# Patient Record
Sex: FEMALE | Race: WHITE | NOT HISPANIC OR LATINO | Employment: OTHER | URBAN - METROPOLITAN AREA
[De-identification: names, ages, dates, MRNs, and addresses within clinical notes are randomized per-mention and may not be internally consistent; named-entity substitution may affect disease eponyms.]

---

## 2018-04-02 ENCOUNTER — OFFICE VISIT CONVERTED (OUTPATIENT)
Dept: FAMILY MEDICINE CLINIC | Facility: CLINIC | Age: 78
End: 2018-04-02
Attending: NURSE PRACTITIONER

## 2018-10-10 ENCOUNTER — OFFICE VISIT CONVERTED (OUTPATIENT)
Dept: FAMILY MEDICINE CLINIC | Facility: CLINIC | Age: 78
End: 2018-10-10
Attending: NURSE PRACTITIONER

## 2018-10-10 ENCOUNTER — CONVERSION ENCOUNTER (OUTPATIENT)
Dept: FAMILY MEDICINE CLINIC | Facility: CLINIC | Age: 78
End: 2018-10-10

## 2019-03-14 ENCOUNTER — OFFICE VISIT CONVERTED (OUTPATIENT)
Dept: FAMILY MEDICINE CLINIC | Facility: CLINIC | Age: 79
End: 2019-03-14
Attending: NURSE PRACTITIONER

## 2019-03-14 ENCOUNTER — HOSPITAL ENCOUNTER (OUTPATIENT)
Dept: FAMILY MEDICINE CLINIC | Facility: CLINIC | Age: 79
Discharge: HOME | End: 2019-03-14
Attending: NURSE PRACTITIONER

## 2019-03-14 LAB
ALBUMIN SERPL-MCNC: 4.1 G/DL (ref 3.5–5)
ALBUMIN/GLOB SERPL: 1 {RATIO} (ref 1.4–2.6)
ALP SERPL-CCNC: 129 U/L (ref 43–160)
ALT SERPL-CCNC: 11 U/L (ref 10–40)
AMPHETAMINES UR QL SCN: NEGATIVE
ANION GAP SERPL CALC-SCNC: 19 MMOL/L (ref 8–19)
APPEARANCE UR: ABNORMAL
AST SERPL-CCNC: 21 U/L (ref 15–50)
BARBITURATES UR QL SCN: NEGATIVE
BASOPHILS # BLD AUTO: 0.06 10*3/UL (ref 0–0.2)
BASOPHILS NFR BLD AUTO: 0.6 % (ref 0–3)
BENZODIAZ UR QL SCN: NEGATIVE
BILIRUB SERPL-MCNC: 0.53 MG/DL (ref 0.2–1.3)
BILIRUB UR QL: NEGATIVE
BUN SERPL-MCNC: 29 MG/DL (ref 5–25)
BUN/CREAT SERPL: 20 {RATIO} (ref 6–20)
CALCIUM SERPL-MCNC: 9.9 MG/DL (ref 8.7–10.4)
CHLORIDE SERPL-SCNC: 103 MMOL/L (ref 99–111)
CHOLEST SERPL-MCNC: 129 MG/DL (ref 107–200)
CHOLEST/HDLC SERPL: 3.7 {RATIO} (ref 3–6)
COLOR UR: ABNORMAL
CONV ABS IMM GRAN: 0.04 10*3/UL (ref 0–0.2)
CONV BACTERIA: NEGATIVE
CONV CO2: 25 MMOL/L (ref 22–32)
CONV COCAINE, UR: NEGATIVE
CONV COLLECTION SOURCE (UA): ABNORMAL
CONV CRYSTALS: ABNORMAL /[HPF]
CONV HYALINE CASTS IN URINE MICRO: ABNORMAL /[LPF]
CONV IMMATURE GRAN: 0.4 % (ref 0–1.8)
CONV TOTAL PROTEIN: 8.4 G/DL (ref 6.3–8.2)
CONV UROBILINOGEN IN URINE BY AUTOMATED TEST STRIP: 1 {EHRLICHU}/DL (ref 0.1–1)
CREAT UR-MCNC: 1.45 MG/DL (ref 0.5–0.9)
DEPRECATED RDW RBC AUTO: 46.2 FL (ref 36.4–46.3)
EOSINOPHIL # BLD AUTO: 0.27 10*3/UL (ref 0–0.7)
EOSINOPHIL # BLD AUTO: 2.6 % (ref 0–7)
ERYTHROCYTE [DISTWIDTH] IN BLOOD BY AUTOMATED COUNT: 13.1 % (ref 11.7–14.4)
EST. AVERAGE GLUCOSE BLD GHB EST-MCNC: 114 MG/DL
FOLATE SERPL-MCNC: 17.8 NG/ML (ref 4.8–20)
GFR SERPLBLD BASED ON 1.73 SQ M-ARVRAT: 35 ML/MIN/{1.73_M2}
GLOBULIN UR ELPH-MCNC: 4.3 G/DL (ref 2–3.5)
GLUCOSE SERPL-MCNC: 114 MG/DL (ref 65–99)
GLUCOSE UR QL: NEGATIVE MG/DL
HBA1C MFR BLD: 13.9 G/DL (ref 12–16)
HBA1C MFR BLD: 5.6 % (ref 3.5–5.7)
HCT VFR BLD AUTO: 43.3 % (ref 37–47)
HDLC SERPL-MCNC: 35 MG/DL (ref 40–60)
HGB UR QL STRIP: NEGATIVE
KETONES UR QL STRIP: NEGATIVE MG/DL
LDLC SERPL CALC-MCNC: 67 MG/DL (ref 70–100)
LEUKOCYTE ESTERASE UR QL STRIP: ABNORMAL
LYMPHOCYTES # BLD AUTO: 2.09 10*3/UL (ref 1–5)
MCH RBC QN AUTO: 30.9 PG (ref 27–31)
MCHC RBC AUTO-ENTMCNC: 32.1 G/DL (ref 33–37)
MCV RBC AUTO: 96.2 FL (ref 81–99)
METHADONE UR QL SCN: NEGATIVE
MONOCYTES # BLD AUTO: 0.82 10*3/UL (ref 0.2–1.2)
MONOCYTES NFR BLD AUTO: 7.9 % (ref 3–10)
NEUTROPHILS # BLD AUTO: 7.11 10*3/UL (ref 2–8)
NEUTROPHILS NFR BLD AUTO: 68.4 % (ref 30–85)
NITRITE UR QL STRIP: NEGATIVE
NRBC CBCN: 0 % (ref 0–0.7)
OPIATES TESTED UR SCN: NEGATIVE
OSMOLALITY SERPL CALC.SUM OF ELEC: 305 MOSM/KG (ref 273–304)
OXYCODONE UR QL SCN: NEGATIVE
PCP UR QL: NEGATIVE
PH UR STRIP.AUTO: 5 [PH] (ref 5–8)
PLATELET # BLD AUTO: 272 10*3/UL (ref 130–400)
PMV BLD AUTO: 13 FL (ref 9.4–12.3)
POTASSIUM SERPL-SCNC: 3.2 MMOL/L (ref 3.5–5.3)
PROT UR QL: NEGATIVE MG/DL
RBC # BLD AUTO: 4.5 10*6/UL (ref 4.2–5.4)
RBC #/AREA URNS HPF: ABNORMAL /[HPF]
SODIUM SERPL-SCNC: 144 MMOL/L (ref 135–147)
SP GR UR: 1.02 (ref 1–1.03)
SQUAMOUS SPT QL MICRO: ABNORMAL /[HPF]
T4 FREE SERPL-MCNC: 1.3 NG/DL (ref 0.9–1.8)
THC SERPLBLD CFM-MCNC: NEGATIVE NG/ML
TRIGL SERPL-MCNC: 135 MG/DL (ref 40–150)
TSH SERPL-ACNC: 3.5 M[IU]/L (ref 0.27–4.2)
VARIANT LYMPHS NFR BLD MANUAL: 20.1 % (ref 20–45)
VIT B12 SERPL-MCNC: 172 PG/ML (ref 211–911)
VLDLC SERPL-MCNC: 27 MG/DL (ref 5–37)
WBC # BLD AUTO: 10.39 10*3/UL (ref 4.8–10.8)
WBC #/AREA URNS HPF: ABNORMAL /[HPF]

## 2019-03-27 ENCOUNTER — HOSPITAL ENCOUNTER (OUTPATIENT)
Dept: FAMILY MEDICINE CLINIC | Facility: CLINIC | Age: 79
Discharge: HOME | End: 2019-03-27
Attending: NURSE PRACTITIONER

## 2019-03-27 ENCOUNTER — OFFICE VISIT CONVERTED (OUTPATIENT)
Dept: FAMILY MEDICINE CLINIC | Facility: CLINIC | Age: 79
End: 2019-03-27
Attending: NURSE PRACTITIONER

## 2019-03-27 ENCOUNTER — CONVERSION ENCOUNTER (OUTPATIENT)
Dept: FAMILY MEDICINE CLINIC | Facility: CLINIC | Age: 79
End: 2019-03-27

## 2019-03-27 LAB
25(OH)D3 SERPL-MCNC: 11.2 NG/ML (ref 30–100)
ALBUMIN SERPL-MCNC: 4 G/DL (ref 3.5–5)
ALBUMIN/GLOB SERPL: 1.2 {RATIO} (ref 1.4–2.6)
ALP SERPL-CCNC: 134 U/L (ref 43–160)
ALT SERPL-CCNC: 13 U/L (ref 10–40)
ANION GAP SERPL CALC-SCNC: 17 MMOL/L (ref 8–19)
AST SERPL-CCNC: 22 U/L (ref 15–50)
BILIRUB SERPL-MCNC: 0.28 MG/DL (ref 0.2–1.3)
BUN SERPL-MCNC: 22 MG/DL (ref 5–25)
BUN/CREAT SERPL: 15 {RATIO} (ref 6–20)
CALCIUM SERPL-MCNC: 9.6 MG/DL (ref 8.7–10.4)
CHLORIDE SERPL-SCNC: 107 MMOL/L (ref 99–111)
CONV CO2: 22 MMOL/L (ref 22–32)
CONV TOTAL PROTEIN: 7.3 G/DL (ref 6.3–8.2)
CREAT UR-MCNC: 1.44 MG/DL (ref 0.5–0.9)
GFR SERPLBLD BASED ON 1.73 SQ M-ARVRAT: 35 ML/MIN/{1.73_M2}
GLOBULIN UR ELPH-MCNC: 3.3 G/DL (ref 2–3.5)
GLUCOSE SERPL-MCNC: 85 MG/DL (ref 65–99)
OSMOLALITY SERPL CALC.SUM OF ELEC: 297 MOSM/KG (ref 273–304)
POTASSIUM SERPL-SCNC: 4.3 MMOL/L (ref 3.5–5.3)
SODIUM SERPL-SCNC: 142 MMOL/L (ref 135–147)

## 2019-03-28 LAB
ALBUMIN SERPL-MCNC: 3.1 G/DL (ref 2.9–4.4)
ALBUMIN/GLOB SERPL: 0.8 {RATIO} (ref 0.7–1.7)
ALPHA2 GLOB SERPL ELPH-MCNC: 0.9 G/DL (ref 0.4–1)
BETA GLOBULIN: 1.2 G/DL (ref 0.7–1.3)
CONV ALPHA-1-GLOBULIN: 0.3 G/DL (ref 0–0.4)
CONV PE INTERPRETATION: NORMAL
CONV PE NOTE: NORMAL
CONV TOTAL PROTEIN: 6.9 G/DL (ref 6–8.5)
GAMMA GLOB SERPL ELPH-MCNC: 1.4 G/DL (ref 0.4–1.8)
GLOBULIN UR ELPH-MCNC: 3.8 G/DL (ref 2.2–3.9)
M-SPIKE: NORMAL G/DL

## 2019-03-29 LAB — BACTERIA UR CULT: NORMAL

## 2019-04-11 ENCOUNTER — HOSPITAL ENCOUNTER (OUTPATIENT)
Dept: OTHER | Facility: HOSPITAL | Age: 79
Discharge: HOME | End: 2019-04-11
Attending: NURSE PRACTITIONER

## 2019-05-21 ENCOUNTER — HOSPITAL ENCOUNTER (OUTPATIENT)
Dept: SURGERY | Facility: HOSPITAL | Age: 79
Setting detail: HOSPITAL OUTPATIENT SURGERY
Discharge: HOME | End: 2019-05-21
Attending: OPHTHALMOLOGY

## 2019-05-21 LAB — GLUCOSE BLD-MCNC: 104 MG/DL (ref 65–99)

## 2019-06-25 ENCOUNTER — HOSPITAL ENCOUNTER (OUTPATIENT)
Dept: SURGERY | Facility: HOSPITAL | Age: 79
Setting detail: HOSPITAL OUTPATIENT SURGERY
Discharge: HOME | End: 2019-06-25
Attending: OPHTHALMOLOGY

## 2019-06-25 LAB — GLUCOSE BLD-MCNC: 99 MG/DL (ref 65–99)

## 2019-08-29 ENCOUNTER — HOSPITAL ENCOUNTER (OUTPATIENT)
Dept: FAMILY MEDICINE CLINIC | Facility: CLINIC | Age: 79
Discharge: HOME | End: 2019-08-29
Attending: NURSE PRACTITIONER

## 2019-08-29 ENCOUNTER — OFFICE VISIT CONVERTED (OUTPATIENT)
Dept: FAMILY MEDICINE CLINIC | Facility: CLINIC | Age: 79
End: 2019-08-29
Attending: NURSE PRACTITIONER

## 2019-08-29 LAB
25(OH)D3 SERPL-MCNC: 35.7 NG/ML (ref 30–100)
ALBUMIN SERPL-MCNC: 4.3 G/DL (ref 3.5–5)
ALBUMIN/GLOB SERPL: 1.1 {RATIO} (ref 1.4–2.6)
ALP SERPL-CCNC: 81 U/L (ref 43–160)
ALT SERPL-CCNC: 20 U/L (ref 10–40)
ANION GAP SERPL CALC-SCNC: 21 MMOL/L (ref 8–19)
APPEARANCE UR: CLEAR
AST SERPL-CCNC: 26 U/L (ref 15–50)
BASOPHILS # BLD AUTO: 0.03 10*3/UL (ref 0–0.2)
BASOPHILS NFR BLD AUTO: 0.3 % (ref 0–3)
BILIRUB SERPL-MCNC: 0.59 MG/DL (ref 0.2–1.3)
BILIRUB UR QL: NEGATIVE
BUN SERPL-MCNC: 26 MG/DL (ref 5–25)
BUN/CREAT SERPL: 21 {RATIO} (ref 6–20)
CALCIUM SERPL-MCNC: 10.2 MG/DL (ref 8.7–10.4)
CHLORIDE SERPL-SCNC: 101 MMOL/L (ref 99–111)
CHOLEST SERPL-MCNC: 119 MG/DL (ref 107–200)
CHOLEST/HDLC SERPL: 3.4 {RATIO} (ref 3–6)
COLOR UR: ABNORMAL
CONV ABS IMM GRAN: 0.04 10*3/UL (ref 0–0.2)
CONV BACTERIA: NEGATIVE
CONV CO2: 24 MMOL/L (ref 22–32)
CONV COLLECTION SOURCE (UA): ABNORMAL
CONV HYALINE CASTS IN URINE MICRO: ABNORMAL /[LPF]
CONV IMMATURE GRAN: 0.4 % (ref 0–1.8)
CONV TOTAL PROTEIN: 8.3 G/DL (ref 6.3–8.2)
CONV UROBILINOGEN IN URINE BY AUTOMATED TEST STRIP: 1 {EHRLICHU}/DL (ref 0.1–1)
CREAT UR-MCNC: 1.23 MG/DL (ref 0.5–0.9)
DEPRECATED RDW RBC AUTO: 44.9 FL (ref 36.4–46.3)
EOSINOPHIL # BLD AUTO: 0.33 10*3/UL (ref 0–0.7)
EOSINOPHIL # BLD AUTO: 3.3 % (ref 0–7)
ERYTHROCYTE [DISTWIDTH] IN BLOOD BY AUTOMATED COUNT: 13 % (ref 11.7–14.4)
EST. AVERAGE GLUCOSE BLD GHB EST-MCNC: 111 MG/DL
FOLATE SERPL-MCNC: >20 NG/ML (ref 4.8–20)
GFR SERPLBLD BASED ON 1.73 SQ M-ARVRAT: 42 ML/MIN/{1.73_M2}
GLOBULIN UR ELPH-MCNC: 4 G/DL (ref 2–3.5)
GLUCOSE SERPL-MCNC: 110 MG/DL (ref 65–99)
GLUCOSE UR QL: NEGATIVE MG/DL
HBA1C MFR BLD: 5.5 % (ref 3.5–5.7)
HCT VFR BLD AUTO: 42.1 % (ref 37–47)
HDLC SERPL-MCNC: 35 MG/DL (ref 40–60)
HGB BLD-MCNC: 13.7 G/DL (ref 12–16)
HGB UR QL STRIP: NEGATIVE
KETONES UR QL STRIP: NEGATIVE MG/DL
LDLC SERPL CALC-MCNC: 58 MG/DL (ref 70–100)
LEUKOCYTE ESTERASE UR QL STRIP: ABNORMAL
LYMPHOCYTES # BLD AUTO: 1.99 10*3/UL (ref 1–5)
LYMPHOCYTES NFR BLD AUTO: 20.1 % (ref 20–45)
MCH RBC QN AUTO: 30.5 PG (ref 27–31)
MCHC RBC AUTO-ENTMCNC: 32.5 G/DL (ref 33–37)
MCV RBC AUTO: 93.8 FL (ref 81–99)
MONOCYTES # BLD AUTO: 0.88 10*3/UL (ref 0.2–1.2)
MONOCYTES NFR BLD AUTO: 8.9 % (ref 3–10)
NEUTROPHILS # BLD AUTO: 6.61 10*3/UL (ref 2–8)
NEUTROPHILS NFR BLD AUTO: 67 % (ref 30–85)
NITRITE UR QL STRIP: NEGATIVE
NRBC CBCN: 0 % (ref 0–0.7)
OSMOLALITY SERPL CALC.SUM OF ELEC: 299 MOSM/KG (ref 273–304)
PH UR STRIP.AUTO: 5.5 [PH] (ref 5–8)
PLATELET # BLD AUTO: 256 10*3/UL (ref 130–400)
PMV BLD AUTO: 13.3 FL (ref 9.4–12.3)
POTASSIUM SERPL-SCNC: 3.6 MMOL/L (ref 3.5–5.3)
PROT UR QL: NEGATIVE MG/DL
RBC # BLD AUTO: 4.49 10*6/UL (ref 4.2–5.4)
RBC #/AREA URNS HPF: ABNORMAL /[HPF]
SODIUM SERPL-SCNC: 142 MMOL/L (ref 135–147)
SP GR UR: 1.02 (ref 1–1.03)
SQUAMOUS SPT QL MICRO: ABNORMAL /[HPF]
T4 FREE SERPL-MCNC: 1.2 NG/DL (ref 0.9–1.8)
TRIGL SERPL-MCNC: 131 MG/DL (ref 40–150)
TSH SERPL-ACNC: 4.11 M[IU]/L (ref 0.27–4.2)
VIT B12 SERPL-MCNC: >2000 PG/ML (ref 211–911)
VLDLC SERPL-MCNC: 26 MG/DL (ref 5–37)
WBC # BLD AUTO: 9.88 10*3/UL (ref 4.8–10.8)
WBC #/AREA URNS HPF: ABNORMAL /[HPF]

## 2020-01-14 ENCOUNTER — HOSPITAL ENCOUNTER (OUTPATIENT)
Dept: FAMILY MEDICINE CLINIC | Facility: CLINIC | Age: 80
Discharge: HOME | End: 2020-01-14
Attending: NURSE PRACTITIONER

## 2020-01-14 ENCOUNTER — OFFICE VISIT CONVERTED (OUTPATIENT)
Dept: FAMILY MEDICINE CLINIC | Facility: CLINIC | Age: 80
End: 2020-01-14
Attending: NURSE PRACTITIONER

## 2020-01-14 LAB
ALBUMIN SERPL-MCNC: 4.4 G/DL (ref 3.5–5)
ALBUMIN/GLOB SERPL: 1.2 {RATIO} (ref 1.4–2.6)
ALP SERPL-CCNC: 66 U/L (ref 43–160)
ALT SERPL-CCNC: 23 U/L (ref 10–40)
AMPHETAMINES UR QL SCN: NEGATIVE
ANION GAP SERPL CALC-SCNC: 22 MMOL/L (ref 8–19)
AST SERPL-CCNC: 30 U/L (ref 15–50)
BARBITURATES UR QL SCN: NEGATIVE
BENZODIAZ UR QL SCN: NEGATIVE
BILIRUB SERPL-MCNC: 0.53 MG/DL (ref 0.2–1.3)
BUN SERPL-MCNC: 23 MG/DL (ref 5–25)
BUN/CREAT SERPL: 19 {RATIO} (ref 6–20)
CALCIUM SERPL-MCNC: 10.3 MG/DL (ref 8.7–10.4)
CHLORIDE SERPL-SCNC: 100 MMOL/L (ref 99–111)
CHOLEST SERPL-MCNC: 155 MG/DL (ref 107–200)
CHOLEST/HDLC SERPL: 4 {RATIO} (ref 3–6)
CONV CO2: 24 MMOL/L (ref 22–32)
CONV COCAINE, UR: NEGATIVE
CONV CREATININE URINE, RANDOM: 98.8 MG/DL (ref 10–300)
CONV MICROALBUM.,U,RANDOM: <12 MG/L (ref 0–20)
CONV TOTAL PROTEIN: 8.2 G/DL (ref 6.3–8.2)
CREAT UR-MCNC: 1.18 MG/DL (ref 0.5–0.9)
EST. AVERAGE GLUCOSE BLD GHB EST-MCNC: 114 MG/DL
GFR SERPLBLD BASED ON 1.73 SQ M-ARVRAT: 44 ML/MIN/{1.73_M2}
GLOBULIN UR ELPH-MCNC: 3.8 G/DL (ref 2–3.5)
GLUCOSE SERPL-MCNC: 103 MG/DL (ref 65–99)
HBA1C MFR BLD: 5.6 % (ref 3.5–5.7)
HDLC SERPL-MCNC: 39 MG/DL (ref 40–60)
LDLC SERPL CALC-MCNC: 84 MG/DL (ref 70–100)
METHADONE UR QL SCN: NEGATIVE
MICROALBUMIN/CREAT UR: 12.1 MG/G{CRE} (ref 0–35)
OPIATES TESTED UR SCN: NEGATIVE
OSMOLALITY SERPL CALC.SUM OF ELEC: 298 MOSM/KG (ref 273–304)
OXYCODONE UR QL SCN: NEGATIVE
PCP UR QL: NEGATIVE
POTASSIUM SERPL-SCNC: 3.8 MMOL/L (ref 3.5–5.3)
SODIUM SERPL-SCNC: 142 MMOL/L (ref 135–147)
T4 FREE SERPL-MCNC: 1 NG/DL (ref 0.9–1.8)
THC SERPLBLD CFM-MCNC: NEGATIVE NG/ML
TRIGL SERPL-MCNC: 161 MG/DL (ref 40–150)
TSH SERPL-ACNC: 5.12 M[IU]/L (ref 0.27–4.2)
VLDLC SERPL-MCNC: 32 MG/DL (ref 5–37)

## 2020-06-18 ENCOUNTER — OFFICE VISIT CONVERTED (OUTPATIENT)
Dept: FAMILY MEDICINE CLINIC | Facility: CLINIC | Age: 80
End: 2020-06-18
Attending: NURSE PRACTITIONER

## 2020-06-18 ENCOUNTER — CONVERSION ENCOUNTER (OUTPATIENT)
Dept: FAMILY MEDICINE CLINIC | Facility: CLINIC | Age: 80
End: 2020-06-18

## 2020-06-18 ENCOUNTER — HOSPITAL ENCOUNTER (OUTPATIENT)
Dept: FAMILY MEDICINE CLINIC | Facility: CLINIC | Age: 80
Discharge: HOME | End: 2020-06-18
Attending: NURSE PRACTITIONER

## 2020-06-18 LAB
ALBUMIN SERPL-MCNC: 4 G/DL (ref 3.5–5)
ALBUMIN/GLOB SERPL: 1.1 {RATIO} (ref 1.4–2.6)
ALP SERPL-CCNC: 68 U/L (ref 43–160)
ALT SERPL-CCNC: 16 U/L (ref 10–40)
ANION GAP SERPL CALC-SCNC: 17 MMOL/L (ref 8–19)
APPEARANCE UR: CLEAR
AST SERPL-CCNC: 27 U/L (ref 15–50)
BASOPHILS # BLD AUTO: 0.03 10*3/UL (ref 0–0.2)
BASOPHILS NFR BLD AUTO: 0.3 % (ref 0–3)
BILIRUB SERPL-MCNC: 0.53 MG/DL (ref 0.2–1.3)
BILIRUB UR QL: NEGATIVE
BUN SERPL-MCNC: 25 MG/DL (ref 5–25)
BUN/CREAT SERPL: 17 {RATIO} (ref 6–20)
CALCIUM SERPL-MCNC: 10.1 MG/DL (ref 8.7–10.4)
CHLORIDE SERPL-SCNC: 104 MMOL/L (ref 99–111)
CHOLEST SERPL-MCNC: 151 MG/DL (ref 107–200)
CHOLEST/HDLC SERPL: 4.1 {RATIO} (ref 3–6)
COLOR UR: YELLOW
CONV ABS IMM GRAN: 0.02 10*3/UL (ref 0–0.2)
CONV BACTERIA: NEGATIVE
CONV CO2: 26 MMOL/L (ref 22–32)
CONV COLLECTION SOURCE (UA): ABNORMAL
CONV IMMATURE GRAN: 0.2 % (ref 0–1.8)
CONV TOTAL PROTEIN: 7.5 G/DL (ref 6.3–8.2)
CONV UROBILINOGEN IN URINE BY AUTOMATED TEST STRIP: 1 {EHRLICHU}/DL (ref 0.1–1)
CREAT UR-MCNC: 1.47 MG/DL (ref 0.5–0.9)
DEPRECATED RDW RBC AUTO: 46.8 FL (ref 36.4–46.3)
EOSINOPHIL # BLD AUTO: 0.24 10*3/UL (ref 0–0.7)
EOSINOPHIL # BLD AUTO: 2.8 % (ref 0–7)
ERYTHROCYTE [DISTWIDTH] IN BLOOD BY AUTOMATED COUNT: 13.7 % (ref 11.7–14.4)
EST. AVERAGE GLUCOSE BLD GHB EST-MCNC: 131 MG/DL
FOLATE SERPL-MCNC: >20 NG/ML (ref 4.8–20)
GFR SERPLBLD BASED ON 1.73 SQ M-ARVRAT: 34 ML/MIN/{1.73_M2}
GLOBULIN UR ELPH-MCNC: 3.5 G/DL (ref 2–3.5)
GLUCOSE SERPL-MCNC: 105 MG/DL (ref 65–99)
GLUCOSE UR QL: NEGATIVE MG/DL
HBA1C MFR BLD: 6.2 % (ref 3.5–5.7)
HCT VFR BLD AUTO: 43.2 % (ref 37–47)
HDLC SERPL-MCNC: 37 MG/DL (ref 40–60)
HGB BLD-MCNC: 13.8 G/DL (ref 12–16)
HGB UR QL STRIP: NEGATIVE
KETONES UR QL STRIP: NEGATIVE MG/DL
LDLC SERPL CALC-MCNC: 85 MG/DL (ref 70–100)
LEUKOCYTE ESTERASE UR QL STRIP: ABNORMAL
LYMPHOCYTES # BLD AUTO: 2.26 10*3/UL (ref 1–5)
LYMPHOCYTES NFR BLD AUTO: 26.2 % (ref 20–45)
MCH RBC QN AUTO: 29.9 PG (ref 27–31)
MCHC RBC AUTO-ENTMCNC: 31.9 G/DL (ref 33–37)
MCV RBC AUTO: 93.5 FL (ref 81–99)
MONOCYTES # BLD AUTO: 0.77 10*3/UL (ref 0.2–1.2)
MONOCYTES NFR BLD AUTO: 8.9 % (ref 3–10)
NEUTROPHILS # BLD AUTO: 5.32 10*3/UL (ref 2–8)
NEUTROPHILS NFR BLD AUTO: 61.6 % (ref 30–85)
NITRITE UR QL STRIP: NEGATIVE
NRBC CBCN: 0 % (ref 0–0.7)
OSMOLALITY SERPL CALC.SUM OF ELEC: 301 MOSM/KG (ref 273–304)
PH UR STRIP.AUTO: 7 [PH] (ref 5–8)
PLATELET # BLD AUTO: 258 10*3/UL (ref 130–400)
PMV BLD AUTO: 13.2 FL (ref 9.4–12.3)
POTASSIUM SERPL-SCNC: 3.8 MMOL/L (ref 3.5–5.3)
PROT UR QL: NEGATIVE MG/DL
RBC # BLD AUTO: 4.62 10*6/UL (ref 4.2–5.4)
RBC #/AREA URNS HPF: ABNORMAL /[HPF]
SODIUM SERPL-SCNC: 143 MMOL/L (ref 135–147)
SP GR UR: 1.02 (ref 1–1.03)
SQUAMOUS SPT QL MICRO: ABNORMAL /[HPF]
T4 FREE SERPL-MCNC: 1.2 NG/DL (ref 0.9–1.8)
TRIGL SERPL-MCNC: 146 MG/DL (ref 40–150)
TSH SERPL-ACNC: 3.43 M[IU]/L (ref 0.27–4.2)
URATE SERPL-MCNC: 9.3 MG/DL (ref 2.5–7.5)
VIT B12 SERPL-MCNC: 990 PG/ML (ref 211–911)
VLDLC SERPL-MCNC: 29 MG/DL (ref 5–37)
WBC # BLD AUTO: 8.64 10*3/UL (ref 4.8–10.8)
WBC #/AREA URNS HPF: ABNORMAL /[HPF]

## 2020-09-08 ENCOUNTER — CONVERSION ENCOUNTER (OUTPATIENT)
Dept: FAMILY MEDICINE CLINIC | Facility: CLINIC | Age: 80
End: 2020-09-08

## 2020-09-08 ENCOUNTER — OFFICE VISIT CONVERTED (OUTPATIENT)
Dept: FAMILY MEDICINE CLINIC | Facility: CLINIC | Age: 80
End: 2020-09-08
Attending: NURSE PRACTITIONER

## 2020-09-11 ENCOUNTER — HOSPITAL ENCOUNTER (OUTPATIENT)
Dept: OTHER | Facility: HOSPITAL | Age: 80
Discharge: HOME | End: 2020-09-11
Attending: NURSE PRACTITIONER

## 2021-02-15 ENCOUNTER — TELEMEDICINE CONVERTED (OUTPATIENT)
Dept: FAMILY MEDICINE CLINIC | Facility: CLINIC | Age: 81
End: 2021-02-15
Attending: NURSE PRACTITIONER

## 2021-02-22 ENCOUNTER — HOSPITAL ENCOUNTER (OUTPATIENT)
Dept: FAMILY MEDICINE CLINIC | Facility: CLINIC | Age: 81
Discharge: HOME | End: 2021-02-22
Attending: NURSE PRACTITIONER

## 2021-02-22 LAB
25(OH)D3 SERPL-MCNC: 44.9 NG/ML (ref 30–100)
ALBUMIN SERPL-MCNC: 3.9 G/DL (ref 3.5–5)
ALBUMIN/GLOB SERPL: 1.1 {RATIO} (ref 1.4–2.6)
ALP SERPL-CCNC: 93 U/L (ref 43–160)
ALT SERPL-CCNC: 23 U/L (ref 10–40)
ANION GAP SERPL CALC-SCNC: 16 MMOL/L (ref 8–19)
AST SERPL-CCNC: 30 U/L (ref 15–50)
BASOPHILS # BLD AUTO: 0.05 10*3/UL (ref 0–0.2)
BASOPHILS NFR BLD AUTO: 0.6 % (ref 0–3)
BILIRUB SERPL-MCNC: 0.57 MG/DL (ref 0.2–1.3)
BUN SERPL-MCNC: 28 MG/DL (ref 5–25)
BUN/CREAT SERPL: 19 {RATIO} (ref 6–20)
CALCIUM SERPL-MCNC: 9.9 MG/DL (ref 8.7–10.4)
CHLORIDE SERPL-SCNC: 108 MMOL/L (ref 99–111)
CHOLEST SERPL-MCNC: 131 MG/DL (ref 107–200)
CHOLEST/HDLC SERPL: 3.3 {RATIO} (ref 3–6)
CONV ABS IMM GRAN: 0.02 10*3/UL (ref 0–0.2)
CONV CO2: 24 MMOL/L (ref 22–32)
CONV CREATININE URINE, RANDOM: 150.2 MG/DL (ref 10–300)
CONV IMMATURE GRAN: 0.3 % (ref 0–1.8)
CONV MICROALBUM.,U,RANDOM: <12 MG/L (ref 0–20)
CONV TOTAL PROTEIN: 7.3 G/DL (ref 6.3–8.2)
CREAT UR-MCNC: 1.44 MG/DL (ref 0.5–0.9)
DEPRECATED RDW RBC AUTO: 50.2 FL (ref 36.4–46.3)
EOSINOPHIL # BLD AUTO: 0.33 10*3/UL (ref 0–0.7)
EOSINOPHIL # BLD AUTO: 4.2 % (ref 0–7)
ERYTHROCYTE [DISTWIDTH] IN BLOOD BY AUTOMATED COUNT: 14.2 % (ref 11.7–14.4)
EST. AVERAGE GLUCOSE BLD GHB EST-MCNC: 123 MG/DL
GFR SERPLBLD BASED ON 1.73 SQ M-ARVRAT: 35 ML/MIN/{1.73_M2}
GLOBULIN UR ELPH-MCNC: 3.4 G/DL (ref 2–3.5)
GLUCOSE SERPL-MCNC: 118 MG/DL (ref 65–99)
HBA1C MFR BLD: 5.9 % (ref 3.5–5.7)
HCT VFR BLD AUTO: 42.1 % (ref 37–47)
HDLC SERPL-MCNC: 40 MG/DL (ref 40–60)
HGB BLD-MCNC: 13.3 G/DL (ref 12–16)
LDLC SERPL CALC-MCNC: 62 MG/DL (ref 70–100)
LYMPHOCYTES # BLD AUTO: 2.06 10*3/UL (ref 1–5)
LYMPHOCYTES NFR BLD AUTO: 26.1 % (ref 20–45)
MCH RBC QN AUTO: 30.9 PG (ref 27–31)
MCHC RBC AUTO-ENTMCNC: 31.6 G/DL (ref 33–37)
MCV RBC AUTO: 97.9 FL (ref 81–99)
MICROALBUMIN/CREAT UR: 8 MG/G{CRE} (ref 0–35)
MONOCYTES # BLD AUTO: 0.73 10*3/UL (ref 0.2–1.2)
MONOCYTES NFR BLD AUTO: 9.3 % (ref 3–10)
NEUTROPHILS # BLD AUTO: 4.69 10*3/UL (ref 2–8)
NEUTROPHILS NFR BLD AUTO: 59.5 % (ref 30–85)
NRBC CBCN: 0 % (ref 0–0.7)
OSMOLALITY SERPL CALC.SUM OF ELEC: 305 MOSM/KG (ref 273–304)
PLATELET # BLD AUTO: 246 10*3/UL (ref 130–400)
PMV BLD AUTO: 13.1 FL (ref 9.4–12.3)
POTASSIUM SERPL-SCNC: 3.9 MMOL/L (ref 3.5–5.3)
RBC # BLD AUTO: 4.3 10*6/UL (ref 4.2–5.4)
SODIUM SERPL-SCNC: 144 MMOL/L (ref 135–147)
TRIGL SERPL-MCNC: 144 MG/DL (ref 40–150)
TSH SERPL-ACNC: 5.25 M[IU]/L (ref 0.27–4.2)
VLDLC SERPL-MCNC: 29 MG/DL (ref 5–37)
WBC # BLD AUTO: 7.88 10*3/UL (ref 4.8–10.8)

## 2021-05-07 ENCOUNTER — HOSPITAL ENCOUNTER (OUTPATIENT)
Dept: FAMILY MEDICINE CLINIC | Facility: CLINIC | Age: 81
Discharge: HOME | End: 2021-05-07
Attending: NURSE PRACTITIONER

## 2021-05-07 LAB — TSH SERPL-ACNC: 2.52 M[IU]/L (ref 0.27–4.2)

## 2021-05-07 NOTE — PROGRESS NOTES
Progress Note      Patient Name: Nicole Holliday   Patient ID: [id number]   Sex: Female   YOB: 1940        Visit Date: January 14, 2020    Provider: JHON Booth   Location: [facility]   Location Address: [address] [place] [address] [address] [place]  [address]   Location Phone: [phone]          Chief Complaint     PATIENT IS HERE FOR MEDICATION REFILLS.      BONE DENSITY ABOUT A YEAR A GO AT [facility].  EYE EXAM AT [facility] [place] ABOUT 4 MONTHS A GO.  NO FALLS IN THE LAST 6 MONTHS.  SHE HAS HAD THE FLU  SHOT HERE IN OCT.  SHE HAS HAD THE PNEUMONIA SHOT HERE ALSO       History Of Present Illness  Nicole Holliday is a 79 year old /White female who presents for evaluation and treatment of:      pt here for refills and labs--    pt sees DR Luis tomorrow in his [facility] office--FAX LABS IF POSSIBLE     pt reports needs everything to go to the mail order--expect tramadol--at [facility]    running out of the arthritis gel -    DM--stable--diet controlled only-stays 88-92 glucoses   HTN-stable  cholesterol-stable  GERD-stable    neuropathy stable on the lyrica--and pt ran out and daughter is a APRN and sent in a 1 month for her-feet were shooting pains like sharp knife like pains off the lyirca     OA--stable--on the voltaren gel and tramadol--    osteoporosis--stable no falls no fractures --tolerated meds well       Past Medical History  Disease Name Date Onset Notes   GERD (gastroesophageal reflux disease) --  --    Hyperlipidemia --  --    Hypertension, Benign Essential --  --    Hyperthyroidism --  --          Past Surgical History  Procedure Name Date Notes   Cholecstectomy --  --    Hysterectomy (due to cancer) --  --          Medication List  Name Date Started Instructions   Aspir-81 81 mg oral tablet,delayed release (/EC)  take 1 tablet (81 mg) by oral route once daily   Boniva 150 mg oral tablet 04/22/2019 take 1 tablet by PO once a month (same date) with a full  "glass of water and remain in upright position at least 1 hour.   clopidogrel 75 mg oral tablet 08/29/2019 take 1 tablet (75 mg) by oral route once daily for 90 days   cyanocobalamin (vitamin B-12) 1,000 mcg/mL injection solution 08/29/2019 inject 1 milliliter IM once monthly for 90 days   FreeStyle Lite Strips miscellaneous strip 03/14/2019 use as directed for 90 days check the glucose daily   FreeStyle System Kit miscellaneous kit 10/10/2018 check blood glucose once daily   Lancets,Ultra Thin miscellaneous misc 03/14/2019 use as directed for 90 days check glucse daily   levothyroxine 50 mcg oral tablet 08/29/2019 take 1 tablet (50 mcg) by oral route once daily for 90 days   Lyrica 75 mg oral capsule 08/29/2019 take 1 capsule by oral route once a day (at bedtime) for 90 days   metoprolol succinate 50 mg oral tablet extended release 24 hr  take 1 tablet by oral route 2 times a day   nitroglycerin 0.4 mg sublingual tablet, sublingual  place 1 tablet (0.4 mg) by buccal route at the first sign of an attack; no more than 3 tabs are recommended within a 15 minute period.   pantoprazole 40 mg oral tablet,delayed release (DR/EC) 08/29/2019 take 1 tablet (40 mg) by oral route once daily for 90 days   pravastatin 20 mg oral tablet 08/29/2019 take 1 tablet (20 mg) by oral route once daily at bedtime for 90 days   Syringe 3cc/22Gx3/4\" 3 mL 22 gauge x 3/4\" miscellaneous syringe 08/29/2019 use as directed once monthly IM injection for 90 days   tramadol 50 mg oral tablet 08/29/2019 take 1 tablet by oral route 2 times a day for 30 days   triamcinolone acetonide 0.1 % topical cream 08/29/2019 apply to affected area(s) by topical route 3 times a day as needed for 10 days itching   valsartan-hydrochlorothiazide 160-25 mg oral tablet 08/29/2019 take 1 tablet by oral route once daily for 90 days   Vitamin D2 50,000 unit oral capsule 03/28/2019 take 1 capsule (50,000 unit) by oral route once weekly for 90 days   Voltaren 1 % topical gel " 08/29/2019 apply 2 gram to the affected area(s) by topical route 4 times per day for 90 days         Allergy List  Allergen Name Date Reaction Notes   Contrast media allergy --  --  --    PENICILLINS --  --  --          Family Medical History  Disease Name Relative/Age Notes   Family History of Colon Cancer Brother/  Father/   Father; Brother   Arthrtis Father/   Father         Social History  Finding Status Start/Stop Quantity Notes   Alcohol Current - status unknown --/-- --  15 or more drinks per week   Second hand smoke exposure Unknown --/-- --  no   Tobacco Former --/68 0.5 PPD former smoker, smokes less than 1 pack per day, for less than 5 years, never uses other tobacco products   Uses seatbelts --  --/-- --  yes         Immunizations  NameDate Admin Mfg Trade Name Lot Number Route Inj VIS Given VIS Publication   HepA10/10/2018 [employer] HAVRIX-ADULT [id number] IM LD 10/10/2018 07/20/2016   Comments: NDC 23218-729-30   Gsapatyat95/30/2019 [facility] FLUZONE-HIGH DOSE [id number] IM LA 09/30/2019    Comments: NDC 35347-244-67   Cxfaznerf18/10/2018 [facility] FLUZONE-HIGH DOSE [id number] IM LD 10/10/2018 08/07/2015   Comments: NDC 73514-438-14         Review of Systems  · Constitutional  o Denies  o : fatigue, fever  · HENT  o Denies  o : vertigo, recent head injury, nasal congestion, nasal discharge  · Cardiovascular  o Denies  o : chest pain, irregular heart beats  · Respiratory  o Denies  o : shortness of breath, productive cough  · Gastrointestinal  o Admits  o : reflux  o Denies  o : nausea, vomiting, abdominal pain  · Genitourinary  o Denies  o : dysuria  · Integument  o Denies  o : rash, hair growth change, new skin lesions  · Neurologic  o Admits  o : tingling or numbness  o Denies  o : altered mental status, seizures, tremors  · Musculoskeletal  o Admits  o : back pain, shoulder pain, hip pain, knee pain, additional musculoskeletal symptoms except as noted in the HPI  o Denies  o : joint swelling, limitation of  "motion  · Endocrine  o Denies  o : cold intolerance, heat intolerance  · Psychiatric  o Denies  o : suicidal ideation, homicidal ideation  · Heme-Lymph  o Denies  o : petechiae, lymph node enlargement or tenderness  · Allergic-Immunologic  o Denies  o : frequent illnesses      Vitals  Date Time BP Position Site L\R Cuff Size HR RR TEMP (F) WT  HT  BMI kg/m2 BSA m2 O2 Sat        01/14/2020 09:42 /72 Sitting    78 - R  98 169lbs 9oz 5'  1.5\" 31.52 1.83 98 %          Physical Examination  · Constitutional  o Appearance  o : well developed, well-nourished, in no acute distress  · Head and Face  o HEENT  o : Unremarkable  · Eyes  o Conjunctivae  o : conjunctivae normal  · Neck  o Inspection/Palpation  o : supple  o Thyroid  o : no thyromegaly  · Respiratory  o Respiratory Effort  o : breathing unlabored  o Auscultation of Lungs  o : clear to ascultation  · Cardiovascular  o Heart  o :   § Auscultation of Heart  § : regular rate and rhythm  o Peripheral Vascular System  o :   § Extremities  § : no edema  · Gastrointestinal  o Abdominal Examination  o :   § Abdomen  § : soft nontender  · Lymphatic  o Neck  o : no lymphadenopathy present  · Musculoskeletal  o General  o :   § General Musculoskeletal  § : pt moves stiffly and has OA of the hips and knees and shoulders and hands and lower back--moves stiffly and uses the cane to walk  · Skin and Subcutaneous Tissue  o General Inspection  o : skin turgor normal, texture normal  · Neurologic  o Gait and Station  o :   § Gait Screening  § : normal gait--for pt and hobbles somewhat with walking and then the pt uses the cane to walk   · Psychiatric  o Mood and Affect  o : mood normal, affect appropriate  · Left DM Foot Exam  o Sensation  o : abnormal sensory exam perceptible to 10-gram nylon monofilament exam and light touch. to the entire bottom of the foot except to the base of great toe and laterall aspect of bottom of foot   o Visual Inspection  o : visual inspection " is normal with no signs of breakdown, ulcerations or deformities unless otherwise noted. callus ot the base of great toe  o Vascular  o : palpable dorsalis pedis and posterir tibialis pulses present, normal capillary refill  · Right DM Foot Exam  o Sensation  o : abnormal sensory exam perceptible to 10-gram nylon monofilament exam touch. to the whole bottom of the foot-except the base of great toe and the toe itself   o Visual Inspection  o : visual inspection is normal with no signs of breakdown, ulcerations or deformities unless otherwise noted. (+) bunion and callus formation to the base and side of great toe  o Vascular  o : palpable dorsalis pedis and posterir tibialis pulses present, normal capillary refill              Assessment  · Diabetes mellitus, type 2     250.00/E11.9  · Essential hypertension     401.9/I10  · GERD (gastroesophageal reflux disease)     530.81/K21.9  · Hyperlipidemia     272.4/E78.5  · Hypothyroidism     244.9/E03.9  · Osteoarthritis     715.90/M19.90  · Osteoporosis     733.00/M81.0  · Polyarthralgia     719.49/M25.50  · Diet-controlled diabetes mellitus     250.00/E11.9  · Neuropathy     355.9/G62.9  · DJD (degenerative joint disease)     715.90/M19.90  · CKD (chronic kidney disease) stage 3, GFR 30-59 ml/min     585.3/N18.3  · High risk medication use     V58.69/Z79.899      Plan  · Orders  o Diabetes 2 Panel (CMP, Lipid, A1c, Urine Microalbumin) [facility] (76741, 23508, 54376, 46024) - 250.00/E11.9 - 01/14/2020  o Diabetic Foot (Motor and Sensory) Exam Completed [facility] (, , 2028F) - 250.00/E11.9 - 01/14/2020  o Thyroid Profile (THYII) - 244.9/E03.9 - 01/14/2020  o DEXA Bone Density, 1 or more sites, axial skeleton [facility] (40387) - 733.00/M81.0 - 01/14/2020   done last year see imaging  o Urine Drug Screen ([facility]) (59610) - 355.9/G62.9, 715.90/M19.90, 719.49/M25.50, V58.69/Z79.899 - 01/14/2020  o ACO-13: Fall Risk Screening with no falls in past year or only one fall without injury in  the past year (1101F) - - 01/14/2020  o ACO-14: Influenza immunization administered or previously received () - - 01/14/2020  o ACO-15: Pneumococcal Vaccine Administered or Previously Received (4040F) - - 01/14/2020  o ACO-27: Most recent 2019 HgbA1c less than 7 [facility] (3044F) - - 01/14/2020  o ACO-39: Current medications updated and reviewed () - - 01/14/2020  · Medications  o nitroglycerin 0.4 mg sublingual tablet, sublingual   SIG: place 1 tab by buccal route at the first sign of an attack no more than 3 tabs are recommended within a 15 minute period   DISP: (1) Bottle with 1 refills  Prescribed on 01/14/2020     o Boniva 150 mg oral tablet   SIG: take 1 tablet by PO once a month (same date) with a full glass of water and remain in upright position at least 1 hour.   DISP: (3) tablets with 3 refills  Adjusted on 01/14/2020     o clopidogrel 75 mg oral tablet   SIG: take 1 tablet (75 mg) by oral route once daily for 90 days   DISP: (90) tablet with 1 refills  Adjusted on 01/14/2020     o cyanocobalamin (vitamin B-12) 1,000 mcg/mL injection solution   SIG: inject 1 milliliter IM once monthly for 90 days   DISP: (1) 30 ml vial with 0 refills  Adjusted on 01/14/2020     o FreeStyle Lite Strips miscellaneous strip   SIG: use as directed for 90 days check the glucose daily   DISP: (1) 100 ct box with 3 refills  Adjusted on 01/14/2020     o levothyroxine 50 mcg oral tablet   SIG: take 1 tablet (50 mcg) by oral route once daily for 90 days   DISP: (90) tablet with 1 refills  Adjusted on 01/14/2020     o Lyrica 75 mg oral capsule   SIG: take 1 capsule by oral route once a day (at bedtime) for 90 days   DISP: (90) capsule with 0 refills  Adjusted on 01/14/2020     o pantoprazole 40 mg oral tablet,delayed release (DR/EC)   SIG: take 1 tablet (40 mg) by oral route once daily for 90 days   DISP: (90) tablet with 1 refills  Adjusted on 01/14/2020     o pravastatin 20 mg oral tablet   SIG: take 1 tablet (20 mg) by oral  "route once daily at bedtime for 90 days   DISP: (90) tablets with 1 refills  Adjusted on 01/14/2020     o Syringe 3cc/22Gx3/4\" 3 mL 22 gauge x 3/4\" miscellaneous syringe   SIG: use as directed once monthly IM injection for 90 days   DISP: (1) 100 ct box with 0 refills  Adjusted on 01/14/2020     o tramadol 50 mg oral tablet   SIG: take 1 tablet by oral route 2 times a day for 30 days   DISP: (60) tablet with 2 refills  Adjusted on 01/14/2020     o valsartan-hydrochlorothiazide 160-25 mg oral tablet   SIG: take 1 tablet by oral route once daily for 90 days   DISP: (90) tablets with 1 refills  Adjusted on 01/14/2020     o Voltaren 1 % topical gel   SIG: apply to affected area(s) by topical route 4 times a day for 90 days   DISP: (3) 100 gm tube with 1 refills  Adjusted on 01/14/2020     o Medications have been Reconciled  o Transition of Care or Provider Policy  · Instructions  o Continue blood sugar monitoring daily and record. Bring your log to office visits. Call the office for readings below 70 and above 250 or any complications.  o Daily foot care. Avoid walking barefoot. Annual Dilated Eye Exam.  o Discussed with patient blood pressure monitoring, hemoglobin A1C levels need to be below 7.0, and LDL (Lipid) goals below 70.  o Patient advised to monitor blood pressure (B/P) at home and journal readings. Patient informed that a B/P reading at home of more than 135/85 is considered hypertension. For readings greater pnif554/90 or higher patient is advised to follow up in the office with readings for management. Patient advised to limit sodium intake.  o Maintain a healthy weight. Avoid tight fitting clothes. Avoid fried, fatty foods, tomato sauce, chocolate, mint, garlic, onion, alcohol. caffeine. Eat smaller meals, dont lie down after a meal, dont smoke. Elevate the head of your bed 6-9 inches.  o Recommended exercise program to assist with cholesterol, weight loss and overall health improvement.  o Advised that " cheeses and other sources of dairy fats, animal fats, fast food, and the extras (candy, pasteries, pies, doughnuts and cookies) all contain LDL raising nutrients. Advised to increase fruits, vegetables, whole grains, and to monitor portion sizes.   o Tylenol may be used as needed every 4-6 hours for pain.  o Stop taking calcium supplements for at least 48 hours prior to your exam. Failure to stop supplements could alter results, and the radiologists will require you to reschedule your test.  o Obtained a written consent for ANASTASIYA query. Discussed the risk and benefits of the use of controlled substances with the patient, including the risk of tolerance and drug dependence. The patient has been counseled on the need to have an exit strategy, including potentially discontinuing the use of controlled substances. ANASTASIYA has or will be reviewed as soon as it becomes avaliable.  o Take all medications as prescribed/directed.  o Patient was educated/instructed on their diagnosis, treatment and medications prior to discharge from the clinic today.  o Patient instructed to seek medical attention urgently for new or worsening symptoms.  o Call the office with any concerns or questions.  o Patient given paper scripts today.  o Risks, benefits, and alternatives were discussed with the patient. The patient is aware of risks associated with: CS use  o Chronic conditions reviewed and taken into consideration for today's treatment plan.  · Disposition  o Call or Return if symptoms worsen or persist.  o Return Visit Request in/on 3 months +/- 2 days (8264).            Electronically Signed by: JHON Booth -Author on January 14, 2020 10:55:18 AM

## 2021-05-07 NOTE — PROGRESS NOTES
Progress Note      Patient Name: Nicole Holliday   Patient ID: [id number]   Sex: Female   YOB: 1940        Visit Date: April 2, 2018    Provider: JHON Multani   Location: [facility]   Location Address: [address] [place] [address]  [id number]   Location Phone: [phone]          Chief Complaint     refills and labs, patient is fasting       History Of Present Illness  Nicole Holliday is a 77 year old /White female who presents for evaluation and treatment of:      Chronic health conditions and to refill medications.    She states that she has been doing well. Feels good. Has no complaints.    She reports compliance with her cholesterol medication. No muscle aches or weakness    She does not check her blood pressure at home. She had an episode a while back that was kind of scary. She has a pacemaker/defibrillator implant. She had a period of 3 hours for her heart rate was more than 300 and her blood pressure dropped. Her daughter encouraged her to go to the emergency room. She did not go. After 3 hours it stabilized. She did call the cardiologist. She sees Dr. Luis. She has a follow-up with him on April 26. Has not repeated.    She thinks that her thyroid condition is stable. However she wants to check it that she knows that it can messed up the heart rate.    She takes Lyrica for neuropathy. She was diabetic at some point in time in her life. The neuropathy started at that time. Now she still has some numbness and tingling that her diabetes is very well controlled. She has not taken medications for had an elevated blood glucose in many years.    She reports that her reflux is stable with the use of medication.    She has arthritis in her hip and legs. She saw the orthopedist. She is not a candidate for replacementher arthritis is too severe he told her it would just create other problems to replace her hips. She does well with tramadol. She uses  twice a day. It allows her to stay active. She walks with a cane.      She lost her  in October. Very peaceful passing. She did get a small dog to keep her company.                          Past Surgical History  Procedure Name Date Notes   Cholecstectomy --  --    Hysterectomy (due to cancer) --  --          Medication List  Name Date Started Instructions   Aspir-81 81 mg oral tablet,delayed release (DR/EC)  take 1 tablet (81 mg) by oral route once daily   atorvastatin 20 mg oral tablet  take 1 tablet (20 mg) by oral route once daily   clopidogrel 75 mg oral tablet  take 1 tablet (75 mg) by oral route once daily   levothyroxine 50 mcg oral tablet  take 1 tablet (50 mcg) by oral route once daily   Lyrica 75 mg oral capsule  take 1 capsule by oral route once a day (at bedtime)   metoprolol succinate 25 mg oral tablet extended release 24 hr  take 1 tablet by oral route   metoprolol succinate 50 mg oral tablet extended release 24 hr  take 1 tablet by oral route   nitroglycerin 0.4 mg sublingual tablet, sublingual  place 1 tablet (0.4 mg) by buccal route at the first sign of an attack; no more than 3 tabs are recommended within a 15 minute period.   pantoprazole 40 mg oral tablet,delayed release (DR/EC)  take 1 tablet (40 mg) by oral route once daily   tramadol 50 mg oral tablet  take 1 tablet by oral route   valsartan-hydrochlorothiazide 160-25 mg oral tablet  take 1 tablet by oral route once daily         Allergy List  Allergen Name Date Reaction Notes   Contrast media allergy --  --  --    PENICILLINS --  --  --          Family Medical History  Disease Name Relative/Age Notes   Arthrtis / Father    Father/    Family History of Colon Cancer / Father; Brother    Brother/     Father/          Social History  Finding Status Start/Stop Quantity Notes   Alcohol Current - status unknown --/-- --  15 or more drinks per week   Second hand smoke exposure Unknown --/-- --  no   Tobacco Former --/68 0.5 PPD former smoker,  smokes less than 1 pack per day, for less than 5 years, never uses other tobacco products   Uses seatbelts --  --/-- --  yes         Review of Systems  · Constitutional  o Denies  o : fatigue, fever  · Eyes  o Denies  o : discharge from eye, blurred vision  · HENT  o Denies  o : vertigo, nasal congestion  · Cardiovascular  o * See HPI  · Respiratory  o Denies  o : shortness of breath, cough  · Gastrointestinal  o Denies  o : nausea or vomiting  · Integument  o Denies  o : rash  · Neurologic  o Denies  o : memory difficulties, loss of balance  · Musculoskeletal  o Denies  o : muscle cramps  · Psychiatric  o Denies  o : anxiety, depression      Vitals  Date Time BP Position Site L\R Cuff Size HR RR TEMP(F) WT  HT  BMI kg/m2 BSA m2 O2 Sat HC       04/02/2018 02:21 /70 Sitting    82 - R  97.5 161lbs 4oz    100 %           Physical Examination  · Constitutional  o Appearance  o : well developed, well-nourished, in no acute distress  · Head and Face  o HEENT  o : Unremarkable  · Eyes  o Conjunctivae  o : conjunctivae normal  o Pupils and Irises  o : pupils equal and round  · Ears, Nose, Mouth and Throat  o Ears  o :   § External Ears  § : appearance within normal limits, no lesions present  o Nose  o :   § External Nose  § : appearance normal  o Oral Cavity  o :   § Oral Mucosa  § : oral mucosa normal  § Lips  § : lip appearance normal  · Neck  o Inspection/Palpation  o : supple  o Thyroid  o : no thyromegaly  · Respiratory  o Respiratory Effort  o : breathing unlabored  o Auscultation of Lungs  o : clear to ascultation  · Cardiovascular  o Heart  o :   § Auscultation of Heart  § : regular rate and rhythm  o Peripheral Vascular System  o :   § Extremities  § : no edema  · Gastrointestinal  o Abdominal Examination  o :   § Abdomen  § : soft, non-tender, non-distended, + bowel sounds, no hepatosplenomegaly, no masses palpated   · Lymphatic  o Neck  o : no lymphadenopathy present  · Musculoskeletal  o General  o :    § General Musculoskeletal  § : Muscle tone, and development grossly normal.  o Extremeties/Joint  o : Gait is stable with cane. Hands have swollen joints and nodules.  · Skin and Subcutaneous Tissue  o General Inspection  o : warm and dry, not obvious abnormal lesions  · Psychiatric  o Mood and Affect  o : mood normal, affect appropriate          Assessment  · Essential hypertension     401.9/I10  · GERD (gastroesophageal reflux disease)     530.81/K21.9  · Hyperlipidemia     272.4/E78.5  · Hypothyroidism     244.9/E03.9  · Arthritis     716.90/M19.90  · Stented coronary artery     V45.82/Z95.5  · RLS (restless legs syndrome)     333.94/G25.81  · Screen for colon cancer     V76.51/Z12.11  · Medication management     V58.69/Z79.899  · Tachycardia     785.0/R00.0  · Peripheral neuropathy     356.9/G62.9      Plan  · Orders  o CMP [facility] ([id number]) - 272.4/E78.5 - 04/02/2018  o Lipid Panel [facility] (34966) - 272.4/E78.5 - 04/02/2018  o TSH [facility] (23963) - 244.9/E03.9 - 04/02/2018  o CBC with Auto Diff [facility] (81869) - 401.9/I10 - 04/02/2018  o Urinalysis with Reflex Microscopy if abnormal ([facility]) (32676) - V58.69/Z79.899 - 04/02/2018  o Urine Drug Screen ([facility]) (28058) - V58.69/Z79.899 - 04/02/2018  o ACO-39: Current medications updated and reviewed () - - 04/02/2018  o ACO-18: Negative screen for clinical depression using a standardized tool () - - 04/02/2018   phq9=0  o ACO-15: Pneumococcal Vaccine Administered or Previously Received (4040F) - - 04/02/2018   had when 66 y/o  o Influenza immunization was not ordered or administered for reasons documented by clinician () - - 04/02/2018   already had for this season  o ACO-13: Fall Risk Screening with no falls in past year or only one fall without injury in the past year (1101F) - - 04/02/2018  o [facility] (98014) - V76.51/Z12.11 - 04/02/2018  · Medications  o atorvastatin 20 mg oral tablet   SIG: take 1 tablet (20 mg) by oral route once daily   DISP: (90) tablet with 1  refills  Adjusted on 04/02/2018     o clopidogrel 75 mg oral tablet   SIG: take 1 tablet (75 mg) by oral route once daily   DISP: (90) tablet with 1 refills  Adjusted on 04/02/2018     o levothyroxine 50 mcg oral tablet   SIG: take 1 tablet (50 mcg) by oral route once daily for 90 days   DISP: (90) tablet with 1 refills  Adjusted on 04/02/2018     o Lyrica 75 mg oral capsule   SIG: take 1 capsule by oral route once a day (at bedtime) for 90 days   DISP: (90) capsule with 0 refills  Adjusted on 04/02/2018     o metoprolol succinate 25 mg oral tablet extended release 24 hr   SIG: take 1 tablet by oral route QD in hs   DISP: (90) tablet with 1 refills  Adjusted on 04/02/2018     o metoprolol succinate 50 mg oral tablet extended release 24 hr   SIG: take 1 tablet by oral route QD in a.m.   DISP: (90) tablet with 1 refills  Adjusted on 04/02/2018     o pantoprazole 40 mg oral tablet,delayed release (DR/EC)   SIG: take 1 tablet (40 mg) by oral route once daily for 90 days   DISP: (90) tablet with 1 refills  Adjusted on 04/02/2018     o tramadol 50 mg oral tablet   SIG: take 1 tablet by oral route 2 times a day for 30 days   DISP: (60) tablet with 2 refills  Adjusted on 04/02/2018     o valsartan-hydrochlorothiazide 160-25 mg oral tablet   SIG: take 1 tablet by oral route once daily   DISP: (90) tablet with 1 refills  Adjusted on 04/02/2018     · Instructions  o Advised that cheeses and other sources of dairy fats, animal fats, fast food, and the extras (candy, pasteries, pies, doughnuts and cookies) all contain LDL raising nutrients. Advised to increase fruits, vegetables, whole grains, and to monitor portion sizes.   o Handouts were given to patient: medication management  o Take all medications as prescribed/directed.  o Patient was educated/instructed on their diagnosis, treatment and medications prior to discharge from the clinic today.  o Patient was instructed to exercise regularly.  o Chronic conditions reviewed  and taken into consideration for today's treatment plan.  · Disposition  o Return Visit Request in/on 3 months +/- 2 days (5639).            Electronically Signed by: JOHN Multani -Author on April 2, 2018 03:41:49 PM

## 2021-05-07 NOTE — PROGRESS NOTES
Progress Note      Patient Name: Nicole Holliday   Patient ID: [id number]   Sex: Female   YOB: 1940        Visit Date: June 18, 2020    Provider: JHON Booth   Location: [facility]   Location Address: [address] [place] [address]   [place]  [id number]   Location Phone: [phone]          Chief Complaint     PATIENT IS HERE FOR MEDICATION REFILLS AND FASTING LAB WORK.       History Of Present Illness  Nicole Holliday is a 79 year old /White female who presents for evaluation and treatment of:      pt here for her fasting labs and refills    DM: stable    HTN:  stable    OA: still having breakthrough pain of the hips and knee -and unable to take oral NSAID--and really needs the freq increased--and uses the topical voltaren    osteoporosis pt cannot tolerate the oral--hurts her stomach races her heart--but willing to try the prolia injection--pt''s daughter is an NP and can do the SC injection at her home--already does her B12 shots for her-    thyroid--stable--not as tired       Past Medical History  Disease Name Date Onset Notes   GERD (gastroesophageal reflux disease) --  --    Hyperlipidemia --  --    Hypertension, Benign Essential --  --    Hyperthyroidism --  --          Past Surgical History  Procedure Name Date Notes   Cholecstectomy --  --    Hysterectomy (due to cancer) --  --          Medication List  Name Date Started Instructions   Aspir-81 81 mg oral tablet,delayed release (DR/EC)  take 1 tablet (81 mg) by oral route once daily   clopidogrel 75 mg oral tablet 06/18/2020 take 1 tablet (75 mg) by oral route once daily for 90 days   cyanocobalamin (vitamin B-12) 1,000 mcg/mL injection solution 06/18/2020 inject 1 milliliter IM once monthly for 90 days   FreeStyle Lite Strips miscellaneous strip 01/14/2020 use as directed for 90 days check the glucose daily   FreeStyle System Kit miscellaneous kit 10/10/2018 check blood glucose once daily  "  Lancets,Ultra Thin miscellaneous misc 03/14/2019 use as directed for 90 days check glucse daily   levothyroxine 75 mcg oral tablet 06/18/2020 take 1 tablet (75 mcg) by oral route once daily on an empty stomach 30 minutes before breakfast for 90 days   Lyrica 75 mg oral capsule 06/18/2020 take 1 capsule by oral route once a day (at bedtime) for 90 days   metoprolol succinate 50 mg oral tablet extended release 24 hr  take 1 tablet by oral route 2 times a day   nitroglycerin 0.4 mg sublingual tablet, sublingual 06/18/2020 place 1 tab by buccal route at the first sign of an attack no more than 3 tabs are recommended within a 15 minute period   pantoprazole 40 mg oral tablet,delayed release (DR/EC) 06/18/2020 take 1 tablet (40 mg) by oral route once daily for 90 days   pravastatin 20 mg oral tablet 06/18/2020 take 1 tablet (20 mg) by oral route once daily at bedtime for 90 days   Syringe 3cc/22Gx3/4\" 3 mL 22 gauge x 3/4\" miscellaneous syringe 06/18/2020 use as directed once monthly IM injection for 90 days   tramadol 50 mg oral tablet 06/18/2020 take 1 tablet by oral route 3 times a day for 30 days   triamcinolone acetonide 0.1 % topical cream 08/29/2019 apply to affected area(s) by topical route 3 times a day as needed for 10 days itching   valsartan-hydrochlorothiazide 160-25 mg oral tablet 06/18/2020 take 1 tablet by oral route once daily for 90 days   Vitamin D2 50,000 unit oral capsule 03/28/2019 take 1 capsule (50,000 unit) by oral route once weekly for 90 days   Voltaren 1 % topical gel 06/18/2020 apply to affected area(s) by topical route 4 times a day for 90 days         Allergy List  Allergen Name Date Reaction Notes   Boniva --  racing heart --    Contrast media allergy --  --  --    PENICILLINS --  --  --        Allergies Reconciled  Family Medical History  Disease Name Relative/Age Notes   Family History of Colon Cancer Brother/  Father/   Father; Brother   Arthrtis Father/   Father         Social " History  Finding Status Start/Stop Quantity Notes   Alcohol Current - status unknown --/-- --  15 or more drinks per week   Second hand smoke exposure Unknown --/-- --  no   Tobacco Former --/68 0.5 PPD former smoker, smokes less than 1 pack per day, for less than 5 years, never uses other tobacco products   Uses seatbelts --  --/-- --  yes         Immunizations  NameDate Admin Mfg Trade Name Lot Number Route Inj VIS Given VIS Publication   HepA10/10/2018 [employer] HAVRIX-ADULT [id number] IM LD 10/10/2018 07/20/2016   Comments: NDC 38013-655-58   Dvuhbxiqq48/30/2019 [facility] FLUZONE-HIGH DOSE [id number] IM LA 09/30/2019    Comments: NDC 26211-067-23   Skocpvphq62/10/2018 [facility] FLUZONE-HIGH DOSE [id number] IM LD 10/10/2018 08/07/2015   Comments: NDC 35119-794-19         Review of Systems  · Constitutional  o Admits  o : fatigue  o Denies  o : fever  · HENT  o Denies  o : vertigo, lightheadedness, recent head injury  · Cardiovascular  o Denies  o : chest pain, irregular heart beats, lower extremity edema  · Respiratory  o Denies  o : shortness of breath, productive cough  · Gastrointestinal  o Denies  o : nausea, vomiting, heartburn, abdominal pain, blood in stools  · Genitourinary  o Denies  o : dysuria  · Integument  o Denies  o : rash, itching, hair growth change  · Neurologic  o Admits  o : tingling or numbness  o Denies  o : altered mental status, seizures, tremors  · Musculoskeletal  o Admits  o : joint pain, hip pain, knee pain, additional musculoskeletal symptoms except as noted in the HPI  o Denies  o : joint swelling, limitation of motion  · Endocrine  o Denies  o : cold intolerance, heat intolerance  · Psychiatric  o Denies  o : anxiety, depression, suicidal ideation, homicidal ideation  · Heme-Lymph  o Denies  o : petechiae, lymph node enlargement or tenderness  · Allergic-Immunologic  o Denies  o : frequent illnesses      Vitals  Date Time BP Position Site L\R Cuff Size HR RR TEMP (F) WT  HT  BMI kg/m2 BSA m2 O2 Sat HC      "  06/18/2020 10:03 /72 Sitting    88 - R  96.9 170lbs 7oz 5'  1\" 32.2 1.82 98 %          Physical Examination  · Constitutional  o Appearance  o : well developed, well-nourished, in no acute distress  · Head and Face  o HEENT  o : Unremarkable wearing her mask   · Eyes  o Conjunctivae  o : conjunctivae normal  · Neck  o Inspection/Palpation  o : supple  o Thyroid  o : no thyromegaly  · Respiratory  o Respiratory Effort  o : breathing unlabored  o Auscultation of Lungs  o : clear to ascultation  · Cardiovascular  o Heart  o :   § Auscultation of Heart  § : regular rate and rhythm  o Peripheral Vascular System  o :   § Extremities  § : no edema  · Gastrointestinal  o Abdominal Examination  o :   § Abdomen  § : soft nontender  · Lymphatic  o Neck  o : no lymphadenopathy present  · Musculoskeletal  o General  o :   § General Musculoskeletal  § : chronic neuropathic pain of the feet and the to the knees and hips (+) chronic pain and stiffness and limited ROM   · Skin and Subcutaneous Tissue  o General Inspection  o : skin turgor normal, texture normal  · Neurologic  o Gait and Station  o :   § Gait Screening  § : uses cane to walk   · Psychiatric  o Mood and Affect  o : mood normal, affect appropriate  · Left DM Foot Exam  o Sensation  o : abnormal sensory exam perceptible to 10-gram nylon monofilament exam and light touch on the bottom of the foot and some sensation of the toes and to the top of the foot   o Visual Inspection  o : visual inspection is normal with no signs of breakdown, ulcerations or deformities unless otherwise noted.   o Vascular  o : palpable dorsalis pedis and posterir tibialis pulses present, normal capillary refill  · Right DM Foot Exam  o Sensation  o : abnormal sensory exam perceptible to 10-gram nylon monofilament exam and light touch.to the entire bottom of the foot   o Visual Inspection  o : visual inspection is normal with no signs of breakdown, ulcerations or deformities unless " otherwise noted. second toe with the joint still swelled and slight pink--almost has a gout appearance   o Vascular  o : palpable dorsalis pedis and posterir tibialis pulses present, normal capillary refill          Assessment  · Vitamin B12 deficiency     266.2/E53.8  · Diabetes mellitus, type 2     250.00/E11.9  · Essential hypertension     401.9/I10  · Fatigue     780.79/R53.83  · Hypothyroidism     244.9/E03.9  · Osteoarthritis     715.90/M19.90  · Osteoporosis     733.00/M81.0  · Neuropathy     355.9/G62.9  · Toe deformity     735.9/M20.60    Problems Reconciled  Plan  · Orders  o CBC with Auto Diff [facility] (20159) - 250.00/E11.9 - 06/18/2020  o CMP [facility] (35637) - 250.00/E11.9 - 06/18/2020  o Hgb A1c [facility] (15841) - 250.00/E11.9 - 06/18/2020  o Lipid Panel [facility] (98258) - 250.00/E11.9 - 06/18/2020  o Diabetic Foot (Motor and Sensory) Exam Completed [facility] (, , 2028F) - 250.00/E11.9 - 06/18/2020  o Uric Acid (Serum) (66634) - 401.9/I10, 735.9/M20.60 - 06/18/2020  o CBC with Auto Diff [facility] (70438) - 780.79/R53.83, 250.00/E11.9, 401.9/I10 - 06/18/2020  o B12 Folate levels (B12FO) - 780.79/R53.83 - 06/18/2020  o Thyroid Profile (THYII) - 244.9/E03.9 - 06/18/2020  o Urinalysis with Reflex Microscopy if abnormal ([facility]) (41102) - 250.00/E11.9, 401.9/I10 - 06/18/2020  o ACO-39: Current medications updated and reviewed () - - 06/18/2020  · Medications  o Prolia 60 mg/mL subcutaneous syringe   SIG: inject 1 milliliter (60 mg) by subcutaneous route every 6 months in the upper arm, upper thigh or abdomen   DISP: (1) 1 ml syringe with 1 refills  Prescribed on 06/18/2020     o clopidogrel 75 mg oral tablet   SIG: take 1 tablet (75 mg) by oral route once daily for 90 days   DISP: (90) tablet with 1 refills  Adjusted on 06/18/2020     o cyanocobalamin (vitamin B-12) 1,000 mcg/mL injection solution   SIG: inject 1 milliliter IM once monthly for 90 days   DISP: (3) 1 ml vials with 3 refills  Adjusted on 06/18/2020  "    o levothyroxine 75 mcg oral tablet   SIG: take 1 tablet (75 mcg) by oral route once daily on an empty stomach 30 minutes before breakfast for 90 days   DISP: (90) tablets with 1 refills  Adjusted on 06/18/2020     o Lyrica 75 mg oral capsule   SIG: take 1 capsule by oral route once a day (at bedtime) for 90 days   DISP: (90) capsule with 0 refills  Adjusted on 06/18/2020     o nitroglycerin 0.4 mg sublingual tablet, sublingual   SIG: place 1 tab by buccal route at the first sign of an attack no more than 3 tabs are recommended within a 15 minute period   DISP: (1) Bottle with 1 refills  Adjusted on 06/18/2020     o pantoprazole 40 mg oral tablet,delayed release (DR/EC)   SIG: take 1 tablet (40 mg) by oral route once daily for 90 days   DISP: (90) tablet with 1 refills  Adjusted on 06/18/2020     o pravastatin 20 mg oral tablet   SIG: take 1 tablet (20 mg) by oral route once daily at bedtime for 90 days   DISP: (90) tablets with 1 refills  Adjusted on 06/18/2020     o Syringe 3cc/22Gx3/4\" 3 mL 22 gauge x 3/4\" miscellaneous syringe   SIG: use as directed once monthly IM injection for 90 days   DISP: (1) 10 ct box with 3 refills  Adjusted on 06/18/2020     o tramadol 50 mg oral tablet   SIG: take 1 tablet by oral route 3 times a day for 30 days   DISP: (90) tablet with 2 refills  Adjusted on 06/18/2020     o valsartan-hydrochlorothiazide 160-25 mg oral tablet   SIG: take 1 tablet by oral route once daily for 90 days   DISP: (90) tablets with 1 refills  Adjusted on 06/18/2020     o Voltaren 1 % topical gel   SIG: apply to affected area(s) by topical route 4 times a day for 90 days   DISP: (3) 100 gm tube with 1 refills  Adjusted on 06/18/2020     o Medications have been Reconciled  o Transition of Care or Provider Policy  · Instructions  o Continue blood sugar monitoring daily and record. Bring your log to office visits. Call the office for readings below 70 and above 250 or any complications.  o Daily foot care. " Avoid walking barefoot. Annual Dilated Eye Exam.  o Discussed with patient blood pressure monitoring, hemoglobin A1C levels need to be below 7.0, and LDL (Lipid) goals below 70.  o Patient advised to monitor blood pressure (B/P) at home and journal readings. Patient informed that a B/P reading at home of more than 135/85 is considered hypertension. For readings greater txil027/90 or higher patient is advised to follow up in the office with readings for management. Patient advised to limit sodium intake.  o Tylenol may be used as needed every 4-6 hours for pain.  o Obtained a written consent for ANASTASIYA query. Discussed the risk and benefits of the use of controlled substances with the patient, including the risk of tolerance and drug dependence. The patient has been counseled on the need to have an exit strategy, including potentially discontinuing the use of controlled substances. ANASTASIYA has or will be reviewed as soon as it becomes avaliable.  o Take all medications as prescribed/directed.  o Patient was educated/instructed on their diagnosis, treatment and medications prior to discharge from the clinic today.  o Patient instructed to seek medical attention urgently for new or worsening symptoms.  o Call the office with any concerns or questions.  o Risks, benefits, and alternatives were discussed with the patient. The patient is aware of risks associated with: CSuse   o Chronic conditions reviewed and taken into consideration for today's treatment plan.  · Disposition  o Call or Return if symptoms worsen or persist.  o Return Visit Request in/on 6 months +/- 2 days (0012).     *********MAIL PT ALL LABS FOR HER CARDIOLOGIST**********    f/u 3 months if needs refills on the tramadol or lyrica     pt shows no S/S of misuse nor abuse and takes meds as prescribed and very compliant             Electronically Signed by: Brenda Son APRN -Author on June 18, 2020 12:43:03 PM

## 2021-05-07 NOTE — PROGRESS NOTES
Progress Note      Patient Name: Nicole Holliday   Patient ID: [id number]   Sex: Female   YOB: 1940        Visit Date: August 29, 2019    Provider: AIME Booth   Location: [facility]   Location Address: [address] [place] [address] [place]  [address]   Location Phone: [phone]          Chief Complaint     refills and check up       History Of Present Illness  Nicole Holliday is a 78 year old /White female who presents for evaluation and treatment of:      refills and labs and pt also has a possible spider bite to the inner Right FA --noticed a red circular area--3 days ago and was small like dime-nickel sized then grew and now extends over the inner F--and tender and heat in it and itchy     HTN--stable  cholesterol--stable  severe OA--stable with the tramadol   diet-controlled diabetes--stable with diet  hypothyroidism-stable   CAD-no CP and just saw DR Luis recently--fax copy of labs to Dr Luis-  GERD-stable  pacemaker-stable  DJD of knees-stable            Past Medical History  Disease Name Date Onset Notes   GERD (gastroesophageal reflux disease) --  --    Hyperlipidemia --  --    Hypertension, Benign Essential --  --    Hyperthyroidism --  --          Past Surgical History  Procedure Name Date Notes   Cholecstectomy --  --    Hysterectomy (due to cancer) --  --          Medication List  Name Date Started Instructions   Aspir-81 81 mg oral tablet,delayed release (/EC)  take 1 tablet (81 mg) by oral route once daily   Boniva 150 mg oral tablet 04/22/2019 take 1 tablet by PO once a month (same date) with a full glass of water and remain in upright position at least 1 hour.   clopidogrel 75 mg oral tablet 08/29/2019 take 1 tablet (75 mg) by oral route once daily for 90 days   cyanocobalamin (vitamin B-12) 1,000 mcg/mL injection solution 08/29/2019 inject 1 milliliter IM once monthly for 90 days   FreeStyle Lite Strips miscellaneous strip  "03/14/2019 use as directed for 90 days check the glucose daily   FreeStyle System Kit miscellaneous kit 10/10/2018 check blood glucose once daily   Lancets,Ultra Thin miscellaneous misc 03/14/2019 use as directed for 90 days check glucse daily   levothyroxine 50 mcg oral tablet 08/29/2019 take 1 tablet (50 mcg) by oral route once daily for 90 days   Lyrica 75 mg oral capsule 08/29/2019 take 1 capsule by oral route once a day (at bedtime) for 90 days   metoprolol succinate 50 mg oral tablet extended release 24 hr  take 1 tablet by oral route 2 times a day   nitroglycerin 0.4 mg sublingual tablet, sublingual  place 1 tablet (0.4 mg) by buccal route at the first sign of an attack; no more than 3 tabs are recommended within a 15 minute period.   pantoprazole 40 mg oral tablet,delayed release (DR/EC) 08/29/2019 take 1 tablet (40 mg) by oral route once daily for 90 days   pravastatin 20 mg oral tablet 08/29/2019 take 1 tablet (20 mg) by oral route once daily at bedtime for 90 days   Syringe 3cc/22Gx3/4\" 3 mL 22 gauge x 3/4\" miscellaneous syringe 08/29/2019 use as directed once monthly IM injection for 90 days   tramadol 50 mg oral tablet 08/29/2019 take 1 tablet by oral route 2 times a day for 30 days   valsartan-hydrochlorothiazide 160-25 mg oral tablet 08/29/2019 take 1 tablet by oral route once daily for 90 days   Vitamin D2 50,000 unit oral capsule 03/28/2019 take 1 capsule (50,000 unit) by oral route once weekly for 90 days   Voltaren 1 % topical gel 08/29/2019 apply 2 gram to the affected area(s) by topical route 4 times per day for 90 days         Allergy List  Allergen Name Date Reaction Notes   Contrast media allergy --  --  --    PENICILLINS --  --  --          Family Medical History  Disease Name Relative/Age Notes   Family History of Colon Cancer Brother/  Father/   Father; Brother   Arthrtis Father/   Father         Social History  Finding Status Start/Stop Quantity Notes   Alcohol Current - status unknown " --/-- --  15 or more drinks per week   Second hand smoke exposure Unknown --/-- --  no   Tobacco Former --/68 0.5 PPD former smoker, smokes less than 1 pack per day, for less than 5 years, never uses other tobacco products   Uses seatbelts --  --/-- --  yes         Immunizations  NameDate Admin Mfg Trade Name Lot Number Route Inj VIS Given VIS Publication   HepA10/10/2018 SKB HAVRIX-ADULT [id number] IM LD 10/10/2018 07/20/2016   Comments: NDC 29636-023-02   Kvlwnsepm90/10/2018 PMC FLUZONE-HIGH DOSE [id number] IM LD 10/10/2018 08/07/2015   Comments: NDC 74372-561-02         Review of Systems  · Constitutional  o Denies  o : fever  · HENT  o Denies  o : hearing loss or ringing, chronic sinus problem, swollen glands in neck  · Cardiovascular  o Denies  o : chest pain, palpitations (fast, fluttering, or skipping beats), swelling (feet, ankles, hands), shortness of breath while walking or lying flat  · Respiratory  o Denies  o : shortness of breath, asthma or wheezing, COPD  · Gastrointestinal  o Denies  o : abdominal pain, blood in stools, nausea or vomiting  · Genitourinary  o Denies  o : dysuria, hematuria  · Integument  o Admits  o : rash, itching, pigmentation changes, additional integumentary symptoms except as noted in the HPI  · Neurologic  o Admits  o : tingling or numbness  · Musculoskeletal  o Admits  o : joint pain, back pain, knee pain  · Endocrine  o Denies  o : thyroid disease, diabetes, heat or cold intolerance, excessive thirst or urination  · Psychiatric  o Denies  o : suicidal ideation, homicidal ideation  · Heme-Lymph  o Denies  o : bleeding or bruising tendency, anemia  · Allergic-Immunologic  o Denies  o : frequent illnesses      Vitals  Date Time BP Position Site L\R Cuff Size HR RR TEMP (F) WT  HT  BMI kg/m2 BSA m2 O2 Sat        08/29/2019 08:53 /80 Sitting    74 - R  97.2 169lbs 0oz    97 %          Physical Examination  · Constitutional  o Appearance  o : well developed, well-nourished, in no  acute distress  · Head and Face  o HEENT  o : Unremarkable  · Eyes  o Conjunctivae  o : conjunctivae normal  · Neck  o Inspection/Palpation  o : supple  o Thyroid  o : no thyromegaly  · Respiratory  o Respiratory Effort  o : breathing unlabored  o Auscultation of Lungs  o : clear to ascultation  · Cardiovascular  o Heart  o :   § Auscultation of Heart  § : regular rate and rhythm  o Peripheral Vascular System  o :   § Extremities  § : no edema  · Gastrointestinal  o Abdominal Examination  o :   § Abdomen  § : soft nontender  · Lymphatic  o Neck  o : no lymphadenopathy present  · Musculoskeletal  o General  o :   § General Musculoskeletal  § : uses the cane to walk--and bilat knees swelled and arthritic   · Skin and Subcutaneous Tissue  o General Inspection  o : skin turgor normal, texture normal--(+) inner right FA redness and heat to the area--and extends approx. 8cm x 9cm   · Neurologic  o Gait and Station  o :   § Gait Screening  § : uses the cane   · Psychiatric  o Mood and Affect  o : mood normal, affect appropriate  · Left DM Foot Exam  o Sensation  o : abnormal sensory exam perceptible to 10-gram nylon monofilament exam and light touch.--a little sensation on the top of the toes and then none anywhere else until approx. 1/3 ip on the shin   o Visual Inspection  o : visual inspection is normal with no signs of breakdown, ulcerations or deformities unless otherwise noted.   o Vascular  o : palpable dorsalis pedis and posterir tibialis pulses present, normal capillary refill  · Right DM Foot Exam  o Sensation  o : normal sensory exam perceptible to 10-gram nylon monofilament exam (5/5), and light touch.  o Visual Inspection  o : visual inspection is normal with no signs of breakdown, ulcerations or deformities unless otherwise noted.   o Vascular  o : palpable dorsalis pedis and posterir tibialis pulses present, normal capillary refill          Assessment  · Vitamin B12 deficiency     266.2/E53.8  · Diabetes  mellitus, type 2     250.00/E11.9  · Essential hypertension     401.9/I10  · GERD (gastroesophageal reflux disease)     530.81/K21.9  · Hyperlipidemia     272.4/E78.5  · Hypothyroidism     244.9/E03.9  · Osteoarthritis     715.90/M19.90  · Vitamin D deficiency     268.9/E55.9  · Cellulitis     682.9/L03.90  · Diet-controlled diabetes mellitus     250.00/E11.9  · Pacemaker     V45.01/Z95.0  · CAD (coronary artery disease)     414.00/I25.10      Plan  · Orders  o B12 Folate levels (B12FO) - 266.2/E53.8 - 08/29/2019  o CBC with Auto Diff [facility] (75386) - 250.00/E11.9 - 08/29/2019  o CMP [facility] (58986) - 250.00/E11.9 - 08/29/2019  o Hgb A1c [facility] (69362) - 250.00/E11.9 - 08/29/2019  o Lipid Panel [facility] (17811) - 250.00/E11.9 - 08/29/2019  o Thyroid Profile (THYII) - 250.00/E11.9, 244.9/E03.9 - 08/29/2019  o ACO-27: Most recent 2019 HgbA1c less than 7 [facility] (3044F) - 250.00/E11.9 - 08/29/2019  o Diabetic Foot (Motor and Sensory) Exam Completed [facility] (, , 2028F) - 250.00/E11.9 - 08/29/2019  o Vitamin D Level (10082) - 268.9/E55.9 - 08/29/2019  o Urinalysis with Reflex Microscopy if abnormal ([facility]) (67641) - 250.00/E11.9, 401.9/I10 - 08/29/2019  o ACO-18: Negative screen for clinical depression using a standardized tool () - - 08/29/2019   0  o ACO-39: Current medications updated and reviewed () - - 08/29/2019  o Solu-Medrol Injection 40mg (-3) - 682.9/L03.90 - 08/29/2019   Injection - Solu-Medrol 40mg; Dose: 1mL; Site: Left Gluteus; Route: intramuscular; Date: 08/29/2019 10:27:05; Exp: 12/01/2020; Lot: [id number]; Mfg: EMELIA/ALBERT; TradeName: methylprednisolone sod suc(PF); Location: [facility], [place], operated [facility]; Administered By: Mary Jane Kumar MA; Comment: NDC: 9154-4354-75  o IM - Injection Fee [id number] - 682.9/L03.90 - 08/29/2019  · Medications  o triamcinolone acetonide 0.1 % topical cream   SIG: apply to affected area(s) by topical route 3 times a day as needed for 10 days itching   DISP: (1) 15 gm  "tube with 0 refills  Adjusted on 08/29/2019     o Bactrim -160 mg oral tablet   SIG: take 1 tablet by oral route every 12 hours for 10 days   DISP: (20) tablets with 0 refills  Adjusted on 08/29/2019     o clopidogrel 75 mg oral tablet   SIG: take 1 tablet (75 mg) by oral route once daily for 90 days   DISP: (90) tablet with 1 refills  Adjusted on 08/29/2019     o cyanocobalamin (vitamin B-12) 1,000 mcg/mL injection solution   SIG: inject 1 milliliter IM once monthly for 90 days   DISP: (1) 30 ml vial with 0 refills  Adjusted on 08/29/2019     o levothyroxine 50 mcg oral tablet   SIG: take 1 tablet (50 mcg) by oral route once daily for 90 days   DISP: (90) tablet with 1 refills  Adjusted on 08/29/2019     o Lyrica 75 mg oral capsule   SIG: take 1 capsule by oral route once a day (at bedtime) for 90 days   DISP: (90) capsule with 0 refills  Adjusted on 08/29/2019     o pantoprazole 40 mg oral tablet,delayed release (DR/EC)   SIG: take 1 tablet (40 mg) by oral route once daily for 90 days   DISP: (90) tablet with 1 refills  Adjusted on 08/29/2019     o pravastatin 20 mg oral tablet   SIG: take 1 tablet (20 mg) by oral route once daily at bedtime for 90 days   DISP: (90) tablets with 1 refills  Adjusted on 08/29/2019     o Syringe 3cc/22Gx3/4\" 3 mL 22 gauge x 3/4\" miscellaneous syringe   SIG: use as directed once monthly IM injection for 90 days   DISP: (1) 100 ct box with 0 refills  Adjusted on 08/29/2019     o tramadol 50 mg oral tablet   SIG: take 1 tablet by oral route 2 times a day for 30 days   DISP: (60) tablet with 2 refills  Adjusted on 08/29/2019     o valsartan-hydrochlorothiazide 160-25 mg oral tablet   SIG: take 1 tablet by oral route once daily for 90 days   DISP: (90) tablets with 1 refills  Adjusted on 08/29/2019     o Voltaren 1 % topical gel   SIG: apply 2 gram to the affected area(s) by topical route 4 times per day for 90 days   DISP: (1) 100 gm tube with 1 refills  Adjusted on 08/29/2019 "     · Instructions  o Continue blood sugar monitoring daily and record. Bring your log to office visits. Call the office for readings below 70 and above 250 or any complications.  o Daily foot care. Avoid walking barefoot. Annual Dilated Eye Exam.  o Discussed with patient blood pressure monitoring, hemoglobin A1C levels need to be below 7.0, and LDL (Lipid) goals below 70.  o Patient advised to monitor blood pressure (B/P) at home and journal readings. Patient informed that a B/P reading at home of more than 135/85 is considered hypertension. For readings greater [id number]/90 or higher patient is advised to follow up in the office with readings for management. Patient advised to limit sodium intake.  o Maintain a healthy weight. Avoid tight fitting clothes. Avoid fried, fatty foods, tomato sauce, chocolate, mint, garlic, onion, alcohol. caffeine. Eat smaller meals, dont lie down after a meal, dont smoke. Elevate the head of your bed 6-9 inches.  o Recommended exercise program to assist with cholesterol, weight loss and overall health improvement.  o Advised that cheeses and other sources of dairy fats, animal fats, fast food, and the extras (candy, pasteries, pies, doughnuts and cookies) all contain LDL raising nutrients. Advised to increase fruits, vegetables, whole grains, and to monitor portion sizes.   o Tylenol may be used as needed every 4-6 hours for pain.  o Recheck Vitamin D blood level in 8-12 weeks.  o Obtained a written consent for ANASTASIYA query. Discussed the risk and benefits of the use of controlled substances with the patient, including the risk of tolerance and drug dependence. The patient has been counseled on the need to have an exit strategy, including potentially discontinuing the use of controlled substances. ANASTASIYA has or will be reviewed as soon as it becomes avaliable.  o Take all medications as prescribed/directed.  o Patient was educated/instructed on their diagnosis, treatment and  medications prior to discharge from the clinic today.  o Patient instructed to seek medical attention urgently for new or worsening symptoms.  o Call the office with any concerns or questions.  o Risks, benefits, and alternatives were discussed with the patient. The patient is aware of risks associated with: CS use  o Chronic conditions reviewed and taken into consideration for today's treatment plan.  · Disposition  o Call or Return if symptoms worsen or persist.  o Return Visit Request in/on 3 months +/- 2 days (5343).     ******MAIL COPIES OF ALL LABS TO PT AND FAX COPIES TO DR AMANDA CARDIOLOGIST*****    panda was appropriate--shows no S/S of misuse nor abuse--and the urine tox screen was done this yr--             Electronically Signed by: AIME Booth -Author on August 29, 2019 10:57:44 AM

## 2021-05-07 NOTE — PROGRESS NOTES
Progress Note      Patient Name: Nicole Holliday   Patient ID: [id number]   Sex: Female   YOB: 1940        Visit Date: March 14, 2019    Provider: AIME Booth   Location: [facility]   Location Address: [address] [place] [address]  [id number]   Location Phone: [phone]          Chief Complaint     here for medication refills, wanting to discuss changing medications. also needs labs.  also wanting a steroid shot.       History Of Present Illness  Nicole Holliday is a 78 year old /White female who presents for evaluation and treatment of:      1) pt here for the refills and fasting labs--  HTN-stable--but the pt states retaining fluid in the ankles--  cholesterol--stable  thyroid--stable  GERD--stable  CAD-pacemaker--stable   DM--diet controlled     2) had to be changed from t valsartan/HCTZ 160/25 (MYLAN) to the losartan/HCTZ 100/25--ad pt feels like the change wasn't good and making her sick at her stomach and retaining fluid wants to be changed back--as [facility]--    3) pt was on the Voltaren gel 1% and she uses this for the knees mainly--and fingers and needs a refills of this--and for the severe OA of the knees pt usually well controlled on the tramadol---    4) pt also having terrible knee pain and swelling from the severe OA--for 2 weeks     5) pt was having myalgias as well and then pt help the atorvastatin and then the myalgias resolved and would like to change to a different one that maybe does not cause these s/s--    6) also the neuropathy stable on the lyrica --and without the lyrica feet burn and hurt--    needs all sent through mail order     and sees Dr Juve as well--for the OA       Past Medical History  Disease Name Date Onset Notes   GERD (gastroesophageal reflux disease) --  --    Hyperlipidemia --  --    Hypertension, Benign Essential --  --    Hyperthyroidism --  --          Past Surgical  History  Procedure Name Date Notes   Cholecstectomy --  --    Hysterectomy (due to cancer) --  --          Medication List  Name Date Started Instructions   Aspir-81 81 mg oral tablet,delayed release (DR/EC)  take 1 tablet (81 mg) by oral route once daily   atorvastatin 20 mg oral tablet 10/10/2018 take 1 tablet (20 mg) by oral route once daily for 90 days   clopidogrel 75 mg oral tablet 10/10/2018 take 1 tablet (75 mg) by oral route once daily for 90 days   FreeStyle Lite Strips miscellaneous strip 10/10/2018 check glucose 2 times a day   FreeStyle System Kit miscellaneous kit 10/10/2018 check blood glucose once daily   levothyroxine 50 mcg oral tablet 10/10/2018 take 1 tablet (50 mcg) by oral route once daily for 90 days   losartan-hydrochlorothiazide 100-25 mg oral tablet 02/12/2019 take 1 tablet by oral route once daily for 90 days   Lyrica 75 mg oral capsule 10/10/2018 take 1 capsule by oral route once a day (at bedtime) for 90 days   metoprolol succinate 50 mg oral tablet extended release 24 hr  take 1 tablet by oral route 2 times a day   nitroglycerin 0.4 mg sublingual tablet, sublingual  place 1 tablet (0.4 mg) by buccal route at the first sign of an attack; no more than 3 tabs are recommended within a 15 minute period.   pantoprazole 40 mg oral tablet,delayed release (DR/EC) 10/10/2018 take 1 tablet (40 mg) by oral route once daily for 90 days   tramadol 50 mg oral tablet 10/10/2018 take 1 tablet by oral route 2 times a day for 30 days   Voltaren 1 % topical gel  apply 2 gram to the affected area(s) by topical route 4 times per day         Allergy List  Allergen Name Date Reaction Notes   Contrast media allergy --  --  --    PENICILLINS --  --  --          Family Medical History  Disease Name Relative/Age Notes   Arthrtis / Father    Father/    Family History of Colon Cancer / Father; Brother    Brother/     Father/          Social History  Finding Status Start/Stop Quantity Notes   Alcohol Current - status  unknown --/-- --  15 or more drinks per week   Second hand smoke exposure Unknown --/-- --  no   Tobacco Former --/68 0.5 PPD former smoker, smokes less than 1 pack per day, for less than 5 years, never uses other tobacco products   Uses seatbelts --  --/-- --  yes         Immunizations  NameDate Admin Mfg Trade Name Lot Number Route Inj VIS Given VIS Publication   HepA10/10/2018 SKB Havrix Adult [id number] IM LD 10/10/2018 07/20/2016   Comments: NDC 28902-587-29   Bzrhinwko85/10/2018 PMC Fluzone High-Dose [id number] IM LD 10/10/2018 08/07/2015   Comments: NDC 45416-632-53         Review of Systems  · Constitutional  o Admits  o : fatigue  o Denies  o : fever, chills, body aches  · HENT  o Denies  o : vertigo, recent head injury, sore throat  · Cardiovascular  o Denies  o : chest pain, irregular heart beats, rapid heart rate  · Respiratory  o Denies  o : shortness of breath, productive cough  · Gastrointestinal  o Admits  o : heartburn  o Denies  o : nausea, vomiting  · Genitourinary  o Denies  o : dysuria, urinary retention  · Integument  o Denies  o : hair growth change, new skin lesions  · Neurologic  o Admits  o : tingling or numbness  o Denies  o : altered mental status, seizures, tremors, loss of balance  · Musculoskeletal  o Admits  o : joint pain, neck pain, back pain, shoulder pain, hip pain, knee pain, ankle pain, foot pain, additional musculoskeletal symptoms except as noted in the HPI  · Endocrine  o Denies  o : cold intolerance, heat intolerance  · Psychiatric  o Denies  o : anxiety, depression, suicidal ideation, homicidal ideation  · Heme-Lymph  o Denies  o : petechiae, lymph node enlargement or tenderness  · Allergic-Immunologic  o Denies  o : frequent illnesses      Vitals  Date Time BP Position Site L\R Cuff Size HR RR TEMP(F) WT  HT  BMI kg/m2 BSA m2 O2 Sat        03/14/2019 09:31 /68 Sitting    79 - R  97.9 168lbs 8oz    96 %           Physical Examination  · Constitutional  o Appearance  o :  well developed, well-nourished, in no acute distress--pt up 3# from her last visit   · Head and Face  o HEENT  o : Unremarkable  · Eyes  o Conjunctivae  o : conjunctivae normal  · Neck  o Inspection/Palpation  o : supple  o Thyroid  o : no thyromegaly  · Respiratory  o Respiratory Effort  o : breathing unlabored  o Auscultation of Lungs  o : clear to ascultation  · Cardiovascular  o Heart  o :   § Auscultation of Heart  § : regular rate and rhythm  o Peripheral Vascular System  o :   § Extremities  § : no edema  · Gastrointestinal  o Abdominal Examination  o :   § Abdomen  § : soft nontender (+) BS  · Lymphatic  o Neck  o : no lymphadenopathy present  · Musculoskeletal  o General  o :   § General Musculoskeletal  § : severe OA of the hands and knees--area the worse and then multiple joints affected--to the hips and shoulders neck and n]back --moves stiffly--and then the left knee very swelled and tender-and bilat hands with the nodules noted to every finger  · Skin and Subcutaneous Tissue  o General Inspection  o : skin turgor normal, texture normal  · Neurologic  o Gait and Station  o :   § Gait Screening  § : pt walks stiffly due to the severe OA of the knees   · Psychiatric  o Mood and Affect  o : mood normal, affect appropriate  · Left DM Foot Exam  o Sensation  o : abnormal sensory exam perceptible to 10-gram nylon monofilament exam and light touch.--only sensation tot he top of the toes and foot--none on the bottom   o Visual Inspection  o : visual inspection is normal with no signs of breakdown, ulcerations or deformities unless otherwise noted. slight bunion and hammertoes   o Vascular  o : palpable dorsalis pedis and posterir tibialis pulses present, normal capillary refill  · Right DM Foot Exam  o Sensation  o : abnormal sensory exam perceptible to 10-gram nylon monofilament exam and light touch. only sensation to the toes top and bottom and to the top f foot   o Visual Inspection  o : visual inspection is  normal with no signs of breakdown, ulcerations or deformities unless otherwise noted. slight bunion and hammertoes  o Vascular  o : palpable dorsalis pedis and posterir tibialis pulses present, normal capillary refill          Assessment  · Essential hypertension     401.9/I10  · GERD (gastroesophageal reflux disease)     530.81/K21.9  · Hyperlipidemia     272.4/E78.5  · Hypothyroidism     244.9/E03.9  · Osteoarthritis     715.90/M19.90  · High risk medication use     V58.69/Z79.899  · Diet-controlled diabetes mellitus     250.00/E11.9  · CAD (coronary artery disease)     414.00/I25.10  · DJD (degenerative joint disease) of knee     715.36/M17.10  · Pacemaker     V45.01/Z95.0  · Paresthesia     782.0/R20.2  · Neuropathy     355.9/G62.9      Plan  · Orders  o CBC with Auto Diff [facility] ([id number]) - 401.9/I10 - 03/14/2019  o CMP [facility] ([id number]) - 401.9/I10 - 03/14/2019  o Lipid Panel [facility] ([id number]) - 272.4/E78.5 - 03/14/2019  o Hgb A1c [facility] ([id number]) - 250.00/E11.9 - 03/14/2019  o Thyroid Profile (THYII) - 401.9/I10, 272.4/E78.5, 250.00/E11.9, 244.9/E03.9 - 03/14/2019  o Urinalysis with Reflex Microscopy if abnormal ([facility]) ([id number]) - 401.9/I10, 250.00/E11.9 - 03/14/2019  o Urine Drug Screen ([facility]) (80473) - V58.69/Z79.899, 715.90/M19.90, 715.36/M17.10 - 03/14/2019  o ACO-39: Current medications updated and reviewed () - - 03/14/2019  o Solu-Medrol Injection 40mg (-2) - 715.90/M19.90, 715.36/M17.10 - 03/14/2019   Injection - Solu-Medrol 40mg; Dose: 1mL; Site: Right Gluteus; Route: intramuscular; Date: 03/14/2019 10:39:40; Exp: 07/01/2020; Lot: [id number]; Mfg: [employer]AL[employer]ERT; TradeName: methylprednisolone sod suc(PF); Location: [facility]; Administered By: Mary Jane Kumar MA; Comment: NDC 1572-5337-70  o IM - Injection Fee (31347) - 715.90/M19.90, 715.36/M17.10 - 03/14/2019  o Vitamin B-12 (39560) with Folate(93752) (46803\VF) - 250.00/E11.9, 355.9/G62.9 - 03/14/2019  · Medications  o pravastatin 20 mg oral  tablet   SIG: take 1 tablet (20 mg) by oral route once daily at bedtime   DISP: (90) tablets with 1 refills  Prescribed on 03/14/2019     o valsartan-hydrochlorothiazide 160-25 mg oral tablet   SIG: take 1 tablet by oral route once daily   DISP: (90) tablets with 1 refills  Prescribed on 03/14/2019     o Lancets,Ultra Thin miscellaneous misc   SIG: use as directed for 90 days check glucse daily   DISP: (1) 100 ct box with 3 refills  Prescribed on 03/14/2019     o clopidogrel 75 mg oral tablet   SIG: take 1 tablet (75 mg) by oral route once daily for 90 days   DISP: (90) tablet with 1 refills  Adjusted on 03/14/2019     o FreeStyle Lite Strips miscellaneous strip   SIG: use as directed for 90 days check the glucose daily   DISP: (1) 100 ct box with 3 refills  Adjusted on 03/14/2019     o levothyroxine 50 mcg oral tablet   SIG: take 1 tablet (50 mcg) by oral route once daily for 90 days   DISP: (90) tablet with 1 refills  Adjusted on 03/14/2019     o Lyrica 75 mg oral capsule   SIG: take 1 capsule by oral route once a day (at bedtime) for 90 days   DISP: (90) capsule with 0 refills  Adjusted on 03/14/2019     o pantoprazole 40 mg oral tablet,delayed release (DR/EC)   SIG: take 1 tablet (40 mg) by oral route once daily for 90 days   DISP: (90) tablet with 1 refills  Adjusted on 03/14/2019     o tramadol 50 mg oral tablet   SIG: take 1 tablet by oral route 2 times a day for 30 days   DISP: (60) tablet with 2 refills  Adjusted on 03/14/2019     o Voltaren 1 % topical gel   SIG: apply 2 gram to the affected area(s) by topical route 4 times per day for 90 days   DISP: (1) 100 gm tube with 0 refills  Adjusted on 03/14/2019     o atorvastatin 20 mg oral tablet   SIG: take 1 tablet (20 mg) by oral route once daily for 90 days   DISP: (90) tablet with 1 refills  Discontinued on 03/14/2019     o losartan-hydrochlorothiazide 100-25 mg oral tablet   SIG: take 1 tablet by oral route once daily for 90 days   DISP: (90) tablets with  0 refills  Discontinued on 03/14/2019     · Instructions  o Patient advised to monitor blood pressure (B/P) at home and journal readings. Patient informed that a B/P reading at home of more than 135/85 is considered hypertension. For readings greater [id number]/90 or higher patient is advised to follow up in the office with readings for management. Patient advised to limit sodium intake.  o Maintain a healthy weight. Avoid tight fitting clothes. Avoid fried, fatty foods, tomato sauce, chocolate, mint, garlic, onion, alcohol. caffeine. Eat smaller meals, dont lie down after a meal, dont smoke. Elevate the head of your bed 6-9 inches.  o Recommended exercise program to assist with cholesterol, weight loss and overall health improvement.  o Advised that cheeses and other sources of dairy fats, animal fats, fast food, and the extras (candy, pasteries, pies, doughnuts and cookies) all contain LDL raising nutrients. Advised to increase fruits, vegetables, whole grains, and to monitor portion sizes.   o Tylenol may be used as needed every 4-6 hours for pain.  o Obtained a written consent for ANASTASIYA query. Discussed the risk and benefits of the use of controlled substances with the patient, including the risk of tolerance and drug dependence. The patient has been counseled on the need to have an exit strategy, including potentially discontinuing the use of controlled substances. ANASTASIYA has or will be reviewed as soon as it becomes avaliable.  o Take all medications as prescribed/directed.  o Patient was educated/instructed on their diagnosis, treatment and medications prior to discharge from the clinic today.  o Patient instructed to seek medical attention urgently for new or worsening symptoms.  o Call the office with any concerns or questions.  o Risks, benefits, and alternatives were discussed with the patient. The patient is aware of risks associated with: CS use  o Chronic conditions reviewed and taken into consideration  for today's treatment plan.  · Disposition  o Call or Return if symptoms worsen or persist.  o Return Visit Request in/on 3 months +/- 2 days (1135).     shows no s/s of abuse nor abuse             Electronically Signed by: AIME Booth -Author on March 14, 2019 11:23:22 AM

## 2021-05-07 NOTE — PROGRESS NOTES
Progress Note      Patient Name: Nicole Holliday   Patient ID: [id number]   Sex: Female   YOB: 1940        Visit Date: September 8, 2020    Provider: JHON Booth   Location: [facility]   [place] Address[place] [facility] [address] [place]  [address]   Location Phone: [phone]          Chief Complaint  · Annual Wellness Exam      History Of Present Illness  The patient is a 79 year old /White female who has come to this office for her Annual Wellness Visit.   Her Primary Care Provider is Brenda FERREIRA. Her comprehensive Care Team list, including suppliers, has been updated on the Facesheet. Her medical/family history, height, weight, BMI, and blood pressure have been reviewed and are in the chart. The Health Risk Assessment has been completed and scanned in the chart.   Medications are listed in the medication list.   The active problem list includes: GERD (gastroesophageal reflux disease), Hyperlipidemia, Hypertension, Benign Essential, and Hyperthyroidism   The patient does not have a history of substance use.   Patient reports her diet is adequate.   The Mini-Cog has been administered and is scanned in chart. The results are negative. Her cognitive function is without limitation.   A hearing loss screen was completed today and the result is negative.   Patient does not have any risk factors for depression. Patient completed the PHQ-9 today and it has been scanned in the chart. The total score is 1-4.   The Timed Up and Go screen was administered today and the result is negative.   The Smiley Index of [place] in ADLs indicated full function (score of 6).   A Falls Risk Assessment has been completed, including a review of home fall hazards and medication review.   Overall, the patient's functional ability is noted by this provider to be within normal limits. Her level of safety is noted to be within normal limits. Her balance/gait is within normal  limits. There have been no falls in the past year. Patient-specific home safety recommendations have been reviewed and a copy has been given to patient.   She denies issues with leaking urine.   There are no additional risk factors identified.   Living Will/Advanced Directive previously executed but not in chart.   Personalized health advice was given to the patient and a written health screening schedule was established; see Plan for details.      also pt with CKD stage 3--and unable to take NSAIDs  then from the med refill list0--appears the lyrica and tramadol may be due today --and we neglected to look at this today --so MA is calling to find out--pt never shows any s/s of misuse nor abuse and always takes her meds as directed--we've had to leave pt a message regarding this and she never did call back and we've waited all day       Past Medical History  Disease Name Date Onset Notes   CKD (chronic kidney disease) stage 3, GFR 30-59 ml/min --  --    GERD (gastroesophageal reflux disease) --  --    Gout --  --    Hyperlipidemia --  --    Hypertension, Benign Essential --  --    Hyperthyroidism --  --          Past Surgical History  Procedure Name Date Notes   Cholecstectomy --  --    Hysterectomy (due to cancer) --  --          Medication List  Name Date Started Instructions   Aspir-81 81 mg oral tablet,delayed release (DR/EC)  take 1 tablet (81 mg) by oral route once daily   clopidogrel 75 mg oral tablet 06/18/2020 take 1 tablet (75 mg) by oral route once daily for 90 days   cyanocobalamin (vitamin B-12) 1,000 mcg/mL injection solution 06/18/2020 inject 1 milliliter IM once monthly for 90 days   FreeStyle Lite Strips miscellaneous strip 01/14/2020 use as directed for 90 days check the glucose daily   FreeStyle System Kit miscellaneous kit 10/10/2018 check blood glucose once daily   Lancets,Ultra Thin miscellaneous misc 03/14/2019 use as directed for 90 days check glucse daily   levothyroxine 75 mcg oral tablet  "06/18/2020 take 1 tablet (75 mcg) by oral route once daily on an empty stomach 30 minutes before breakfast for 90 days   Lyrica 75 mg oral capsule 06/18/2020 take 1 capsule by oral route once a day (at bedtime) for 90 days   metoprolol succinate 50 mg oral tablet extended release 24 hr  take 1 tablet by oral route 2 times a day   nitroglycerin 0.4 mg sublingual tablet, sublingual 06/18/2020 place 1 tab by buccal route at the first sign of an attack no more than 3 tabs are recommended within a 15 minute period   pantoprazole 40 mg oral tablet,delayed release (DR/EC) 06/18/2020 take 1 tablet (40 mg) by oral route once daily for 90 days   pravastatin 20 mg oral tablet 06/18/2020 take 1 tablet (20 mg) by oral route once daily at bedtime for 90 days   Prolia 60 mg/mL subcutaneous syringe 06/22/2020 inject 1 milliliter (60 mg) by subcutaneous route every 6 months in the upper arm, upper thigh or abdomen   Syringe 3cc/22Gx3/4\" 3 mL 22 gauge x 3/4\" miscellaneous syringe 06/18/2020 use as directed once monthly IM injection for 90 days   tramadol 50 mg oral tablet 06/18/2020 take 1 tablet by oral route 3 times a day for 30 days   triamcinolone acetonide 0.1 % topical cream 08/29/2019 apply to affected area(s) by topical route 3 times a day as needed for 10 days itching   valsartan-hydrochlorothiazide 160-25 mg oral tablet 06/18/2020 take 1 tablet by oral route once daily for 90 days   Vitamin D2 50,000 unit oral capsule 03/28/2019 take 1 capsule (50,000 unit) by oral route once weekly for 90 days   Voltaren 1 % topical gel 06/18/2020 apply to affected area(s) by topical route 4 times a day for 90 days         Allergy List  Allergen Name Date Reaction Notes   Boniva --  racing heart --    Contrast media allergy --  --  --    PENICILLINS --  --  --        Allergies Reconciled  Family Medical History  Disease Name Relative/Age Notes   Family History of Colon Cancer Brother/  Father/   Father; Brother   Arthrtis Father/   Father "         Social History  Finding Status Start/Stop Quantity Notes   Alcohol Current - status unknown --/-- --  15 or more drinks per week   Second hand smoke exposure Unknown --/-- --  no   Tobacco Former --/68 0.5 PPD former smoker, smokes less than 1 pack per day, for less than 5 years, never uses other tobacco products   Uses seatbelts --  --/-- --  yes         Immunizations  NameDate Admin Mfg Trade Name Lot Number Route Inj VIS Given VIS Publication   HepA09/08/2020 [employer] HAVRIX-ADULT  IM  09/08/2020    Comments: NDC [id number]   HepA10/10/2018 [employer] HAVRIX-ADULT [id number] IM LD 10/10/2018 07/20/2016   Comments: NDC 71443-651-30   Bxjgbcxlv51/30/2019 [facility] FLUZONE-HIGH DOSE [id number] IM LA 09/30/2019    Comments: NDC 11505-992-61   Wdyhakbaa47/10/2018 [facility] FLUZONE-HIGH DOSE [id number] IM LD 10/10/2018 08/07/2015   Comments: NDC 97628-809-77         Review of Systems  · Constitutional  o Denies  o : fatigue, fever, recent weight changes  · Eyes  o Denies  o : double vision, blurred vision  · HENT  o Denies  o : ear pain, sore throat, hoarseness, dysphagia, enlarged lymph nodes  · Breasts  o Denies  o : lumps, tenderness, swelling  · Cardiovascular  o Denies  o : chest discomfort, palpitations  · Respiratory  o Denies  o : cough, wheezing, shortness of breath  · Gastrointestinal  o Denies  o : abdominal pain, melena, nausea, vomiting  · Genitourinary  o Denies  o : dysuria  · Integument  o Denies  o : rash  · Neurologic  o Admits  o : tingling or numbness  o Denies  o : headaches, dizziness  · Musculoskeletal  o Admits  o : joint pain, knee pain  o Denies  o : joint swelling  · Endocrine  o Denies  o : polyuria, polydipsia, galactorrhea  · Psychiatric  o Denies  o : depression, suicidal ideation  · Heme-Lymph  o Denies  o : lightheadedness  · Allergic-Immunologic  o Denies  o : frequent illnesses      Vitals  Date Time BP Position Site L\R Cuff Size HR RR TEMP (F) WT  HT  BMI kg/m2 BSA m2 O2 Sat HC       09/08/2020 08:04 AM  "124/70 Sitting    77 - R  96.9 168lbs 2oz 5'  0.5\" 32.29 1.8 97 %          Physical Examination  · Constitutional  o Appearance  o : well-nourished, well developed, no obvious deformities present  · Eyes  o Conjunctivae  o : conjunctivae normal no drainage  o Pupils and Irises  o : pupils equal, round, and reactive to light bilaterally  · Neck  o Inspection/Palpation  o : supple  o Thyroid  o : no thyromegaly  · Respiratory  o Respiratory Effort  o : breathing unlabored  o Auscultation of Lungs  o : clear to ascultation  · Cardiovascular  o Heart  o :   § Auscultation of Heart  § : regular rate and rhythm  · Breasts  o FEMALE BREAST EXAM  o :   § Masses  § : no masses  § Nipple Discharge  § : no nipple discharge  § Breast symmetry  § : breast are symmetrical  · Musculoskeletal  o General  o :   § General Musculoskeletal  § : No joint swelling or deformity., Muscle tone, strength, and development grossly normal.  · Skin and Subcutaneous Tissue  o General Inspection  o : no lesions present, no areas of discoloration, skin turgor normal, texture normal  · Neurologic  o Mental Status Examination  o :   § Orientation  § : grossly oriented to person, place and time  o Gait and Station  o :   § Gait Screening  § : normal gait     walks with a cane and left knee very stiff               Assessment  · Screening for alcoholism     V79.1/Z13.89  · Screening for depression     V79.0/Z13.89  · Need for pneumococcal vaccination     V03.82/Z23  · Visit for screening mammogram     V76.12/Z12.31  · Encounter for Medicare annual wellness exam     V70.0/Z00.00  · Immunization counseling     V65.49/Z71.89  · Osteoarthritis     715.90/M19.90  · CKD (chronic kidney disease) stage 3, GFR 30-59 ml/min     585.3/N18.3  · Diabetic peripheral neuropathy       Type 2 diabetes mellitus with diabetic polyneuropathy     250.60/E11.42      Plan  · Orders  o Negative alcohol screening () - V79.1/Z13.89 - 09/08/2020  o ACO-18: Negative screen " for clinical depression using a standardized tool () - V79.0/Z13.89 - 09/08/2020  o Immunization Admin Fee (Single) ([facility]) (60531) - V03.82/Z23 - 09/08/2020  o Pneumococcal Vaccine, 23 valent, adult (89806) - V03.82/Z23 - 09/08/2020  o Screening Mammogram 2D Bilateral (50210, ) - V76.12/Z12.31 - 09/08/2020   do in [place] please  o Falls Risk Assessment Completed (3288F) - V70.0/Z00.00 - 09/08/2020  o Brief hearing screening (written) [facility] () - V70.0/Z00.00 - 09/08/2020  o Annual wellness visit; includes a personalized prevention plan of service (pps), initial visit () - V70.0/Z00.00 - 09/08/2020  o ACO-13: Fall Risk Screening with no falls in past year or only one fall without injury in the past year (1101F) - V70.0/Z00.00 - 09/08/2020  o Presence or absence of urinary incontinence assessed (EMILIE) (1090F) - V70.0/Z00.00 - 09/08/2020  o Immunization Admin Fee (Single) ([facility]) (80866) - V70.0/Z00.00, V65.49/Z71.89 - 09/08/2020  o ACO-39: Current medications updated and reviewed () - - 09/08/2020  o ACO-19: Colorectal cancer screening results documented and reviewed (3017F) - - 09/08/2020   cologuard 2018 (-)   o ACO-15: Pneumococcal Vaccine Administered or Previously Received (4040F) - - 09/08/2020  o Cologuard (, 68623) - - 09/08/2020   done 2018 and will be due 2021  o Havrix Adult Vaccine (02279) - V70.0/Z00.00, V65.49/Z71.89 - 09/08/2020  · Medications  o Medications have been Reconciled  o Transition of Care or Provider Policy  · Instructions  o Audit-C Questionnaire completed and scanned into the EMR under the designated folder within the patient's documents.  o Audit-C score of 0-4 - Negative Screen - Brief Discussion  o Depression Screen completed and scanned into the EMR under the designated folder within the patient's documents.  o PHQ-9 results between 1-4 indicate low-risk for Depression  o Today's PHQ-9 score is: _1__  o Health Risk Assessment has been reviewed with the  patient.  o Written health screening schedule for next 5-10 years was established with patient; information scanned in chart and given/mailed to patient.  o Fall prevention methods discussed and a copy of recommendations given/mailed to patient.  · Disposition  o Call or Return if symptoms worsen or persist.  o Return Visit Request in/on 1 year +/- 2 days (3277).     called and left messages for pt regarding the refills on the tramadol for the severe OA-and the lyrica for the neuropathy -if was needed now--BC appears to be due-and if so unfortunately will need to sign the new [employer] agreements--she an just stop in and do this --pt with CKD stage 3 and never shows s/s of misuse nor abuse and always takes meds as directed--her daughter ris an NP as well and really monitors her mothers meds and health closely and this years urine tox screen was done in January and was all normal and not due again until January    we've waited all day for the return call and pt hasn't called back as yet--so I will close out the note             Electronically Signed by: JHON Booth -Author on September 8, 2020 05:01:04 PM

## 2021-05-07 NOTE — PROGRESS NOTES
Progress Note      Patient Name: Nicole Holliday   Patient ID: [id number]   Sex: Female   YOB: 1940        Visit Date: October 10, 2018    Provider: JHON Diallo   Location: [facility]   Location Address: [address] [place] [address]  [id number]   Location Phone: [phone]          Chief Complaint     refills and labs, patient is fasting, also wants flu shot and Hep A shot       History Of Present Illness  Nicole Holliday is a 77 year old /White female who presents for evaluation and treatment of:      follow up on chronic health conditions & obtain refills of medications    HTN: stable - checks at home daily and averages about 130/70 - follows a diabetic diet, she is diet controlled - walks for physical activity and walks dog, loops her driveway - will occasionally get palpitations - denies CP or swelling of the ankles    Hyperlipidemia: denies muscle aches or cramps - eats fruit but tries to avoid high potassium foods (green stuff) due to Stage 3 CKD    Hypothyroidism: denies changes in weight, hair, skin, constipation - takes a daily stool softener    GERD: denies symptoms    DM: blood sugar was 102 this morning and is her average - dropped meter this morning and it busted    Neuropathy - takes Lyrica to help control her pain - takes as directed    Arthritis: tramadol helps to control her pain - takes medication twice daily and helps her to sleep at night     Immunizations: flu & Hep A:            Past Medical History  Disease Name Date Onset Notes   GERD (gastroesophageal reflux disease) --  --    Hyperlipidemia --  --    Hypertension, Benign Essential --  --    Hyperthyroidism --  --          Past Surgical History  Procedure Name Date Notes   Cholecstectomy --  --    Hysterectomy (due to cancer) --  --          Medication List  Name Date Started Instructions   Aspir-81 81 mg oral tablet,delayed release (DR/EC)  take 1 tablet (81 mg) by oral route  once daily   atorvastatin 20 mg oral tablet 04/02/2018 take 1 tablet (20 mg) by oral route once daily   clopidogrel 75 mg oral tablet 04/02/2018 take 1 tablet (75 mg) by oral route once daily   FreeStyle Lite Strips miscellaneous strip  check glucose 2 times a day   levothyroxine 50 mcg oral tablet 04/02/2018 take 1 tablet (50 mcg) by oral route once daily for 90 days   Lyrica 75 mg oral capsule 04/02/2018 take 1 capsule by oral route once a day (at bedtime) for 90 days   metoprolol succinate 50 mg oral tablet extended release 24 hr  take 1 tablet by oral route 2 times a day   nitroglycerin 0.4 mg sublingual tablet, sublingual  place 1 tablet (0.4 mg) by buccal route at the first sign of an attack; no more than 3 tabs are recommended within a 15 minute period.   pantoprazole 40 mg oral tablet,delayed release (DR/EC) 04/02/2018 take 1 tablet (40 mg) by oral route once daily for 90 days   tramadol 50 mg oral tablet 04/02/2018 take 1 tablet by oral route 2 times a day for 30 days   valsartan-hydrochlorothiazide 160-25 mg oral tablet 04/02/2018 take 1 tablet by oral route once daily   Voltaren 1 % topical gel  apply 2 gram to the affected area(s) by topical route 4 times per day         Allergy List  Allergen Name Date Reaction Notes   Contrast media allergy --  --  --    PENICILLINS --  --  --          Family Medical History  Disease Name Relative/Age Notes   Arthrtis / Father    Father/    Family History of Colon Cancer / Father; Brother    Brother/     Father/          Social History  Finding Status Start/Stop Quantity Notes   Alcohol Current - status unknown --/-- --  15 or more drinks per week   Second hand smoke exposure Unknown --/-- --  no   Tobacco Former --/68 0.5 PPD former smoker, smokes less than 1 pack per day, for less than 5 years, never uses other tobacco products   Uses seatbelts --  --/-- --  yes         Review of Systems  · Constitutional  o Denies  o : fatigue  · Cardiovascular  o Admits  o :  palpitations  o Denies  o : chest pain, swelling (feet, ankles, hands)  · Respiratory  o Denies  o : shortness of breath, wheezing, cough  · Gastrointestinal  o Denies  o : constipation, heartburn  · Endocrine  o Admits  o : thyroid disease, diabetes  o Denies  o : heat or cold intolerance, excessive thirst or urination  · Allergic-Immunologic  o Admits  o : sinus allergy symptoms      Vitals  Date Time BP Position Site L\R Cuff Size HR RR TEMP(F) WT  HT  BMI kg/m2 BSA m2 O2 Sat        10/10/2018 08:05 /62 Sitting    76 - R  96.9 165lbs 8oz    96 %           Physical Examination  · Constitutional  o Appearance  o : well developed, well-nourished, in no acute distress  · Eyes  o Conjunctivae  o : conjunctivae normal  o Pupils and Irises  o : pupils equal and round, pupils reactive to light bilaterally  · Neck  o Inspection/Palpation  o : supple  o Thyroid  o : no thyromegaly  · Respiratory  o Respiratory Effort  o : breathing unlabored  o Auscultation of Lungs  o : clear to ascultation  · Cardiovascular  o Heart  o :   § Auscultation of Heart  § : regular rate and rhythm  o Peripheral Vascular System  o :   § Extremities  § : no edema  · Lymphatic  o Neck  o : no lymphadenopathy present  · Musculoskeletal  o General  o :   § General Musculoskeletal  § : No joint swelling or deformity. Muscle tone, strength, and development grossly normal.  · Skin and Subcutaneous Tissue  o General Inspection  o : NL tone  · Neurologic  o Gait and Station  o :   § Gait Screening  § : normal gait  · Psychiatric  o Mood and Affect  o : mood normal, affect appropriate              Assessment  · Need for influenza vaccination     V04.81/Z23  · Diabetes mellitus, type 2     250.00/E11.9  · Essential hypertension     401.9/I10  · GERD (gastroesophageal reflux disease)     530.81/K21.9  · Hyperlipidemia     272.4/E78.5  · Hypothyroidism     244.9/E03.9      Plan  · Orders  o Fluzone High Dose Flu Vaccine (63223) - V04.81/Z23 -  10/10/2018   Vaccine - Influenza; Dose: 0.5; Site: Left Deltoid; Route: Intramuscular; Date: 10/10/2018 10:28:00; Exp: 03/30/2019; Lot: [id number]; Mfg: [employer] pasteur; TradeName: Fluzone High-Dose; Administered By: Stewart Lane MA; Comment: NDC 62176-716-44  o Hgb A1c [facility] (33230) - 250.00/E11.9 - 10/10/2018  o Urine microalbumin (22932) - 250.00/E11.9 - 10/10/2018  o CBC with Auto Diff [facility] (16464) - 401.9/I10 - 10/10/2018  o CMP [facility] (65564) - 401.9/I10 - 10/10/2018  o Lipid Panel [facility] (19115) - 272.4/E78.5 - 10/10/2018  o TSH [facility] (04063) - 244.9/E03.9 - 10/10/2018  o Urinalysis with Reflex Microscopy if abnormal ([facility]) (01300) - 250.00/E11.9 - 10/10/2018  o Immunization Admin Fee (2+ Injections) ([facility]) (07120) - - 10/10/2018  o ACO-14: Influenza immunization administered or previously received () - - 10/11/2018  o ACO-39: Current medications updated and reviewed () - - 10/10/2018  o Havrix Adult Vaccine (50134) - V04.81/Z23 - 10/10/2018   Vaccine - HepA; Dose: 1.0; Site: Left Deltoid; Route: Intramuscular; Date: 10/10/2018 10:26:00; Exp: 03/06/2021; Lot: [id number]; Mfg: [employer]; TradeName: Havrix Adult; Administered By: Stewart Lane MA; Comment: NDC 61035-724-75  · Medications  o FreeStyle System Kit miscellaneous kit   SIG: check blood glucose once daily   DISP: (1) Kit with 0 refills  Prescribed on 10/10/2018     o atorvastatin 20 mg oral tablet   SIG: take 1 tablet (20 mg) by oral route once daily for 90 days   DISP: (90) tablet with 1 refills  Adjusted on 10/10/2018     o clopidogrel 75 mg oral tablet   SIG: take 1 tablet (75 mg) by oral route once daily for 90 days   DISP: (90) tablet with 1 refills  Adjusted on 10/10/2018     o FreeStyle Lite Strips miscellaneous strip   SIG: check glucose 2 times a day   DISP: (1) 100 ct box with 2 refills  Adjusted on 10/10/2018     o levothyroxine 50 mcg oral tablet   SIG: take 1 tablet (50 mcg) by oral route once daily for 90 days   DISP: (90) tablet with 1  refills  Adjusted on 10/10/2018     o Lyrica 75 mg oral capsule   SIG: take 1 capsule by oral route once a day (at bedtime) for 90 days   DISP: (90) capsule with 0 refills  Adjusted on 10/10/2018     o pantoprazole 40 mg oral tablet,delayed release (DR/EC)   SIG: take 1 tablet (40 mg) by oral route once daily for 90 days   DISP: (90) tablet with 1 refills  Adjusted on 10/10/2018     o tramadol 50 mg oral tablet   SIG: take 1 tablet by oral route 2 times a day for 30 days   DISP: (60) tablet with 2 refills  Adjusted on 10/10/2018     o valsartan-hydrochlorothiazide 160-25 mg oral tablet   SIG: take 1 tablet by oral route once daily for 90 days   DISP: (90) tablet with 1 refills  Adjusted on 10/10/2018     · Instructions  o Continue blood sugar monitoring daily and record. Bring your log to office visits. Call the office for readings below 70 and above 250 or any complications.  o Daily foot care. Avoid walking barefoot. Annual Dilated Eye Exam.  o Patient advised to monitor blood pressure (B/P) at home and journal readings. Patient informed that a B/P reading at home of more than 135/85 is considered hypertension. For readings greater xysp288/90 or higher patient is advised to follow up in the office with readings for management. Patient advised to limit sodium intake.  o Maintain a healthy weight. Avoid tight fitting clothes. Avoid fried, fatty foods, tomato sauce, chocolate, mint, garlic, onion, alcohol. caffeine. Eat smaller meals, dont lie down after a meal, dont smoke. Elevate the head of your bed 6-9 inches.  o Take all medications as prescribed/directed.  o Patient was educated/instructed on their diagnosis, treatment and medications prior to discharge from the clinic today.  o Patient was instructed to exercise regularly.  o Patient given paper scripts today: Tramadol and Lyrica.  · Disposition  o Follow up as needed.            Electronically Signed by: JHON Diallo -Author on October 11, 2018  09:39:50 PM

## 2021-05-07 NOTE — PROGRESS NOTES
"   Progress Note      Patient Name: Nicole Holliday   Patient ID: [id number]   Sex: Female   YOB: 1940        Visit Date: February 15, 2021    Provider: JHON Booth   Location: [facility]   Location Address: [address] [place] [address] [place]  [id number]   Location Phone: [phone]          Chief Complaint     needs refills       History Of Present Illness  Video Conferencing Visit  Nicole Holliday is a 80 year old /White female who is presenting for evaluation via video conferencing via doximity was tries and had to do messenger video. Verbal consent obtained before beginning visit.   The following staff were present during this visit: myself   TELEHEALTH TELEPHONE VISIT  Chief Complaint: refills   Nicole Holliday is a 80 year old /White female who is presenting for evaluation via telehealth doximity was attempted and had to be changed to messenger video visit. Verbal consent obtained before beginning visit.   Provider spent 30 minutes with patient during telehealth visit.   The following staff were present during this visit: myself   Past Medical History/Overview of Patient Symptoms     call started at 8:34am   ended 9:04am     pt needing refills and also due for all fasting labs as last ones were in June 2020 and pt been very cautious regarding the pandemic     DM: stable 110-120 glucoses     OA: stable on the tramadol--\"fair\" and cannot take anything stronger--this keeps her mobile and active--    neuropathy: stable on the lyrica--    HTN: stable     GERD: stable     dyslipidemia: stable no SE     CAD: sees the cardiologist    hypothyroidism: not overly tired--and gaining wt--and need labs     taking Vit D 3000 OTC daily   _________________________________________________________________________________________    pt also reports took her 1st Moderna COVID 19 vaccine and due her 2nd one after the 21st         Review of " Systems  · Constitutional  o Admits  o : good general health lately  o Denies  o : fatigue, fever, recent weight changes   · HENT  o Denies  o : hearing loss or ringing, chronic sinus problem, swollen glands in neck  · Cardiovascular  o Denies  o : chest pain, palpitations (fast, fluttering, or skipping beats), swelling (feet, ankles, hands), shortness of breath while walking or lying flat  · Respiratory  o Denies  o : shortness of breath, asthma or wheezing, COPD  · Gastrointestinal  o Denies  o : ulcers, nausea or vomiting  · Genitourinary  o Denies  o : dysuria  · Integument  o Denies  o : rash  · Neurologic  o Admits  o : tingling or numbness  o Denies  o : lightheaded or dizzy, stroke, headaches  · Musculoskeletal  o Admits  o : joint pain, limitation of motion, hip pain, knee pain, reviewed and unchanged  o Denies  o : back pain, shoulder pain  · Endocrine  o Denies  o : thyroid disease, diabetes, heat or cold intolerance, excessive thirst or urination  · Psychiatric  o Denies  o : anxiety, depression, suicidal ideation, homicidal ideation  · Heme-Lymph  o Denies  o : bleeding or bruising tendency, anemia  · Allergic-Immunologic  o Denies  o : frequent illnesses      Physical Examination     PE:     A&OX3 slight [place]  answered all questions appropriately   good skin color  no pallor  smiling and making good eye contact   no breathlessness no cough noted during visit  no pressured speech           Assessment  · Diabetes mellitus, type 2     250.00/E11.9  · Essential hypertension     401.9/I10  · GERD (gastroesophageal reflux disease)     530.81/K21.9  · Hyperlipidemia     272.4/E78.5  · Hypothyroidism     244.9/E03.9  · Osteoarthritis     715.90/M19.90  · Vitamin D deficiency     268.9/E55.9  · CKD (chronic kidney disease)     585.9/N18.9  · Neuropathy     355.9/G62.9  · High risk medication use     V58.69/Z79.899      Plan  · Orders  o CMP [facility] (37606) - 250.00/E11.9 - 02/15/2021  o Hgb A1c [facility] (23858) -  250.00/E11.9 - 02/15/2021  o Lipid Panel [facility] (13226) - 250.00/E11.9 - 02/15/2021  o Urine microalbumin (00442) - 250.00/E11.9 - 02/15/2021  o CBC with Auto Diff [facility] (10528) - 401.9/I10, 250.00/E11.9, 272.4/E78.5, 585.9/N18.9 - 02/15/2021  o TSH [facility] (15448) - 244.9/E03.9 - 02/15/2021  o Vitamin D Level (36968) - 268.9/E55.9 - 02/15/2021  o ANASTASIYA Report (KASPR) - 585.9/N18.9, 715.90/M19.90 - 02/15/2021  o ACO-39: Current medications updated and reviewed (, 1159F) - - 02/15/2021  o Physician Telephone Evaluation, 21-30 minutes (76754) - - 02/15/2021  · Medications  o Enteric Coated Aspirin 81 mg oral tablet,delayed release (DR/EC)   SIG: take 1 tablet (81 mg) by oral route once daily for 90 days   DISP: (90) Tablet with 1 refills  Prescribed on 02/15/2021     o metoprolol succinate 50 mg oral tablet extended release 24 hr   SIG: take 1 tablet by oral route 2 times a day for 90 days   DISP: (180) Tablet with 1 refills  Prescribed on 02/15/2021     o clopidogrel 75 mg oral tablet   SIG: take 1 tablet (75 mg) by oral route once daily for 90 days   DISP: (90) Tablet with 1 refills  Adjusted on 02/15/2021     o cyanocobalamin (vitamin B-12) 1,000 mcg/mL injection solution   SIG: inject 1 milliliter IM once monthly for 90 days   DISP: (3) Bottle with 3 refills  Adjusted on 02/15/2021     o levothyroxine 75 mcg oral tablet   SIG: take 1 tablet (75 mcg) by oral route once daily on an empty stomach 30 minutes before breakfast for 90 days   DISP: (90) Tablet with 1 refills  Adjusted on 02/15/2021     o Lyrica 75 mg oral capsule   SIG: take 1 capsule by oral route once a day (at bedtime) for 90 days   DISP: (90) Capsule with 1 refills  Adjusted on 02/15/2021     o nitroglycerin 0.4 mg sublingual tablet, sublingual   SIG: place 1 tab by buccal route at the first sign of an attack no more than 3 tabs are recommended within a 15 minute period   DISP: (1) Bottle with 1 refills  Adjusted on 02/15/2021     o pantoprazole 40 mg  oral tablet,delayed release (DR/EC)   SIG: take 1 tablet (40 mg) by oral route once daily for 90 days   DISP: (90) Tablet with 1 refills  Adjusted on 02/15/2021     o pravastatin 20 mg oral tablet   SIG: take 1 tablet (20 mg) by oral route once daily at bedtime for 90 days   DISP: (90) Tablet with 1 refills  Adjusted on 02/15/2021     o Prolia 60 mg/mL subcutaneous syringe   SIG: inject 1 milliliter (60 mg) by subcutaneous route every 6 months in the upper arm, upper thigh or abdomen   DISP: (1) Pre-filled Pen Syringe with 1 refills  Adjusted on 02/15/2021     o tramadol 50 mg oral tablet   SIG: take 1 tablet by oral route 3 times a day for 30 days   DISP: (90) Tablet with 2 refills  Adjusted on 02/15/2021     o valsartan-hydrochlorothiazide 160-25 mg oral tablet   SIG: take 1 tablet by oral route once daily for 90 days   DISP: (90) Tablet with 1 refills  Adjusted on 02/15/2021     o Voltaren 1 % topical gel   SIG: apply to affected area(s) by topical route 4 times a day for 90 days   DISP: (3) Tube with 1 refills  Adjusted on 02/15/2021     o triamcinolone acetonide 0.1 % topical cream   SIG: apply to affected area(s) by topical route 3 times a day as needed for 10 days itching   DISP: (1) 15 gm tube with 0 refills  Discontinued on 02/15/2021     o Medications have been Reconciled  o Transition of Care or Provider Policy  · Instructions  o Continue blood sugar monitoring daily and record. Bring your log to office visits. Call the office for readings below 70 and above 250 or any complications.  o Daily foot care. Avoid walking barefoot. Annual Dilated Eye Exam.  o Discussed with patient blood pressure monitoring, hemoglobin A1C levels need to be below 7.0, and LDL (Lipid) goals below 70.  o Patient advised to monitor blood pressure (B/P) at home and journal readings. Patient informed that a B/P reading at home of more than 130/80 is considered hypertension. For readings greater ocoo326/90 or higher patient is  advised to follow up in the office with readings for management. Patient advised to limit sodium intake.  o Maintain a healthy weight. Avoid tight fitting clothes. Avoid fried, fatty foods, tomato sauce, chocolate, mint, garlic, onion, alcohol. caffeine. Eat smaller meals, dont lie down after a meal, dont smoke. Elevate the head of your bed 6-9 inches.  o Recommended exercise program to assist with cholesterol, weight loss and overall health improvement.  o Advised that cheeses and other sources of dairy fats, animal fats, fast food, and the extras (candy, pastries, pies, doughnuts and cookies) all contain LDL raising nutrients. Advised to increase fruits, vegetables, whole grains, and to monitor portion sizes.   o Tylenol may be used as needed every 4-6 hours for pain.  o Take a daily over the counter Vitamin D supplement.  o Plan Of Care: med mgt and CS use  o Chronic conditions reviewed and taken into consideration for today's treatment plan.  o Patient instructed to seek medical attention urgently for new or worsening symptoms.  o Patient was educated/instructed on their diagnosis, treatment and medications prior to discharge from the clinic today.  o Patient is taking medications as prescribed and doing well.   o Take all medications as prescribed/directed.  o Patient instructed/educated on their diet and exercise program.  o Call the office with any concerns or questions.  o Risks, benefits, and alternatives were discussed with the patient. The patient is aware of risks associated with:  o Discussed Covid-19 precautions including, but not limited to, social distancing, avoid touching your face, and hand washing.   · Disposition  o Call or Return if symptoms worsen or persist.  o Return Visit Request in/on 3 months +/- 2 days (3511).            Electronically Signed by: Brenda Son APRN -Author on February 15, 2021 09:17:43 AM

## 2021-05-07 NOTE — PROGRESS NOTES
Progress Note      Patient Name: Nicole Holliday   Patient ID: [id number]   Sex: Female   YOB: 1940        Visit Date: March 27, 2019    Provider: AIME Booth   Location: [facility]   Location Address: [address] [place] [address]  [id number]   Location Phone: [phone]          Chief Complaint     follow up on lab work       History Of Present Illness  Nicole Holliday is a 78 year old /White female who presents for evaluation and treatment of:      pt here for the F/U on the very low [id number]    then the pt had a UTI and treatment with Bactrim and just completed--    then the pt also had the elevated total protein as well     pt been trying to stay better hydrated    and daughter with her today and they want copies of all the labs--    the daughter is a NP and after the first week of the B12 shots she can start to give them to her thereafter       Past Medical History  Disease Name Date Onset Notes   GERD (gastroesophageal reflux disease) --  --    Hyperlipidemia --  --    Hypertension, Benign Essential --  --    Hyperthyroidism --  --          Past Surgical History  Procedure Name Date Notes   Cholecstectomy --  --    Hysterectomy (due to cancer) --  --          Medication List  Name Date Started Instructions   Aspir-81 81 mg oral tablet,delayed release (DR/EC)  take 1 tablet (81 mg) by oral route once daily   clopidogrel 75 mg oral tablet 03/14/2019 take 1 tablet (75 mg) by oral route once daily for 90 days   FreeStyle Lite Strips miscellaneous strip 03/14/2019 use as directed for 90 days check the glucose daily   FreeStyle System Kit miscellaneous kit 10/10/2018 check blood glucose once daily   Lancets,Ultra Thin miscellaneous misc 03/14/2019 use as directed for 90 days check glucse daily   levothyroxine 50 mcg oral tablet 03/14/2019 take 1 tablet (50 mcg) by oral route once daily for 90 days   Lyrica 75 mg oral capsule 03/14/2019 take 1  capsule by oral route once a day (at bedtime) for 90 days   metoprolol succinate 50 mg oral tablet extended release 24 hr  take 1 tablet by oral route 2 times a day   nitroglycerin 0.4 mg sublingual tablet, sublingual  place 1 tablet (0.4 mg) by buccal route at the first sign of an attack; no more than 3 tabs are recommended within a 15 minute period.   pantoprazole 40 mg oral tablet,delayed release (DR/EC) 03/14/2019 take 1 tablet (40 mg) by oral route once daily for 90 days   pravastatin 20 mg oral tablet 03/14/2019 take 1 tablet (20 mg) by oral route once daily at bedtime   tramadol 50 mg oral tablet 03/14/2019 take 1 tablet by oral route 2 times a day for 30 days   valsartan-hydrochlorothiazide 160-25 mg oral tablet 03/14/2019 take 1 tablet by oral route once daily   Voltaren 1 % topical gel 03/14/2019 apply 2 gram to the affected area(s) by topical route 4 times per day for 90 days         Allergy List  Allergen Name Date Reaction Notes   Contrast media allergy --  --  --    PENICILLINS --  --  --          Family Medical History  Disease Name Relative/Age Notes   Family History of Colon Cancer Brother/  Father/   Father; Brother   Arthrtis Father/   Father         Social History  Finding Status Start/Stop Quantity Notes   Alcohol Current - status unknown --/-- --  15 or more drinks per week   Second hand smoke exposure Unknown --/-- --  no   Tobacco Former --/68 0.5 PPD former smoker, smokes less than 1 pack per day, for less than 5 years, never uses other tobacco products   Uses seatbelts --  --/-- --  yes         Immunizations  NameDate Admin Mfg Trade Name Lot Number Route Inj VIS Given VIS Publication   HepA10/10/2018 SKB HAVRIX-ADULT [id number] IM LD 10/10/2018 07/20/2016   Comments: NDC 07172-709-10   Rxpdbcoes35/10/2018 PMC FLUZONE-HIGH DOSE [id number] IM LD 10/10/2018 08/07/2015   Comments: NDC 47286-588-72         Review of Systems  · Constitutional  o Admits  o : fatigue  o Denies  o :  fever  · HENT  o Denies  o : hearing loss or ringing, chronic sinus problem, swollen glands in neck  · Cardiovascular  o Denies  o : chest pain, palpitations (fast, fluttering, or skipping beats), swelling (feet, ankles, hands), shortness of breath while walking or lying flat  · Respiratory  o Denies  o : shortness of breath, asthma or wheezing, COPD  · Gastrointestinal  o Denies  o : nausea or vomiting  · Genitourinary  o Denies  o : urinary retention  · Integument  o Denies  o : rash  · Neurologic  o Denies  o : lightheaded or dizzy, headaches  · Musculoskeletal  o Admits  o : knee pain, ankle pain  o Denies  o : joint pain, back pain  · Endocrine  o Denies  o : thyroid disease, diabetes, heat or cold intolerance, excessive thirst or urination  · Heme-Lymph  o Denies  o : bleeding or bruising tendency, anemia  · Allergic-Immunologic  o Denies  o : frequent illnesses      Vitals  Date Time BP Position Site L\R Cuff Size HR RR TEMP (F) WT  HT  BMI kg/m2 BSA m2 O2 Sat        03/27/2019 10:02 /70 Sitting    84 - R  97.9 174lbs 16oz    94 %          Physical Examination  · Constitutional  o Appearance  o : well developed, well-nourished, in no acute distress  · Eyes  o Conjunctivae  o : conjunctivae normal  · Neck  o Inspection/Palpation  o : supple  o Thyroid  o : no thyromegaly  · Respiratory  o Respiratory Effort  o : breathing unlabored  o Auscultation of Lungs  o : clear to ascultation  · Cardiovascular  o Heart  o :   § Auscultation of Heart  § : regular rate and rhythm  o Peripheral Vascular System  o :   § Extremities  § : no edema  · Lymphatic  o Neck  o : no lymphadenopathy present  · Musculoskeletal  o General  o :   § General Musculoskeletal  § : No joint swelling or deformity., Muscle tone, strength, and development grossly normal.  · Skin and Subcutaneous Tissue  o General Inspection  o : skin turgor normal, texture normal  · Neurologic  o Gait and Station  o :   § Gait Screening  § : normal  "gait  · Psychiatric  o Mood and Affect  o : mood normal, affect appropriate              Assessment  · Vitamin B12 deficiency     266.2/E53.8  · Fatigue     780.79/R53.83  · Elevated total protein     790.99/R77.8  · Post-menopausal     V49.81/Z78.0  · Bone pain     733.90/M89.8X9  · Hypokalemia     276.8/E87.6  · UTI (urinary tract infection)     599.0/N39.0      Plan  · Orders  o Urine culture (14883, 09594) - 599.0/N39.0 - 03/27/2019   for F/U  o Vitamin D (25-Hydroxy) Level (72598) - V49.81/Z78.0, 733.90/M89.8X9, 780.79/R53.83 - 03/27/2019  o ACO-39: Current medications updated and reviewed () - - 03/27/2019  o CMP Non-fasting [facility] (21309) - 790.99/R77.8, 276.8/E87.6 - 03/27/2019  o Serum electrophoresis (35533) - 790.99/R77.8, 780.79/R53.83 - 03/27/2019  o Bone density scan (81231) - V49.81/Z78.0 - 03/27/2019   none in >2 yrs  · Medications  o cyanocobalamin (vitamin B-12) 1,000 mcg/mL injection solution   SIG: inject the 1 ml IM QD x 1 week, then weekly x 1 month then Q 2 weeks x 1 month then Q 3 weeks x 1 month then monthly thereafter   DISP: (1) 30 ml vial with 2 refills  Prescribed on 03/27/2019     o Syringe 3cc/22Gx3/4\" 3 mL 22 gauge x 3/4\" miscellaneous syringe   SIG: inject 1 ml IM daily x 1 week then weekly x 1 month then Q2 weeks x 1 month then Q 3 weeks x 1 month then monthly thereafter   DISP: (1) 100 ct box with 3 refills  Prescribed on 03/27/2019     · Instructions  o Take all medications as prescribed/directed.  o Patient was educated/instructed on their diagnosis, treatment and medications prior to discharge from the clinic today.  o Patient instructed to seek medical attention urgently for new or worsening symptoms.  o Call the office with any concerns or questions.  o Chronic conditions reviewed and taken into consideration for today's treatment plan.  · Disposition  o Call or Return if symptoms worsen or persist.            Electronically Signed by: AIME Booth -Author on " March 27, 2019 07:22:52 PM

## 2021-05-09 VITALS
OXYGEN SATURATION: 96 % | HEART RATE: 76 BPM | DIASTOLIC BLOOD PRESSURE: 62 MMHG | SYSTOLIC BLOOD PRESSURE: 124 MMHG | WEIGHT: 165.5 LBS | TEMPERATURE: 96.9 F

## 2021-05-09 VITALS
HEART RATE: 82 BPM | SYSTOLIC BLOOD PRESSURE: 126 MMHG | DIASTOLIC BLOOD PRESSURE: 70 MMHG | OXYGEN SATURATION: 100 % | TEMPERATURE: 97.5 F | WEIGHT: 161.25 LBS

## 2021-05-09 VITALS
DIASTOLIC BLOOD PRESSURE: 72 MMHG | BODY MASS INDEX: 32.18 KG/M2 | TEMPERATURE: 96.9 F | OXYGEN SATURATION: 98 % | HEIGHT: 61 IN | WEIGHT: 170.44 LBS | HEART RATE: 88 BPM | SYSTOLIC BLOOD PRESSURE: 128 MMHG

## 2021-05-09 VITALS
BODY MASS INDEX: 33.01 KG/M2 | WEIGHT: 168.12 LBS | TEMPERATURE: 96.9 F | HEART RATE: 77 BPM | HEIGHT: 60 IN | DIASTOLIC BLOOD PRESSURE: 70 MMHG | OXYGEN SATURATION: 97 % | SYSTOLIC BLOOD PRESSURE: 124 MMHG

## 2021-05-09 VITALS
WEIGHT: 169.56 LBS | OXYGEN SATURATION: 98 % | BODY MASS INDEX: 32.01 KG/M2 | TEMPERATURE: 98 F | DIASTOLIC BLOOD PRESSURE: 72 MMHG | SYSTOLIC BLOOD PRESSURE: 138 MMHG | HEART RATE: 78 BPM | HEIGHT: 61 IN

## 2021-05-09 VITALS
TEMPERATURE: 97.2 F | SYSTOLIC BLOOD PRESSURE: 130 MMHG | OXYGEN SATURATION: 97 % | DIASTOLIC BLOOD PRESSURE: 80 MMHG | HEART RATE: 74 BPM | WEIGHT: 169 LBS

## 2021-05-09 VITALS
WEIGHT: 175 LBS | HEART RATE: 84 BPM | OXYGEN SATURATION: 94 % | TEMPERATURE: 97.9 F | SYSTOLIC BLOOD PRESSURE: 130 MMHG | DIASTOLIC BLOOD PRESSURE: 70 MMHG

## 2021-05-09 VITALS
TEMPERATURE: 97.9 F | DIASTOLIC BLOOD PRESSURE: 68 MMHG | OXYGEN SATURATION: 96 % | HEART RATE: 79 BPM | WEIGHT: 168.5 LBS | SYSTOLIC BLOOD PRESSURE: 128 MMHG

## 2021-08-19 ENCOUNTER — TELEPHONE (OUTPATIENT)
Dept: FAMILY MEDICINE CLINIC | Facility: CLINIC | Age: 81
End: 2021-08-19

## 2021-08-19 DIAGNOSIS — Z12.31 SCREENING MAMMOGRAM, ENCOUNTER FOR: Primary | ICD-10-CM

## 2021-08-23 RX ORDER — TRAMADOL HYDROCHLORIDE 50 MG/1
TABLET ORAL
COMMUNITY
End: 2021-08-24 | Stop reason: SDUPTHER

## 2021-08-23 RX ORDER — LEVOTHYROXINE SODIUM 0.03 MG/1
TABLET ORAL
COMMUNITY
End: 2021-08-24 | Stop reason: SDUPTHER

## 2021-08-23 RX ORDER — OMEPRAZOLE 10 MG/1
CAPSULE, DELAYED RELEASE ORAL
COMMUNITY
End: 2021-08-24

## 2021-08-23 RX ORDER — PEG-3350, SODIUM SULFATE, SODIUM CHLORIDE, POTASSIUM CHLORIDE, SODIUM ASCORBATE AND ASCORBIC ACID 7.5-2.691G
KIT ORAL
COMMUNITY
End: 2021-08-24

## 2021-08-23 RX ORDER — ASPIRIN 81 MG/1
TABLET ORAL
COMMUNITY

## 2021-08-23 RX ORDER — PREGABALIN 100 MG/1
CAPSULE ORAL
COMMUNITY
End: 2021-08-24 | Stop reason: SDUPTHER

## 2021-08-24 ENCOUNTER — OFFICE VISIT (OUTPATIENT)
Dept: FAMILY MEDICINE CLINIC | Facility: CLINIC | Age: 81
End: 2021-08-24

## 2021-08-24 VITALS
TEMPERATURE: 96.4 F | OXYGEN SATURATION: 94 % | SYSTOLIC BLOOD PRESSURE: 128 MMHG | BODY MASS INDEX: 33.42 KG/M2 | HEIGHT: 61 IN | DIASTOLIC BLOOD PRESSURE: 64 MMHG | HEART RATE: 76 BPM | WEIGHT: 177 LBS

## 2021-08-24 DIAGNOSIS — M13.0 POLYARTHROPATHY: ICD-10-CM

## 2021-08-24 DIAGNOSIS — N18.32 STAGE 3B CHRONIC KIDNEY DISEASE (HCC): ICD-10-CM

## 2021-08-24 DIAGNOSIS — K21.9 GASTROESOPHAGEAL REFLUX DISEASE WITHOUT ESOPHAGITIS: ICD-10-CM

## 2021-08-24 DIAGNOSIS — E78.2 MIXED HYPERLIPIDEMIA: ICD-10-CM

## 2021-08-24 DIAGNOSIS — I10 BENIGN ESSENTIAL HYPERTENSION: Primary | ICD-10-CM

## 2021-08-24 DIAGNOSIS — G62.9 PERIPHERAL NERVE DISEASE: ICD-10-CM

## 2021-08-24 DIAGNOSIS — M10.9 GOUT OF FOOT, UNSPECIFIED CAUSE, UNSPECIFIED CHRONICITY, UNSPECIFIED LATERALITY: ICD-10-CM

## 2021-08-24 DIAGNOSIS — I42.9 CARDIOMYOPATHY, UNSPECIFIED TYPE (HCC): ICD-10-CM

## 2021-08-24 DIAGNOSIS — Z79.899 HIGH RISK MEDICATION USE: ICD-10-CM

## 2021-08-24 DIAGNOSIS — E03.9 HYPOTHYROIDISM, UNSPECIFIED TYPE: ICD-10-CM

## 2021-08-24 DIAGNOSIS — E11.9 TYPE 2 DIABETES MELLITUS WITHOUT COMPLICATION, WITHOUT LONG-TERM CURRENT USE OF INSULIN (HCC): ICD-10-CM

## 2021-08-24 PROBLEM — E78.5 HYPERLIPIDEMIA: Status: ACTIVE | Noted: 2021-08-24

## 2021-08-24 PROBLEM — Z95.810 AUTOMATIC IMPLANTABLE CARDIAC DEFIBRILLATOR IN SITU: Status: ACTIVE | Noted: 2017-09-01

## 2021-08-24 PROBLEM — I25.10 ARTERIOSCLEROSIS OF CORONARY ARTERY: Status: ACTIVE | Noted: 2021-08-24

## 2021-08-24 LAB
ALBUMIN SERPL-MCNC: 4.4 G/DL (ref 3.5–5.2)
ALBUMIN/GLOB SERPL: 1.8 G/DL
ALP SERPL-CCNC: 100 U/L (ref 39–117)
ALT SERPL W P-5'-P-CCNC: 28 U/L (ref 1–33)
AMPHET+METHAMPHET UR QL: NEGATIVE
ANION GAP SERPL CALCULATED.3IONS-SCNC: 12.3 MMOL/L (ref 5–15)
AST SERPL-CCNC: 31 U/L (ref 1–32)
BACTERIA UR QL AUTO: ABNORMAL /HPF
BARBITURATES UR QL SCN: NEGATIVE
BASOPHILS # BLD AUTO: 0.06 10*3/MM3 (ref 0–0.2)
BASOPHILS NFR BLD AUTO: 0.6 % (ref 0–1.5)
BENZODIAZ UR QL SCN: NEGATIVE
BILIRUB SERPL-MCNC: 0.8 MG/DL (ref 0–1.2)
BILIRUB UR QL STRIP: NEGATIVE
BUN SERPL-MCNC: 33 MG/DL (ref 8–23)
BUN/CREAT SERPL: 22.4 (ref 7–25)
CALCIUM SPEC-SCNC: 10.2 MG/DL (ref 8.6–10.5)
CANNABINOIDS SERPL QL: NEGATIVE
CHLORIDE SERPL-SCNC: 105 MMOL/L (ref 98–107)
CHOLEST SERPL-MCNC: 138 MG/DL (ref 0–200)
CLARITY UR: CLEAR
CO2 SERPL-SCNC: 24.7 MMOL/L (ref 22–29)
COCAINE UR QL: NEGATIVE
COLOR UR: YELLOW
CREAT SERPL-MCNC: 1.47 MG/DL (ref 0.57–1)
DEPRECATED RDW RBC AUTO: 41.9 FL (ref 37–54)
EOSINOPHIL # BLD AUTO: 0.43 10*3/MM3 (ref 0–0.4)
EOSINOPHIL NFR BLD AUTO: 4.6 % (ref 0.3–6.2)
ERYTHROCYTE [DISTWIDTH] IN BLOOD BY AUTOMATED COUNT: 12.7 % (ref 12.3–15.4)
GFR SERPL CREATININE-BSD FRML MDRD: 34 ML/MIN/1.73
GLOBULIN UR ELPH-MCNC: 2.5 GM/DL
GLUCOSE SERPL-MCNC: 99 MG/DL (ref 65–99)
GLUCOSE UR STRIP-MCNC: NEGATIVE MG/DL
HBA1C MFR BLD: 5.99 % (ref 4.8–5.6)
HCT VFR BLD AUTO: 39.4 % (ref 34–46.6)
HDLC SERPL-MCNC: 35 MG/DL (ref 40–60)
HGB BLD-MCNC: 13 G/DL (ref 12–15.9)
HGB UR QL STRIP.AUTO: NEGATIVE
HYALINE CASTS UR QL AUTO: ABNORMAL /LPF
KETONES UR QL STRIP: NEGATIVE
LDLC SERPL CALC-MCNC: 76 MG/DL (ref 0–100)
LDLC/HDLC SERPL: 2.07 {RATIO}
LEUKOCYTE ESTERASE UR QL STRIP.AUTO: ABNORMAL
LYMPHOCYTES # BLD AUTO: 2.27 10*3/MM3 (ref 0.7–3.1)
LYMPHOCYTES NFR BLD AUTO: 24.3 % (ref 19.6–45.3)
MCH RBC QN AUTO: 30.3 PG (ref 26.6–33)
MCHC RBC AUTO-ENTMCNC: 33 G/DL (ref 31.5–35.7)
MCV RBC AUTO: 91.8 FL (ref 79–97)
METHADONE UR QL SCN: NEGATIVE
MONOCYTES # BLD AUTO: 0.8 10*3/MM3 (ref 0.1–0.9)
MONOCYTES NFR BLD AUTO: 8.5 % (ref 5–12)
NEUTROPHILS NFR BLD AUTO: 5.77 10*3/MM3 (ref 1.7–7)
NEUTROPHILS NFR BLD AUTO: 61.7 % (ref 42.7–76)
NITRITE UR QL STRIP: NEGATIVE
OPIATES UR QL: NEGATIVE
OXYCODONE UR QL SCN: NEGATIVE
PH UR STRIP.AUTO: 7 [PH] (ref 5–8)
PLATELET # BLD AUTO: 244 10*3/MM3 (ref 140–450)
PMV BLD AUTO: 13 FL (ref 6–12)
POTASSIUM SERPL-SCNC: 4.2 MMOL/L (ref 3.5–5.2)
PROT SERPL-MCNC: 6.9 G/DL (ref 6–8.5)
PROT UR QL STRIP: NEGATIVE
RBC # BLD AUTO: 4.29 10*6/MM3 (ref 3.77–5.28)
RBC # UR: ABNORMAL /HPF
REF LAB TEST METHOD: ABNORMAL
SODIUM SERPL-SCNC: 142 MMOL/L (ref 136–145)
SP GR UR STRIP: 1.02 (ref 1–1.03)
SQUAMOUS #/AREA URNS HPF: ABNORMAL /HPF
TRIGL SERPL-MCNC: 153 MG/DL (ref 0–150)
TSH SERPL DL<=0.05 MIU/L-ACNC: 1.61 UIU/ML (ref 0.27–4.2)
URATE SERPL-MCNC: 6 MG/DL (ref 2.4–5.7)
UROBILINOGEN UR QL STRIP: ABNORMAL
VLDLC SERPL-MCNC: 27 MG/DL (ref 5–40)
WBC # BLD AUTO: 9.36 10*3/MM3 (ref 3.4–10.8)
WBC UR QL AUTO: ABNORMAL /HPF

## 2021-08-24 PROCEDURE — 80307 DRUG TEST PRSMV CHEM ANLYZR: CPT | Performed by: NURSE PRACTITIONER

## 2021-08-24 PROCEDURE — 84443 ASSAY THYROID STIM HORMONE: CPT | Performed by: NURSE PRACTITIONER

## 2021-08-24 PROCEDURE — 85025 COMPLETE CBC W/AUTO DIFF WBC: CPT | Performed by: NURSE PRACTITIONER

## 2021-08-24 PROCEDURE — 99214 OFFICE O/P EST MOD 30 MIN: CPT | Performed by: NURSE PRACTITIONER

## 2021-08-24 PROCEDURE — 83036 HEMOGLOBIN GLYCOSYLATED A1C: CPT | Performed by: NURSE PRACTITIONER

## 2021-08-24 PROCEDURE — 80061 LIPID PANEL: CPT | Performed by: NURSE PRACTITIONER

## 2021-08-24 PROCEDURE — 80053 COMPREHEN METABOLIC PANEL: CPT | Performed by: NURSE PRACTITIONER

## 2021-08-24 PROCEDURE — 84550 ASSAY OF BLOOD/URIC ACID: CPT | Performed by: NURSE PRACTITIONER

## 2021-08-24 PROCEDURE — 81001 URINALYSIS AUTO W/SCOPE: CPT | Performed by: NURSE PRACTITIONER

## 2021-08-24 PROCEDURE — 36415 COLL VENOUS BLD VENIPUNCTURE: CPT | Performed by: NURSE PRACTITIONER

## 2021-08-24 RX ORDER — TRAMADOL HYDROCHLORIDE 50 MG/1
50 TABLET ORAL EVERY 8 HOURS PRN
Qty: 90 TABLET | Refills: 2 | Status: SHIPPED | OUTPATIENT
Start: 2021-08-24 | End: 2022-01-20 | Stop reason: SDUPTHER

## 2021-08-24 RX ORDER — PREGABALIN 75 MG/1
75 CAPSULE ORAL NIGHTLY
Qty: 30 CAPSULE | Refills: 2 | Status: SHIPPED | OUTPATIENT
Start: 2021-08-24 | End: 2022-01-20 | Stop reason: SDUPTHER

## 2021-08-24 RX ORDER — CLOPIDOGREL BISULFATE 75 MG/1
1 TABLET ORAL DAILY
COMMUNITY
End: 2021-08-24 | Stop reason: SDUPTHER

## 2021-08-24 RX ORDER — PANTOPRAZOLE SODIUM 40 MG/1
40 TABLET, DELAYED RELEASE ORAL
COMMUNITY
End: 2021-08-24 | Stop reason: SDUPTHER

## 2021-08-24 RX ORDER — METOPROLOL SUCCINATE 25 MG/1
25 TABLET, EXTENDED RELEASE ORAL
COMMUNITY
End: 2021-08-24 | Stop reason: SDUPTHER

## 2021-08-24 RX ORDER — ATORVASTATIN CALCIUM 40 MG/1
40 TABLET, FILM COATED ORAL DAILY
Qty: 90 TABLET | Refills: 1 | Status: SHIPPED | OUTPATIENT
Start: 2021-08-24 | End: 2022-01-20 | Stop reason: SDUPTHER

## 2021-08-24 RX ORDER — CLOPIDOGREL BISULFATE 75 MG/1
75 TABLET ORAL DAILY
Qty: 90 TABLET | Refills: 1 | Status: SHIPPED | OUTPATIENT
Start: 2021-08-24 | End: 2022-01-20 | Stop reason: SDUPTHER

## 2021-08-24 RX ORDER — FLUTICASONE PROPIONATE 50 MCG
1 SPRAY, SUSPENSION (ML) NASAL DAILY
COMMUNITY
End: 2021-08-24

## 2021-08-24 RX ORDER — VALSARTAN AND HYDROCHLOROTHIAZIDE 160; 25 MG/1; MG/1
1 TABLET ORAL DAILY
COMMUNITY
End: 2021-08-24 | Stop reason: SDUPTHER

## 2021-08-24 RX ORDER — LEVOTHYROXINE SODIUM 88 UG/1
88 TABLET ORAL
Qty: 90 TABLET | Refills: 1 | Status: SHIPPED | OUTPATIENT
Start: 2021-08-24 | End: 2022-01-20 | Stop reason: SDUPTHER

## 2021-08-24 RX ORDER — METOPROLOL SUCCINATE 50 MG/1
50 TABLET, EXTENDED RELEASE ORAL DAILY
Qty: 90 TABLET | Refills: 1 | Status: SHIPPED | OUTPATIENT
Start: 2021-08-24 | End: 2022-01-20 | Stop reason: SDUPTHER

## 2021-08-24 RX ORDER — NITROGLYCERIN 0.4 MG/1
TABLET SUBLINGUAL
COMMUNITY
End: 2021-08-24 | Stop reason: SDUPTHER

## 2021-08-24 RX ORDER — NITROGLYCERIN 0.4 MG/1
0.4 TABLET SUBLINGUAL
Qty: 25 TABLET | Refills: 1 | Status: SHIPPED | OUTPATIENT
Start: 2021-08-24 | End: 2022-01-20 | Stop reason: SDUPTHER

## 2021-08-24 RX ORDER — CETIRIZINE HYDROCHLORIDE 10 MG/1
10 TABLET ORAL DAILY
COMMUNITY
End: 2021-08-24

## 2021-08-24 RX ORDER — PANTOPRAZOLE SODIUM 40 MG/1
40 TABLET, DELAYED RELEASE ORAL DAILY
Qty: 90 TABLET | Refills: 1 | Status: SHIPPED | OUTPATIENT
Start: 2021-08-24 | End: 2022-01-20 | Stop reason: SDUPTHER

## 2021-08-24 RX ORDER — ATORVASTATIN CALCIUM 40 MG/1
1 TABLET, FILM COATED ORAL DAILY
COMMUNITY
End: 2021-08-24 | Stop reason: SDUPTHER

## 2021-08-24 RX ORDER — VALSARTAN AND HYDROCHLOROTHIAZIDE 160; 25 MG/1; MG/1
1 TABLET ORAL DAILY
Qty: 90 TABLET | Refills: 1 | Status: SHIPPED | OUTPATIENT
Start: 2021-08-24 | End: 2022-01-20

## 2021-08-24 NOTE — PROGRESS NOTES
Chief Complaint  Heart Problem (pt is here for medication refill)    Subjective            Nicole Holliday presents to [facility]  Pt here today for the med refills and will need the 2 CS printed and given as she mails these to [facility]--and all others are faxed there    HTN: stable no CP no SOB no palpitations     Dyslipidemia: Dr Luis wants her changed from the pravachol to the Lipitor ---and he gave the first Rx     GERD: stable and no SE no issues    Diet controlled DM: stable well-controlled does not have to take medication    Pt stopped the prolia and then pt could not afford this and then pills as well and thinks was on the meds total of 4-5 yrs     Pt also was Dxd with gout and on allopurinol--patient does not know the dose and reports that her daughter who is a nurse practitioner placed her on this when she was having a flare and she remains on this    Osteoarthritis joint pain--multiple joints: Patient unable to take any nonsteroidal anti-inflammatories as she has chronic kidney disease stage III which is stable; patient takes the tramadol and is very compliant with her follow-ups shows no signs and symptoms of misuse nor abuse-and reports/denies having any issues with the medications and no side effects    Peripheral neuropathy: Patient remains on the Lyrica for this tried to come off of this at one time cannot even stand for sheets to touch her legs or her feet they hurt so badly with burning-patient very stable on Lyrica has no side effects no issues no residual effects of drowsiness or sleepiness and no falls; shows no signs and symptoms of misuse nor abuse and no aberrant behaviors and very compliant with her follow-ups          PHQ-2 Total Score: 0  PHQ-9 Total Score: 0    Past Medical History:   Diagnosis Date   • Benign essential hypertension    • CKD (chronic kidney disease)    • GERD (gastroesophageal reflux disease)    • Gout    • HLD (hyperlipidemia)    •  Hypothyroidism        Allergies   Allergen Reactions   • Contrast Dye Unknown - High Severity     Category: IV Contrast Dyes;      • Penicillins Rash   • Ibandronic Acid Nausea Only and Unknown - High Severity        Past Surgical History:   Procedure Laterality Date   • CHOLECYSTECTOMY     • HYSTERECTOMY          Social History     Tobacco Use   • Smoking status: Former Smoker     Packs/day: 0.50     Years: 5.00     Pack years: 2.50     Types: Cigarettes   • Smokeless tobacco: Never Used   Vaping Use   • Vaping Use: Never used   Substance Use Topics   • Alcohol use: Never   • Drug use: Never       Family History   Problem Relation Age of Onset   • Arthritis Father    • Colon cancer Father    • Colon cancer Brother         Health Maintenance Due   Topic Date Due   • Pneumococcal Vaccine 65+ (1 of 2 - PPSV23) Never done   • TDAP/TD VACCINES (1 - Tdap) Never done   • ZOSTER VACCINE (1 of 2) Never done   • HEPATITIS C SCREENING  Never done   • ANNUAL WELLNESS VISIT  Never done   • HEMOGLOBIN A1C  08/22/2021        Current Outpatient Medications on File Prior to Visit   Medication Sig   • ALLOPURINOL PO Take  by mouth.   • aspirin (aspirin) 81 MG EC tablet Aspir-81 81 mg oral tablet,delayed release (DR/EC) take 1 tablet (81 mg) by oral route once daily   Suspended   • [DISCONTINUED] atorvastatin (LIPITOR) 40 MG tablet Take 1 tablet by mouth Daily.   • [DISCONTINUED] clopidogrel (PLAVIX) 75 MG tablet Take 1 tablet by mouth Daily.   • [DISCONTINUED] levothyroxine (SYNTHROID, LEVOTHROID) 25 MCG tablet    • [DISCONTINUED] metoprolol succinate XL (TOPROL-XL) 25 MG 24 hr tablet Take 25 mg by mouth.   • [DISCONTINUED] nitroglycerin (NITROSTAT) 0.4 MG SL tablet Place  under the tongue.   • [DISCONTINUED] pantoprazole (PROTONIX) 40 MG EC tablet Take 40 mg by mouth.   • [DISCONTINUED] pregabalin (Lyrica) 100 MG capsule    • [DISCONTINUED] traMADol (Ultram) 50 MG tablet Ultram 50 mg oral tablet take 1 tablet by oral route every 4  "hours   Active   • [DISCONTINUED] valsartan-hydrochlorothiazide (DIOVAN-HCT) 160-25 MG per tablet Take 1 tablet by mouth Daily.   • [DISCONTINUED] cetirizine (zyrTEC) 10 MG tablet Take 10 mg by mouth Daily.   • [DISCONTINUED] Diclofenac Sodium (VOLTAREN) 1 % gel gel Apply  topically to the appropriate area as directed 4 (Four) Times a Day.   • [DISCONTINUED] fluticasone (FLONASE) 50 MCG/ACT nasal spray 1 spray into the nostril(s) as directed by provider Daily.   • [DISCONTINUED] omeprazole (prilOSEC) 10 MG capsule    • [DISCONTINUED] PEG-KCl-NaCl-NaSulf-Na Asc-C (MoviPrep) 100 g reconstituted solution powder MoviPrep 100-7.5-2.691 gram oral powder in packet take as directed   Active     No current facility-administered medications on file prior to visit.       Immunization History   Administered Date(s) Administered   • Hepatitis A 10/10/2018, 09/08/2020   • Influenza, Unspecified 10/10/2018, 09/30/2019       [facility]  Review of Systems   Constitutional: Negative.    HENT: Negative.    Respiratory: Negative.  Negative for chest tightness and shortness of breath.    Cardiovascular: Negative.  Negative for chest pain, palpitations and leg swelling.   Gastrointestinal: Negative.  Positive for GERD. Negative for abdominal distention, abdominal pain, anal bleeding, blood in stool, constipation, diarrhea, nausea, rectal pain, vomiting and indigestion.   Endocrine: Negative.    Genitourinary: Negative.    Musculoskeletal: Positive for arthralgias, gait problem and myalgias.        Also neuropathy pain of feet    Allergic/Immunologic: Negative.    Neurological: Negative for dizziness, tremors, seizures, syncope, light-headedness, numbness and memory problem.   Hematological: Negative.    Psychiatric/Behavioral: Negative.         Objective     /64 (BP Location: Left arm, Patient Position: Sitting, Cuff Size: Adult)   Pulse 76   Temp 96.4 °F (35.8 °C) (Temporal)   Ht 154.9 cm (61\")   Wt 80.3 kg (177 lb)   SpO2 " 94%   BMI 33.44 kg/m²       Physical Exam  Vitals and nursing note reviewed.   Constitutional:       Appearance: Normal appearance.   HENT:      Head: Normocephalic.      Right Ear: External ear normal.      Left Ear: External ear normal.      Nose: Nose normal.      Mouth/Throat:      Mouth: Mucous membranes are moist.      Comments: wearingmask  Eyes:      Pupils: Pupils are equal, round, and reactive to light.   Cardiovascular:      Rate and Rhythm: Normal rate and regular rhythm.      Pulses:           Dorsalis pedis pulses are 2+ on the right side and 2+ on the left side.        Posterior tibial pulses are 2+ on the right side and 2+ on the left side.      Heart sounds: Normal heart sounds.   Pulmonary:      Effort: Pulmonary effort is normal.      Breath sounds: Normal breath sounds.   Abdominal:      General: Bowel sounds are normal.      Palpations: Abdomen is soft.   Musculoskeletal:      Cervical back: Normal range of motion and neck supple.      Right lower leg: Tenderness and bony tenderness present.      Left lower leg: Tenderness and bony tenderness present.      Right foot: Bunion present.      Left foot: Bunion present.      Comments: Uses a cane to walk--left knee tender slightly swelled in both knees very stiff with some limited range of motion noted   Feet:      Right foot:      Protective Sensation: 10 sites tested. 5 sites sensed.      Skin integrity: Dry skin present. No ulcer or blister.      Toenail Condition: Right toenails are normal.      Left foot:      Protective Sensation: 10 sites tested. 5 sites sensed.      Skin integrity: Dry skin present. No ulcer or blister.      Toenail Condition: Left toenails are normal.      Comments: (+) swelling of the second toe at the joint and then the  sensation of the Bilat feet      Skin:     General: Skin is warm and dry.   Neurological:      Mental Status: She is alert and oriented to person, place, and time.   Psychiatric:         Mood and  Affect: Mood normal.         Behavior: Behavior normal.         Judgment: Judgment normal.         Result Review :     The following data was reviewed by: JHON Humphreys on 08/24/2021:      Comprehensive Metabolic Panel (02/22/2021 08:38)  Hemoglobin A1c (02/22/2021 08:38)  Estimated Average Glucose (02/22/2021 08:38)  Lipid Panel (02/22/2021 08:38)  Vitamin D 25 Hydroxy (02/22/2021 08:38)  TSH (02/22/2021 08:38)  Microalbumin / Creatinine Urine Ratio - (02/22/2021 08:38)  CBC & Differential (02/22/2021 08:38)    SCANNED - ECHOCARDIOGRAM (06/10/2021)               Assessment and Plan      Diagnoses and all orders for this visit:    1. Benign essential hypertension (Primary)  -     metoprolol succinate XL (TOPROL-XL) 50 MG 24 hr tablet; Take 1 tablet by mouth Daily.  Dispense: 90 tablet; Refill: 1  -     valsartan-hydrochlorothiazide (DIOVAN-HCT) 160-25 MG per tablet; Take 1 tablet by mouth Daily.  Dispense: 90 tablet; Refill: 1  -     CBC & Differential  -     Comprehensive Metabolic Panel  -     Lipid Panel  -     TSH Rfx On Abnormal To Free T4  -     Urinalysis With Culture If Indicated - Urine, Clean Catch    2. Type 2 diabetes mellitus without complication, without long-term current use of insulin (CMS/HCC)  -     Comprehensive Metabolic Panel  -     Lipid Panel  -     TSH Rfx On Abnormal To Free T4  -     Hemoglobin A1c    3. Cardiomyopathy, unspecified type (CMS/HCC)  -     clopidogrel (PLAVIX) 75 MG tablet; Take 1 tablet by mouth Daily.  Dispense: 90 tablet; Refill: 1  -     metoprolol succinate XL (TOPROL-XL) 50 MG 24 hr tablet; Take 1 tablet by mouth Daily.  Dispense: 90 tablet; Refill: 1  -     nitroglycerin (NITROSTAT) 0.4 MG SL tablet; Take 1 tablet by mouth Every 5 (Five) Minutes As Needed for Chest Pain.  Dispense: 25 tablet; Refill: 1  -     Comprehensive Metabolic Panel  -     Lipid Panel    4. Mixed hyperlipidemia  -     atorvastatin (LIPITOR) 40 MG tablet; Take 1 tablet by mouth Daily.   Dispense: 90 tablet; Refill: 1  -     Lipid Panel    5. Hypothyroidism, unspecified type  -     levothyroxine (SYNTHROID, LEVOTHROID) 88 MCG tablet; Take 1 tablet by mouth Every Morning.  Dispense: 90 tablet; Refill: 1  -     TSH Rfx On Abnormal To Free T4    6. Gastroesophageal reflux disease without esophagitis  -     pantoprazole (PROTONIX) 40 MG EC tablet; Take 1 tablet by mouth Daily.  Dispense: 90 tablet; Refill: 1    7. Stage 3b chronic kidney disease (CMS/HCC)  -     pregabalin (Lyrica) 75 MG capsule; Take 1 capsule by mouth Every Night.  Dispense: 30 capsule; Refill: 2  -     traMADol (Ultram) 50 MG tablet; Take 1 tablet by mouth Every 8 (Eight) Hours As Needed for Moderate Pain .  Dispense: 90 tablet; Refill: 2  -     Comprehensive Metabolic Panel    8. Peripheral nerve disease  -     pregabalin (Lyrica) 75 MG capsule; Take 1 capsule by mouth Every Night.  Dispense: 30 capsule; Refill: 2  -     traMADol (Ultram) 50 MG tablet; Take 1 tablet by mouth Every 8 (Eight) Hours As Needed for Moderate Pain .  Dispense: 90 tablet; Refill: 2  -     Urine Drug Screen - Urine, Clean Catch    9. Polyarthropathy  -     traMADol (Ultram) 50 MG tablet; Take 1 tablet by mouth Every 8 (Eight) Hours As Needed for Moderate Pain .  Dispense: 90 tablet; Refill: 2  -     Urine Drug Screen - Urine, Clean Catch    10. Gout of foot, unspecified cause, unspecified chronicity, unspecified laterality  -     Uric Acid    11. High risk medication use  -     Urine Drug Screen - Urine, Clean Catch    Diet controlled diabetic    Very compliant with her follow-ups        Follow Up     Return in about 3 months (around 11/24/2021), or if symptoms worsen or fail to improve.

## 2021-08-24 NOTE — PROGRESS NOTES
Venipuncture Blood Specimen Collection  Venipuncture performed in left arm  by Lupe White with good hemostasis. Patient tolerated the procedure well without complications.   08/24/21   Lupe White

## 2021-08-25 NOTE — PROGRESS NOTES
Please mail letter to patient stating    Ms. Holliday, the thyroid levels were in normal range; the comprehensive panel shows normal glucose normal electrolytes normal liver function tests and your kidney tests show that you were dehydrated with a BUN of 33 and it should be 23 or less your creatinine was about the same as it was last time at 1.47 which is elevated and still places you at chronic kidney disease stage III    The cholesterol levels show triglycerides at 153 and they should be less than 150 the HDL was low at 35 and for postmenopausal women it should be 50 or greater the LDL was in normal range    A year ago the uric acid level was at 9.3 which was elevated when you were most likely having that flareup that you told me about in the office-and today it was 6 so it still slightly elevated but you reported to me that you are already taking allopurinol I am not sure about the dosing but I just wanted you to know that the range for this laboratory for a normal uric acid level is 5.7 or less    The urinalysis shows negative for glucose ketones bilirubin blood or nitrates there was small 1+ leukocytes and an 2.0 on the urobilinogen should only be 1.0  And the microscopic urinalysis shows trace bacteria and 3-5 on the white blood cell count and then your yearly urine tox screen was completely negative    Your hemoglobin A1c was 5.99% which is excellent still in the very low prediabetic range    Your blood counts show normal white blood cell red blood cell hemoglobin hematocrit and platelet counts

## 2021-09-16 ENCOUNTER — HOSPITAL ENCOUNTER (OUTPATIENT)
Dept: MAMMOGRAPHY | Facility: HOSPITAL | Age: 81
Discharge: HOME | End: 2021-09-16
Admitting: NURSE PRACTITIONER

## 2021-09-16 DIAGNOSIS — Z12.31 SCREENING MAMMOGRAM, ENCOUNTER FOR: ICD-10-CM

## 2021-09-16 PROCEDURE — 77067 SCR MAMMO BI INCL CAD: CPT

## 2021-09-16 NOTE — PROGRESS NOTES
Please mail letter to patient stating    Ms. Holliday, the radiologist read your mammogram as benign and to continue doing your routine mammographic screenings yearly-and I would advise you to continue doing your self breast exams monthly

## 2021-10-13 ENCOUNTER — TRANSCRIBE ORDERS (OUTPATIENT)
Dept: ADMINISTRATIVE | Facility: HOSPITAL | Age: 81
End: 2021-10-13

## 2021-10-13 ENCOUNTER — LAB (OUTPATIENT)
Dept: LAB | Facility: HOSPITAL | Age: 81
End: 2021-10-13

## 2021-10-13 DIAGNOSIS — I50.43 CHF (CONGESTIVE HEART FAILURE), NYHA CLASS I, ACUTE ON CHRONIC, COMBINED (HCC): Primary | ICD-10-CM

## 2021-10-13 DIAGNOSIS — I50.43 CHF (CONGESTIVE HEART FAILURE), NYHA CLASS I, ACUTE ON CHRONIC, COMBINED (HCC): ICD-10-CM

## 2021-10-13 LAB — NT-PROBNP SERPL-MCNC: 1145 PG/ML (ref 0–1800)

## 2021-10-13 PROCEDURE — 83880 ASSAY OF NATRIURETIC PEPTIDE: CPT

## 2021-10-13 PROCEDURE — 36415 COLL VENOUS BLD VENIPUNCTURE: CPT

## 2021-10-18 ENCOUNTER — TRANSCRIBE ORDERS (OUTPATIENT)
Dept: ADMINISTRATIVE | Facility: HOSPITAL | Age: 81
End: 2021-10-18

## 2021-10-18 ENCOUNTER — LAB (OUTPATIENT)
Dept: LAB | Facility: HOSPITAL | Age: 81
End: 2021-10-18

## 2021-10-18 DIAGNOSIS — I50.43 CHF (CONGESTIVE HEART FAILURE), NYHA CLASS I, ACUTE ON CHRONIC, COMBINED (HCC): ICD-10-CM

## 2021-10-18 DIAGNOSIS — I50.43 CHF (CONGESTIVE HEART FAILURE), NYHA CLASS I, ACUTE ON CHRONIC, COMBINED (HCC): Primary | ICD-10-CM

## 2021-10-18 PROCEDURE — 36415 COLL VENOUS BLD VENIPUNCTURE: CPT

## 2021-10-18 PROCEDURE — 80048 BASIC METABOLIC PNL TOTAL CA: CPT

## 2021-10-19 LAB
ANION GAP SERPL CALCULATED.3IONS-SCNC: 8.3 MMOL/L (ref 5–15)
BUN SERPL-MCNC: 25 MG/DL (ref 8–23)
BUN/CREAT SERPL: 19.2 (ref 7–25)
CALCIUM SPEC-SCNC: 10.7 MG/DL (ref 8.6–10.5)
CHLORIDE SERPL-SCNC: 106 MMOL/L (ref 98–107)
CO2 SERPL-SCNC: 27.7 MMOL/L (ref 22–29)
CREAT SERPL-MCNC: 1.3 MG/DL (ref 0.57–1)
GFR SERPL CREATININE-BSD FRML MDRD: 40 ML/MIN/1.73
GLUCOSE SERPL-MCNC: 94 MG/DL (ref 65–99)
POTASSIUM SERPL-SCNC: 3.9 MMOL/L (ref 3.5–5.2)
SODIUM SERPL-SCNC: 142 MMOL/L (ref 136–145)

## 2021-11-04 ENCOUNTER — TRANSCRIBE ORDERS (OUTPATIENT)
Dept: ADMINISTRATIVE | Facility: HOSPITAL | Age: 81
End: 2021-11-04

## 2021-11-04 ENCOUNTER — LAB (OUTPATIENT)
Dept: LAB | Facility: HOSPITAL | Age: 81
End: 2021-11-04

## 2021-11-04 DIAGNOSIS — I50.43 CHF (CONGESTIVE HEART FAILURE), NYHA CLASS I, ACUTE ON CHRONIC, COMBINED (HCC): ICD-10-CM

## 2021-11-04 DIAGNOSIS — I50.43 CHF (CONGESTIVE HEART FAILURE), NYHA CLASS I, ACUTE ON CHRONIC, COMBINED (HCC): Primary | ICD-10-CM

## 2021-11-04 PROCEDURE — 36415 COLL VENOUS BLD VENIPUNCTURE: CPT

## 2021-11-04 PROCEDURE — 80048 BASIC METABOLIC PNL TOTAL CA: CPT

## 2021-11-05 LAB
ANION GAP SERPL CALCULATED.3IONS-SCNC: 8.2 MMOL/L (ref 5–15)
BUN SERPL-MCNC: 30 MG/DL (ref 8–23)
BUN/CREAT SERPL: 21.9 (ref 7–25)
CALCIUM SPEC-SCNC: 10.7 MG/DL (ref 8.6–10.5)
CHLORIDE SERPL-SCNC: 108 MMOL/L (ref 98–107)
CO2 SERPL-SCNC: 25.8 MMOL/L (ref 22–29)
CREAT SERPL-MCNC: 1.37 MG/DL (ref 0.57–1)
GFR SERPL CREATININE-BSD FRML MDRD: 37 ML/MIN/1.73
GLUCOSE SERPL-MCNC: 158 MG/DL (ref 65–99)
POTASSIUM SERPL-SCNC: 4.1 MMOL/L (ref 3.5–5.2)
SODIUM SERPL-SCNC: 142 MMOL/L (ref 136–145)

## 2022-01-20 ENCOUNTER — OFFICE VISIT (OUTPATIENT)
Dept: FAMILY MEDICINE CLINIC | Facility: CLINIC | Age: 82
End: 2022-01-20

## 2022-01-20 VITALS
BODY MASS INDEX: 33.25 KG/M2 | WEIGHT: 176 LBS | TEMPERATURE: 97.1 F | SYSTOLIC BLOOD PRESSURE: 112 MMHG | HEART RATE: 87 BPM | DIASTOLIC BLOOD PRESSURE: 68 MMHG | OXYGEN SATURATION: 96 %

## 2022-01-20 DIAGNOSIS — M1A.9XX0 CHRONIC GOUT WITHOUT TOPHUS, UNSPECIFIED CAUSE, UNSPECIFIED SITE: ICD-10-CM

## 2022-01-20 DIAGNOSIS — K21.9 GASTROESOPHAGEAL REFLUX DISEASE WITHOUT ESOPHAGITIS: ICD-10-CM

## 2022-01-20 DIAGNOSIS — G62.9 PERIPHERAL NERVE DISEASE: ICD-10-CM

## 2022-01-20 DIAGNOSIS — I10 BENIGN ESSENTIAL HYPERTENSION: Primary | ICD-10-CM

## 2022-01-20 DIAGNOSIS — N18.32 STAGE 3B CHRONIC KIDNEY DISEASE: ICD-10-CM

## 2022-01-20 DIAGNOSIS — M13.0 POLYARTHROPATHY: ICD-10-CM

## 2022-01-20 DIAGNOSIS — I42.9 CARDIOMYOPATHY, UNSPECIFIED TYPE: ICD-10-CM

## 2022-01-20 DIAGNOSIS — E11.9 COMPREHENSIVE DIABETIC FOOT EXAMINATION, TYPE 2 DM, ENCOUNTER FOR: ICD-10-CM

## 2022-01-20 DIAGNOSIS — E78.2 MIXED HYPERLIPIDEMIA: ICD-10-CM

## 2022-01-20 DIAGNOSIS — E03.9 HYPOTHYROIDISM, UNSPECIFIED TYPE: ICD-10-CM

## 2022-01-20 DIAGNOSIS — E11.9 DIET-CONTROLLED DIABETES MELLITUS: ICD-10-CM

## 2022-01-20 LAB
ALBUMIN SERPL-MCNC: 4.1 G/DL (ref 3.5–5.2)
ALBUMIN/GLOB SERPL: 1.1 G/DL
ALP SERPL-CCNC: 98 U/L (ref 39–117)
ALT SERPL W P-5'-P-CCNC: 26 U/L (ref 1–33)
ANION GAP SERPL CALCULATED.3IONS-SCNC: 11.2 MMOL/L (ref 5–15)
AST SERPL-CCNC: 33 U/L (ref 1–32)
BACTERIA UR QL AUTO: ABNORMAL /HPF
BASOPHILS # BLD AUTO: 0.05 10*3/MM3 (ref 0–0.2)
BASOPHILS NFR BLD AUTO: 0.6 % (ref 0–1.5)
BILIRUB SERPL-MCNC: 0.7 MG/DL (ref 0–1.2)
BILIRUB UR QL STRIP: NEGATIVE
BUN SERPL-MCNC: 29 MG/DL (ref 8–23)
BUN/CREAT SERPL: 20.6 (ref 7–25)
CALCIUM SPEC-SCNC: 10.1 MG/DL (ref 8.6–10.5)
CHLORIDE SERPL-SCNC: 105 MMOL/L (ref 98–107)
CHOLEST SERPL-MCNC: 133 MG/DL (ref 0–200)
CLARITY UR: CLEAR
CO2 SERPL-SCNC: 26.8 MMOL/L (ref 22–29)
COLOR UR: ABNORMAL
CREAT SERPL-MCNC: 1.41 MG/DL (ref 0.57–1)
DEPRECATED RDW RBC AUTO: 44.3 FL (ref 37–54)
EOSINOPHIL # BLD AUTO: 0.29 10*3/MM3 (ref 0–0.4)
EOSINOPHIL NFR BLD AUTO: 3.4 % (ref 0.3–6.2)
ERYTHROCYTE [DISTWIDTH] IN BLOOD BY AUTOMATED COUNT: 13 % (ref 12.3–15.4)
GFR SERPL CREATININE-BSD FRML MDRD: 36 ML/MIN/1.73
GLOBULIN UR ELPH-MCNC: 3.6 GM/DL
GLUCOSE SERPL-MCNC: 116 MG/DL (ref 65–99)
GLUCOSE UR STRIP-MCNC: NEGATIVE MG/DL
HBA1C MFR BLD: 6.65 % (ref 4.8–5.6)
HCT VFR BLD AUTO: 42 % (ref 34–46.6)
HDLC SERPL-MCNC: 37 MG/DL (ref 40–60)
HGB BLD-MCNC: 13.6 G/DL (ref 12–15.9)
HGB UR QL STRIP.AUTO: NEGATIVE
HYALINE CASTS UR QL AUTO: ABNORMAL /LPF
KETONES UR QL STRIP: NEGATIVE
LDLC SERPL CALC-MCNC: 74 MG/DL (ref 0–100)
LDLC/HDLC SERPL: 1.94 {RATIO}
LEUKOCYTE ESTERASE UR QL STRIP.AUTO: ABNORMAL
LYMPHOCYTES # BLD AUTO: 2.42 10*3/MM3 (ref 0.7–3.1)
LYMPHOCYTES NFR BLD AUTO: 28.5 % (ref 19.6–45.3)
MCH RBC QN AUTO: 30.4 PG (ref 26.6–33)
MCHC RBC AUTO-ENTMCNC: 32.4 G/DL (ref 31.5–35.7)
MCV RBC AUTO: 94 FL (ref 79–97)
MONOCYTES # BLD AUTO: 0.73 10*3/MM3 (ref 0.1–0.9)
MONOCYTES NFR BLD AUTO: 8.6 % (ref 5–12)
NEUTROPHILS NFR BLD AUTO: 4.99 10*3/MM3 (ref 1.7–7)
NEUTROPHILS NFR BLD AUTO: 58.8 % (ref 42.7–76)
NITRITE UR QL STRIP: NEGATIVE
PH UR STRIP.AUTO: 5.5 [PH] (ref 5–8)
PLATELET # BLD AUTO: 237 10*3/MM3 (ref 140–450)
PMV BLD AUTO: 13.1 FL (ref 6–12)
POTASSIUM SERPL-SCNC: 3.8 MMOL/L (ref 3.5–5.2)
PROT SERPL-MCNC: 7.7 G/DL (ref 6–8.5)
PROT UR QL STRIP: NEGATIVE
RBC # BLD AUTO: 4.47 10*6/MM3 (ref 3.77–5.28)
RBC # UR STRIP: ABNORMAL /HPF
REF LAB TEST METHOD: ABNORMAL
SODIUM SERPL-SCNC: 143 MMOL/L (ref 136–145)
SP GR UR STRIP: 1.02 (ref 1–1.03)
SQUAMOUS #/AREA URNS HPF: ABNORMAL /HPF
TRIGL SERPL-MCNC: 122 MG/DL (ref 0–150)
TSH SERPL DL<=0.05 MIU/L-ACNC: 2.63 UIU/ML (ref 0.27–4.2)
URATE SERPL-MCNC: 6.5 MG/DL (ref 2.4–5.7)
UROBILINOGEN UR QL STRIP: ABNORMAL
VLDLC SERPL-MCNC: 22 MG/DL (ref 5–40)
WBC # UR STRIP: ABNORMAL /HPF
WBC NRBC COR # BLD: 8.49 10*3/MM3 (ref 3.4–10.8)

## 2022-01-20 PROCEDURE — 81001 URINALYSIS AUTO W/SCOPE: CPT | Performed by: NURSE PRACTITIONER

## 2022-01-20 PROCEDURE — 80061 LIPID PANEL: CPT | Performed by: NURSE PRACTITIONER

## 2022-01-20 PROCEDURE — 83036 HEMOGLOBIN GLYCOSYLATED A1C: CPT | Performed by: NURSE PRACTITIONER

## 2022-01-20 PROCEDURE — 85025 COMPLETE CBC W/AUTO DIFF WBC: CPT | Performed by: NURSE PRACTITIONER

## 2022-01-20 PROCEDURE — 36415 COLL VENOUS BLD VENIPUNCTURE: CPT | Performed by: NURSE PRACTITIONER

## 2022-01-20 PROCEDURE — 84443 ASSAY THYROID STIM HORMONE: CPT | Performed by: NURSE PRACTITIONER

## 2022-01-20 PROCEDURE — 99214 OFFICE O/P EST MOD 30 MIN: CPT | Performed by: NURSE PRACTITIONER

## 2022-01-20 PROCEDURE — 80053 COMPREHEN METABOLIC PANEL: CPT | Performed by: NURSE PRACTITIONER

## 2022-01-20 PROCEDURE — 84550 ASSAY OF BLOOD/URIC ACID: CPT | Performed by: NURSE PRACTITIONER

## 2022-01-20 RX ORDER — ALLOPURINOL 100 MG/1
100 TABLET ORAL DAILY
Qty: 90 TABLET | Refills: 1 | Status: SHIPPED | OUTPATIENT
Start: 2022-01-20 | End: 2022-06-16 | Stop reason: SDUPTHER

## 2022-01-20 RX ORDER — LANCETS 28 GAUGE
1 EACH MISCELLANEOUS AS NEEDED
COMMUNITY
End: 2022-01-20 | Stop reason: SDUPTHER

## 2022-01-20 RX ORDER — ATORVASTATIN CALCIUM 40 MG/1
40 TABLET, FILM COATED ORAL DAILY
Qty: 90 TABLET | Refills: 1 | Status: SHIPPED | OUTPATIENT
Start: 2022-01-20 | End: 2022-06-16 | Stop reason: SDUPTHER

## 2022-01-20 RX ORDER — CLOPIDOGREL BISULFATE 75 MG/1
75 TABLET ORAL DAILY
Qty: 90 TABLET | Refills: 1 | Status: SHIPPED | OUTPATIENT
Start: 2022-01-20 | End: 2022-06-16 | Stop reason: SDUPTHER

## 2022-01-20 RX ORDER — HYDROCHLOROTHIAZIDE 12.5 MG/1
12.5 TABLET ORAL DAILY
COMMUNITY
Start: 2021-09-30 | End: 2022-01-20 | Stop reason: SDUPTHER

## 2022-01-20 RX ORDER — PANTOPRAZOLE SODIUM 40 MG/1
40 TABLET, DELAYED RELEASE ORAL DAILY
Qty: 90 TABLET | Refills: 1 | Status: SHIPPED | OUTPATIENT
Start: 2022-01-20 | End: 2022-06-16 | Stop reason: SDUPTHER

## 2022-01-20 RX ORDER — HYDROCHLOROTHIAZIDE 12.5 MG/1
12.5 TABLET ORAL DAILY
Qty: 90 TABLET | Refills: 1 | Status: SHIPPED | OUTPATIENT
Start: 2022-01-20 | End: 2022-06-16 | Stop reason: SDUPTHER

## 2022-01-20 RX ORDER — METOPROLOL SUCCINATE 50 MG/1
50 TABLET, EXTENDED RELEASE ORAL DAILY
Qty: 90 TABLET | Refills: 1 | Status: SHIPPED | OUTPATIENT
Start: 2022-01-20 | End: 2022-06-16 | Stop reason: SDUPTHER

## 2022-01-20 RX ORDER — LANCETS 28 GAUGE
1 EACH MISCELLANEOUS DAILY
Qty: 100 EACH | Refills: 3 | Status: SHIPPED | OUTPATIENT
Start: 2022-01-20 | End: 2022-12-08 | Stop reason: SDUPTHER

## 2022-01-20 RX ORDER — NITROGLYCERIN 0.4 MG/1
0.4 TABLET SUBLINGUAL
Qty: 25 TABLET | Refills: 1 | Status: SHIPPED | OUTPATIENT
Start: 2022-01-20 | End: 2022-06-16 | Stop reason: SDUPTHER

## 2022-01-20 RX ORDER — PREGABALIN 75 MG/1
75 CAPSULE ORAL NIGHTLY
Qty: 30 CAPSULE | Refills: 2 | Status: SHIPPED | OUTPATIENT
Start: 2022-01-20 | End: 2022-06-16 | Stop reason: SDUPTHER

## 2022-01-20 RX ORDER — LEVOTHYROXINE SODIUM 0.07 MG/1
75 TABLET ORAL
Qty: 90 TABLET | Refills: 1 | Status: SHIPPED | OUTPATIENT
Start: 2022-01-20 | End: 2022-06-16 | Stop reason: SDUPTHER

## 2022-01-20 RX ORDER — TRAMADOL HYDROCHLORIDE 50 MG/1
50 TABLET ORAL EVERY 8 HOURS PRN
Qty: 90 TABLET | Refills: 2 | Status: SHIPPED | OUTPATIENT
Start: 2022-01-20 | End: 2022-06-16 | Stop reason: SDUPTHER

## 2022-01-20 RX ORDER — BLOOD-GLUCOSE METER
KIT MISCELLANEOUS
COMMUNITY
End: 2022-09-28 | Stop reason: SDUPTHER

## 2022-01-20 NOTE — PROGRESS NOTES
Chief Complaint  Hypertension (refills and labs ) and Hyperlipidemia    Subjective            Ada FÁTIMA Holliday presents to [facility]  Pt here for the med refills and then the fasting labs--on the chronic co-morbid conditions:    --HTN: Stable no chest pain no shortness of breath and denies any syncopal episodes or falls    --HLD: Stable tolerates medication well no side effects no issues    --severe OA: Patient is on tramadol for this had history of the chronic kidney disease stage III unable to take nonsteroidal anti-inflammatories follows up and is very compliant with her follow-ups and the use of the medication shows no signs and symptoms of misuse nor abuse no aberrant behaviors    --severe neuropathy: She is on Lyrica for this at bedtime and this helps her be able to rest at night shows no signs and symptoms of misuse nor abuse no aberrant behaviors and takes the medication as directed    -- Diet-controlled diabetes: Patient is diet controlled and the A1c is normally in a very good range the patient does check her glucoses    --hypothyroidism: Stable no problems with fatigue or weight issues and takes her medication appropriately    -- GERD: No breakthrough symptoms on the medication    -- Gout: Since being started on the allopurinol no gout flareups although the second toe on the right foot is still swelled at the most distal joint    --CKD stage 3: Stable and we will monitor this with lab work              PHQ-2 Total Score:    PHQ-9 Total Score:      Past Medical History:   Diagnosis Date   • Benign essential hypertension    • CKD (chronic kidney disease)    • GERD (gastroesophageal reflux disease)    • Gout    • HLD (hyperlipidemia)    • Hypothyroidism        Allergies   Allergen Reactions   • Contrast Dye Unknown - High Severity     Category: IV Contrast Dyes;      • Penicillins Rash   • Ibandronic Acid Nausea Only and Unknown - High Severity        Past Surgical History:    Procedure Laterality Date   • CHOLECYSTECTOMY     • HYSTERECTOMY          Social History     Tobacco Use   • Smoking status: Former Smoker     Packs/day: 0.50     Years: 5.00     Pack years: 2.50     Types: Cigarettes   • Smokeless tobacco: Never Used   Vaping Use   • Vaping Use: Never used   Substance Use Topics   • Alcohol use: Never   • Drug use: Never       Family History   Problem Relation Age of Onset   • Arthritis Father    • Colon cancer Father    • Colon cancer Brother         Health Maintenance Due   Topic Date Due   • COVID-19 Vaccine (1) Never done   • Pneumococcal Vaccine 65+ (1 of 2 - PPSV23) Never done   • TDAP/TD VACCINES (1 - Tdap) Never done   • ZOSTER VACCINE (1 of 2) Never done   • HEPATITIS C SCREENING  Never done   • ANNUAL WELLNESS VISIT  09/08/2021   • DXA SCAN  01/14/2022        Current Outpatient Medications on File Prior to Visit   Medication Sig   • aspirin (aspirin) 81 MG EC tablet Aspir-81 81 mg oral tablet,delayed release (DR/EC) take 1 tablet (81 mg) by oral route once daily   Suspended   • Blood Glucose Monitoring Suppl (FreeStyle Lite) device    • sacubitril-valsartan (ENTRESTO) 24-26 MG tablet Take 1 tablet by mouth 2 (Two) Times a Day.   • [DISCONTINUED] ALLOPURINOL PO Take  by mouth.   • [DISCONTINUED] atorvastatin (LIPITOR) 40 MG tablet Take 1 tablet by mouth Daily.   • [DISCONTINUED] clopidogrel (PLAVIX) 75 MG tablet Take 1 tablet by mouth Daily.   • [DISCONTINUED] glucose blood test strip 1 each by Other route As Needed. Use as instructed   • [DISCONTINUED] hydroCHLOROthiazide (HYDRODIURIL) 12.5 MG tablet Take 12.5 mg by mouth Daily.   • [DISCONTINUED] Lancets (freestyle) lancets 1 each by Other route As Needed. Use as instructed   • [DISCONTINUED] levothyroxine (SYNTHROID, LEVOTHROID) 88 MCG tablet Take 1 tablet by mouth Every Morning. (Patient taking differently: Take 50 mcg by mouth Every Morning.)   • [DISCONTINUED] metoprolol succinate XL (TOPROL-XL) 50 MG 24 hr tablet  Take 1 tablet by mouth Daily.   • [DISCONTINUED] nitroglycerin (NITROSTAT) 0.4 MG SL tablet Take 1 tablet by mouth Every 5 (Five) Minutes As Needed for Chest Pain.   • [DISCONTINUED] pantoprazole (PROTONIX) 40 MG EC tablet Take 1 tablet by mouth Daily.   • [DISCONTINUED] pregabalin (Lyrica) 75 MG capsule Take 1 capsule by mouth Every Night.   • [DISCONTINUED] traMADol (Ultram) 50 MG tablet Take 1 tablet by mouth Every 8 (Eight) Hours As Needed for Moderate Pain .   • [DISCONTINUED] valsartan-hydrochlorothiazide (DIOVAN-HCT) 160-25 MG per tablet Take 1 tablet by mouth Daily.     No current facility-administered medications on file prior to visit.       Immunization History   Administered Date(s) Administered   • Hepatitis A 10/10/2018, 09/08/2020   • Influenza, Unspecified 10/10/2018, 09/30/2019       Review of Systems   Constitutional: Negative for fatigue, unexpected weight gain and unexpected weight loss.   HENT: Negative.    Eyes: Negative.    Respiratory: Negative for cough, shortness of breath and wheezing.    Cardiovascular: Negative for chest pain, palpitations and leg swelling.   Gastrointestinal: Negative.  Positive for GERD. Negative for abdominal pain and blood in stool.   Endocrine: Negative.    Genitourinary: Negative.  Negative for dysuria.   Musculoskeletal: Positive for arthralgias.   Skin: Negative.    Allergic/Immunologic: Negative.    Neurological: Negative for dizziness, tremors, seizures, syncope and light-headedness.   Hematological: Negative.    Psychiatric/Behavioral: Negative.         Objective     /68   Pulse 87   Temp 97.1 °F (36.2 °C)   Wt 79.8 kg (176 lb)   SpO2 96%   BMI 33.25 kg/m²       Physical Exam  Vitals and nursing note reviewed.   Constitutional:       Appearance: Normal appearance.   HENT:      Head: Normocephalic.      Right Ear: External ear normal.      Left Ear: External ear normal.      Nose: Nose normal.      Mouth/Throat:      Comments: Wearing mask  Eyes:       Pupils: Pupils are equal, round, and reactive to light.   Cardiovascular:      Rate and Rhythm: Normal rate and regular rhythm.      Pulses:           Dorsalis pedis pulses are 2+ on the right side and 2+ on the left side.        Posterior tibial pulses are 2+ on the right side and 2+ on the left side.      Heart sounds: Normal heart sounds.   Pulmonary:      Effort: Pulmonary effort is normal.      Breath sounds: Normal breath sounds.   Abdominal:      General: Bowel sounds are normal.      Palpations: Abdomen is soft.      Tenderness: There is no abdominal tenderness.   Musculoskeletal:      Cervical back: Normal range of motion and neck supple.      Right foot: Deformity present.      Comments: Swelled joints of the Knees and then the feet--and tops of the feet --uses the cane to walk    Feet:      Right foot:      Protective Sensation: 10 sites tested. 5 sites sensed.      Skin integrity: Dry skin present. No ulcer or blister.      Toenail Condition: Right toenails are normal.      Left foot:      Protective Sensation: 10 sites tested. 5 sites sensed.      Skin integrity: Dry skin present. No ulcer or blister.      Toenail Condition: Left toenails are normal.      Comments: Hammertoes and ganglion cyst to the top of the right foot and then the second toe with swelled gout joint --and decreased sensation to the L>R on the bottom      Skin:     General: Skin is warm and dry.   Neurological:      Mental Status: She is alert and oriented to person, place, and time.   Psychiatric:         Mood and Affect: Mood normal.         Behavior: Behavior normal.         Judgment: Judgment normal.         Result Review :           CBC & Differential (08/24/2021 10:03)  Comprehensive Metabolic Panel (08/24/2021 10:03)  Lipid Panel (08/24/2021 10:03)  TSH Rfx On Abnormal To Free T4 (08/24/2021 10:03)  Urinalysis With Culture If Indicated - Urine, Clean Catch (08/24/2021 10:03)  Hemoglobin A1c (08/24/2021 10:03)  Uric Acid  (08/24/2021 10:03)  Urine Drug Screen - Urine, Clean Catch (08/24/2021 10:03)  Urinalysis, Microscopic Only - Urine, Clean Catch (08/24/2021 10:03)                 Assessment and Plan      Diagnoses and all orders for this visit:    1. Benign essential hypertension (Primary)  -     hydroCHLOROthiazide (HYDRODIURIL) 12.5 MG tablet; Take 1 tablet by mouth Daily.  Dispense: 90 tablet; Refill: 1  -     levothyroxine (SYNTHROID, LEVOTHROID) 75 MCG tablet; Take 1 tablet by mouth Every Morning.  Dispense: 90 tablet; Refill: 1  -     metoprolol succinate XL (TOPROL-XL) 50 MG 24 hr tablet; Take 1 tablet by mouth Daily.  Dispense: 90 tablet; Refill: 1  -     CBC & Differential  -     Comprehensive Metabolic Panel  -     Lipid Panel  -     TSH Rfx On Abnormal To Free T4  -     Urinalysis With Culture If Indicated - Urine, Clean Catch    2. Diet-controlled diabetes mellitus (HCC)  -     CBC & Differential  -     Comprehensive Metabolic Panel  -     Lipid Panel  -     TSH Rfx On Abnormal To Free T4  -     Urinalysis With Culture If Indicated - Urine, Clean Catch  -     glucose blood test strip; 1 each by Other route Daily. Use as instructed  Dispense: 100 each; Refill: 4  -     Lancets (freestyle) lancets; 1 each by Other route Daily. Use as instructed  Dispense: 100 each; Refill: 3  -     Hemoglobin A1c    3. Mixed hyperlipidemia  -     atorvastatin (LIPITOR) 40 MG tablet; Take 1 tablet by mouth Daily.  Dispense: 90 tablet; Refill: 1  -     Comprehensive Metabolic Panel  -     Lipid Panel    4. Cardiomyopathy, unspecified type (HCC)  -     clopidogrel (PLAVIX) 75 MG tablet; Take 1 tablet by mouth Daily.  Dispense: 90 tablet; Refill: 1  -     metoprolol succinate XL (TOPROL-XL) 50 MG 24 hr tablet; Take 1 tablet by mouth Daily.  Dispense: 90 tablet; Refill: 1  -     nitroglycerin (NITROSTAT) 0.4 MG SL tablet; Take 1 tablet by mouth Every 5 (Five) Minutes As Needed for Chest Pain.  Dispense: 25 tablet; Refill: 1    5.  Hypothyroidism, unspecified type  -     levothyroxine (SYNTHROID, LEVOTHROID) 75 MCG tablet; Take 1 tablet by mouth Every Morning.  Dispense: 90 tablet; Refill: 1  -     TSH Rfx On Abnormal To Free T4    6. Gastroesophageal reflux disease without esophagitis  -     pantoprazole (PROTONIX) 40 MG EC tablet; Take 1 tablet by mouth Daily.  Dispense: 90 tablet; Refill: 1  -     CBC & Differential    7. Stage 3b chronic kidney disease (HCC)  -     pregabalin (Lyrica) 75 MG capsule; Take 1 capsule by mouth Every Night.  Dispense: 30 capsule; Refill: 2  -     traMADol (Ultram) 50 MG tablet; Take 1 tablet by mouth Every 8 (Eight) Hours As Needed for Moderate Pain .  Dispense: 90 tablet; Refill: 2  -     Comprehensive Metabolic Panel    8. Peripheral nerve disease  -     pregabalin (Lyrica) 75 MG capsule; Take 1 capsule by mouth Every Night.  Dispense: 30 capsule; Refill: 2  -     traMADol (Ultram) 50 MG tablet; Take 1 tablet by mouth Every 8 (Eight) Hours As Needed for Moderate Pain .  Dispense: 90 tablet; Refill: 2    9. Polyarthropathy  -     traMADol (Ultram) 50 MG tablet; Take 1 tablet by mouth Every 8 (Eight) Hours As Needed for Moderate Pain .  Dispense: 90 tablet; Refill: 2    10. Chronic gout without tophus, unspecified cause, unspecified site  -     allopurinol (ZYLOPRIM) 100 MG tablet; Take 1 tablet by mouth Daily.  Dispense: 90 tablet; Refill: 1  -     Uric Acid    11. Comprehensive diabetic foot examination, type 2 DM, encounter for ([facility])    Patient reports she is already received her pneumonia vaccines in the past declines those        Follow Up     Return in about 3 months (around 4/20/2022), or if symptoms worsen or fail to improve, for CS refills .

## 2022-01-20 NOTE — PROGRESS NOTES
Venipuncture Blood Specimen Collection  Venipuncture performed in left arm  by Lupe White with good hemostasis. Patient tolerated the procedure well without complications.   01/20/22   Lupe White

## 2022-01-24 NOTE — PROGRESS NOTES
Please mail letter to patient stating    Mrs. Holliday, the comprehensive panel shows your fasting glucose at 116 and your liver function tests were all normal range except the AST was just borderline elevated at 33 and it should be 32 or less; all of your electrolytes were normal and the renal function was stable chronic kidney disease stage III; your cholesterol panel shows everything was normal triglycerides were much better than they were the last time and the HDL is still slightly low at 37 and it should be 40 or higher but it is better than it was the last time and you can improve this with low-cholesterol diet and getting some exercise like walking 30 to 60 minutes daily    The uric acid level shows that approximately 6 months ago it was 6.0 and normal range is 5.7 and less and a year ago it was at 9.3 and this time is at 6.5 so it still a little elevated and if you are having any gout flares at all we should increase your allopurinol from 100 mg daily to 200 mg daily please let me know and I will send that new prescription in if needed; also the thyroid levels were normal range; your hemoglobin A1c the last 2 times were 5.9% and a year and a half ago it was 6.2% this time it is 6.65% is still where we want it to show a good control diabetic but it is in the diabetic range but the goal is to keep you less than 7%;     Urinalysis shows 1+ leukocytes and microscopically there was a small amount of white blood cells in the urine and normally when that is positive the lab automatically does a reflex urine culture and we will await and see what those results are; all of your blood counts were normal range;

## 2022-06-16 ENCOUNTER — OFFICE VISIT (OUTPATIENT)
Dept: FAMILY MEDICINE CLINIC | Facility: CLINIC | Age: 82
End: 2022-06-16

## 2022-06-16 VITALS
WEIGHT: 176 LBS | OXYGEN SATURATION: 96 % | DIASTOLIC BLOOD PRESSURE: 74 MMHG | TEMPERATURE: 96.9 F | SYSTOLIC BLOOD PRESSURE: 130 MMHG | HEIGHT: 61 IN | HEART RATE: 95 BPM | BODY MASS INDEX: 33.23 KG/M2

## 2022-06-16 DIAGNOSIS — G62.9 PERIPHERAL NERVE DISEASE: ICD-10-CM

## 2022-06-16 DIAGNOSIS — E03.9 HYPOTHYROIDISM, UNSPECIFIED TYPE: ICD-10-CM

## 2022-06-16 DIAGNOSIS — R73.03 PREDIABETES: ICD-10-CM

## 2022-06-16 DIAGNOSIS — N18.32 STAGE 3B CHRONIC KIDNEY DISEASE: ICD-10-CM

## 2022-06-16 DIAGNOSIS — I42.9 CARDIOMYOPATHY, UNSPECIFIED TYPE: ICD-10-CM

## 2022-06-16 DIAGNOSIS — M1A.9XX0 CHRONIC GOUT WITHOUT TOPHUS, UNSPECIFIED CAUSE, UNSPECIFIED SITE: ICD-10-CM

## 2022-06-16 DIAGNOSIS — M13.0 POLYARTHROPATHY: ICD-10-CM

## 2022-06-16 DIAGNOSIS — K21.9 GASTROESOPHAGEAL REFLUX DISEASE WITHOUT ESOPHAGITIS: ICD-10-CM

## 2022-06-16 DIAGNOSIS — E78.2 MIXED HYPERLIPIDEMIA: ICD-10-CM

## 2022-06-16 DIAGNOSIS — I10 BENIGN ESSENTIAL HYPERTENSION: Primary | ICD-10-CM

## 2022-06-16 DIAGNOSIS — Z79.899 HIGH RISK MEDICATION USE: ICD-10-CM

## 2022-06-16 LAB
ALBUMIN SERPL-MCNC: 4.2 G/DL (ref 3.5–5.2)
ALBUMIN UR-MCNC: 1.2 MG/DL
ALBUMIN/GLOB SERPL: 1.4 G/DL
ALP SERPL-CCNC: 90 U/L (ref 39–117)
ALT SERPL W P-5'-P-CCNC: 19 U/L (ref 1–33)
AMPHET+METHAMPHET UR QL: NEGATIVE
ANION GAP SERPL CALCULATED.3IONS-SCNC: 14.3 MMOL/L (ref 5–15)
AST SERPL-CCNC: 29 U/L (ref 1–32)
BACTERIA UR QL AUTO: ABNORMAL /HPF
BARBITURATES UR QL SCN: NEGATIVE
BASOPHILS # BLD AUTO: 0.05 10*3/MM3 (ref 0–0.2)
BASOPHILS NFR BLD AUTO: 0.6 % (ref 0–1.5)
BENZODIAZ UR QL SCN: NEGATIVE
BILIRUB SERPL-MCNC: 0.7 MG/DL (ref 0–1.2)
BILIRUB UR QL STRIP: NEGATIVE
BUN SERPL-MCNC: 30 MG/DL (ref 8–23)
BUN/CREAT SERPL: 21.4 (ref 7–25)
CALCIUM SPEC-SCNC: 10.1 MG/DL (ref 8.6–10.5)
CANNABINOIDS SERPL QL: NEGATIVE
CHLORIDE SERPL-SCNC: 103 MMOL/L (ref 98–107)
CHOLEST SERPL-MCNC: 121 MG/DL (ref 0–200)
CLARITY UR: CLEAR
CO2 SERPL-SCNC: 23.7 MMOL/L (ref 22–29)
COCAINE UR QL: NEGATIVE
COLOR UR: ABNORMAL
CREAT SERPL-MCNC: 1.4 MG/DL (ref 0.57–1)
DEPRECATED RDW RBC AUTO: 41.7 FL (ref 37–54)
EGFRCR SERPLBLD CKD-EPI 2021: 38.3 ML/MIN/1.73
EOSINOPHIL # BLD AUTO: 0.29 10*3/MM3 (ref 0–0.4)
EOSINOPHIL NFR BLD AUTO: 3.2 % (ref 0.3–6.2)
ERYTHROCYTE [DISTWIDTH] IN BLOOD BY AUTOMATED COUNT: 12.4 % (ref 12.3–15.4)
GLOBULIN UR ELPH-MCNC: 3.1 GM/DL
GLUCOSE SERPL-MCNC: 111 MG/DL (ref 65–99)
GLUCOSE UR STRIP-MCNC: NEGATIVE MG/DL
HBA1C MFR BLD: 6.1 % (ref 4.8–5.6)
HCT VFR BLD AUTO: 41.3 % (ref 34–46.6)
HDLC SERPL-MCNC: 38 MG/DL (ref 40–60)
HGB BLD-MCNC: 13.8 G/DL (ref 12–15.9)
HGB UR QL STRIP.AUTO: NEGATIVE
HYALINE CASTS UR QL AUTO: ABNORMAL /LPF
IMM GRANULOCYTES # BLD AUTO: 0.03 10*3/MM3 (ref 0–0.05)
IMM GRANULOCYTES NFR BLD AUTO: 0.3 % (ref 0–0.5)
KETONES UR QL STRIP: NEGATIVE
LDLC SERPL CALC-MCNC: 61 MG/DL (ref 0–100)
LDLC/HDLC SERPL: 1.55 {RATIO}
LEUKOCYTE ESTERASE UR QL STRIP.AUTO: ABNORMAL
LYMPHOCYTES # BLD AUTO: 1.92 10*3/MM3 (ref 0.7–3.1)
LYMPHOCYTES NFR BLD AUTO: 21.5 % (ref 19.6–45.3)
MCH RBC QN AUTO: 31.2 PG (ref 26.6–33)
MCHC RBC AUTO-ENTMCNC: 33.4 G/DL (ref 31.5–35.7)
MCV RBC AUTO: 93.2 FL (ref 79–97)
METHADONE UR QL SCN: NEGATIVE
MONOCYTES # BLD AUTO: 0.63 10*3/MM3 (ref 0.1–0.9)
MONOCYTES NFR BLD AUTO: 7 % (ref 5–12)
NEUTROPHILS NFR BLD AUTO: 6.03 10*3/MM3 (ref 1.7–7)
NEUTROPHILS NFR BLD AUTO: 67.4 % (ref 42.7–76)
NITRITE UR QL STRIP: NEGATIVE
NRBC BLD AUTO-RTO: 0 /100 WBC (ref 0–0.2)
OPIATES UR QL: NEGATIVE
OXYCODONE UR QL SCN: NEGATIVE
PH UR STRIP.AUTO: 6 [PH] (ref 5–8)
PLATELET # BLD AUTO: 250 10*3/MM3 (ref 140–450)
PMV BLD AUTO: 12.2 FL (ref 6–12)
POTASSIUM SERPL-SCNC: 4 MMOL/L (ref 3.5–5.2)
PROT SERPL-MCNC: 7.3 G/DL (ref 6–8.5)
PROT UR QL STRIP: NEGATIVE
RBC # BLD AUTO: 4.43 10*6/MM3 (ref 3.77–5.28)
RBC # UR STRIP: ABNORMAL /HPF
REF LAB TEST METHOD: ABNORMAL
SODIUM SERPL-SCNC: 141 MMOL/L (ref 136–145)
SP GR UR STRIP: 1.02 (ref 1–1.03)
SQUAMOUS #/AREA URNS HPF: ABNORMAL /HPF
TRIGL SERPL-MCNC: 121 MG/DL (ref 0–150)
TSH SERPL DL<=0.05 MIU/L-ACNC: 3.96 UIU/ML (ref 0.27–4.2)
URATE SERPL-MCNC: 6.1 MG/DL (ref 2.4–5.7)
UROBILINOGEN UR QL STRIP: ABNORMAL
VLDLC SERPL-MCNC: 22 MG/DL (ref 5–40)
WBC # UR STRIP: ABNORMAL /HPF
WBC NRBC COR # BLD: 8.95 10*3/MM3 (ref 3.4–10.8)

## 2022-06-16 PROCEDURE — 80307 DRUG TEST PRSMV CHEM ANLYZR: CPT | Performed by: NURSE PRACTITIONER

## 2022-06-16 PROCEDURE — 80053 COMPREHEN METABOLIC PANEL: CPT | Performed by: NURSE PRACTITIONER

## 2022-06-16 PROCEDURE — 87086 URINE CULTURE/COLONY COUNT: CPT | Performed by: NURSE PRACTITIONER

## 2022-06-16 PROCEDURE — 83036 HEMOGLOBIN GLYCOSYLATED A1C: CPT | Performed by: NURSE PRACTITIONER

## 2022-06-16 PROCEDURE — 80061 LIPID PANEL: CPT | Performed by: NURSE PRACTITIONER

## 2022-06-16 PROCEDURE — 84443 ASSAY THYROID STIM HORMONE: CPT | Performed by: NURSE PRACTITIONER

## 2022-06-16 PROCEDURE — 99214 OFFICE O/P EST MOD 30 MIN: CPT | Performed by: NURSE PRACTITIONER

## 2022-06-16 PROCEDURE — 84550 ASSAY OF BLOOD/URIC ACID: CPT | Performed by: NURSE PRACTITIONER

## 2022-06-16 PROCEDURE — 36415 COLL VENOUS BLD VENIPUNCTURE: CPT | Performed by: NURSE PRACTITIONER

## 2022-06-16 PROCEDURE — 81001 URINALYSIS AUTO W/SCOPE: CPT | Performed by: NURSE PRACTITIONER

## 2022-06-16 PROCEDURE — 82043 UR ALBUMIN QUANTITATIVE: CPT | Performed by: NURSE PRACTITIONER

## 2022-06-16 PROCEDURE — 85025 COMPLETE CBC W/AUTO DIFF WBC: CPT | Performed by: NURSE PRACTITIONER

## 2022-06-16 RX ORDER — METOPROLOL SUCCINATE 50 MG/1
50 TABLET, EXTENDED RELEASE ORAL DAILY
Qty: 90 TABLET | Refills: 1 | Status: SHIPPED | OUTPATIENT
Start: 2022-06-16 | End: 2022-12-08 | Stop reason: SDUPTHER

## 2022-06-16 RX ORDER — HYDROCHLOROTHIAZIDE 12.5 MG/1
12.5 TABLET ORAL DAILY
Qty: 90 TABLET | Refills: 1 | Status: SHIPPED | OUTPATIENT
Start: 2022-06-16 | End: 2022-12-08 | Stop reason: SDUPTHER

## 2022-06-16 RX ORDER — TRAMADOL HYDROCHLORIDE 50 MG/1
50 TABLET ORAL EVERY 8 HOURS PRN
Qty: 90 TABLET | Refills: 2 | Status: SHIPPED | OUTPATIENT
Start: 2022-06-16 | End: 2022-09-28 | Stop reason: SDUPTHER

## 2022-06-16 RX ORDER — ALLOPURINOL 100 MG/1
100 TABLET ORAL DAILY
Qty: 90 TABLET | Refills: 1 | Status: SHIPPED | OUTPATIENT
Start: 2022-06-16 | End: 2022-12-08 | Stop reason: SDUPTHER

## 2022-06-16 RX ORDER — CLOPIDOGREL BISULFATE 75 MG/1
75 TABLET ORAL DAILY
Qty: 90 TABLET | Refills: 1 | Status: SHIPPED | OUTPATIENT
Start: 2022-06-16 | End: 2022-12-08 | Stop reason: SDUPTHER

## 2022-06-16 RX ORDER — NITROGLYCERIN 0.4 MG/1
0.4 TABLET SUBLINGUAL
Qty: 25 TABLET | Refills: 1 | Status: SHIPPED | OUTPATIENT
Start: 2022-06-16 | End: 2022-12-08 | Stop reason: SDUPTHER

## 2022-06-16 RX ORDER — PANTOPRAZOLE SODIUM 40 MG/1
40 TABLET, DELAYED RELEASE ORAL DAILY
Qty: 90 TABLET | Refills: 1 | Status: SHIPPED | OUTPATIENT
Start: 2022-06-16 | End: 2022-12-08 | Stop reason: SDUPTHER

## 2022-06-16 RX ORDER — PREGABALIN 75 MG/1
75 CAPSULE ORAL NIGHTLY
Qty: 30 CAPSULE | Refills: 2 | Status: SHIPPED | OUTPATIENT
Start: 2022-06-16 | End: 2022-09-28 | Stop reason: SDUPTHER

## 2022-06-16 RX ORDER — LEVOTHYROXINE SODIUM 0.07 MG/1
75 TABLET ORAL
Qty: 90 TABLET | Refills: 1 | Status: SHIPPED | OUTPATIENT
Start: 2022-06-16 | End: 2022-12-08 | Stop reason: SDUPTHER

## 2022-06-16 RX ORDER — ATORVASTATIN CALCIUM 40 MG/1
40 TABLET, FILM COATED ORAL DAILY
Qty: 90 TABLET | Refills: 1 | Status: SHIPPED | OUTPATIENT
Start: 2022-06-16 | End: 2022-12-08 | Stop reason: SDUPTHER

## 2022-06-16 NOTE — PROGRESS NOTES
Please mail letter to patient stating    Effie, the hemoglobin A1c has improved from the last visit and is now down to 6.1% showing you are still in the prediabetic range; the urine microalbumin and the yearly urine tox screen were all normal/negative; and then the urinalysis shows 2+ leukocytes and there was 2+ bacteria microscopically and normally they do send out a reflex culture-- I will let you know what that shows and if an antibiotic is indicated    All of the blood counts were normal range; and the uric acid level has dropped from 6.5 down to 6.1 and normal would be 5.7 and last and 1-1/2 years ago it was at 9.3 it is far better; thyroid levels were normal range;     Comprehensive panel shows fasting glucose of 111 and normal liver function tests and normal electrolytes and I know you were fasting for your labs but it does show you were dehydrated and that your filtration rates still show consistent with stable chronic kidney disease stage III; and then all of your cholesterol panel was completely normal with the exception of a slightly low HDL at 38 and it should be 40 or greater

## 2022-06-16 NOTE — PROGRESS NOTES
Venipuncture Blood Specimen Collection  Venipuncture performed in left arm  by Lupe White with good hemostasis. Patient tolerated the procedure well without complications.   06/16/22   Lupe White

## 2022-06-16 NOTE — PROGRESS NOTES
Chief Complaint  Med Refill    Subjective            Ada FÁTIMA Holliday presents to [facility]  Patient is here today for her q. 6-month follow-up on her chronic comorbid conditions she needs refills and fasting labs as well    -- Hypertension with cardiomyopathy: Patient denies any chest pain palpitations dizziness lightheadedness or syncopal episode-no weight gain-no edema-and the only shortness of breath she experiences is mildly in extreme heat outside overall is stable--and still sees cardiology regularly    -- HLD: Stable on the medications no side effects no issues reported    -- Hypothyroidism: Stable on medications no overt exacerbated fatigue or issues with hair or nails reported-no weight gain-    --GERD: Stable on the medications no breakthrough symptoms reported no side effects no issues    --Gout: Patient remains on her medication on a daily basis and has also started doing a natural cherry pill at night and reports it has really helped her joints and the gout not to flareup overall stable no side effects no issues    --Peripheral nerve disease and polyarthralgia and osteoarthritis: Patient with a diagnosis of chronic kidney disease stage III unable to take any nonsteroidal anti-inflammatory-is currently on the tramadol up to 3 times a day and Lyrica once daily--has been on both of these for many years with no issues no falls no side effects reported no dizziness or lightheadedness-very compliant with her follow-up visits although patient forgot that she has to follow-up every 3 months and wondered why she was running out of her medication-so we discussed this and she will start following up in between the every 6 month fasting labs and refills for other chronic comorbid at the every 3 months--getting her urine tox screen today last 1 was completely negative--shows no aberrant behaviors no signs and symptoms of misuse nor abuse and Raz report was appropriate--we will  print these and they will be faxed through [facility]          PHQ-2 Total Score: 0  PHQ-9 Total Score: 0    Past Medical History:   Diagnosis Date   • Benign essential hypertension    • CKD (chronic kidney disease)    • GERD (gastroesophageal reflux disease)    • Gout    • HLD (hyperlipidemia)    • Hypothyroidism        Allergies   Allergen Reactions   • Contrast Dye Unknown - High Severity     Category: IV Contrast Dyes;      • Penicillins Rash   • Ibandronic Acid Nausea Only and Unknown - High Severity        Past Surgical History:   Procedure Laterality Date   • CHOLECYSTECTOMY     • HYSTERECTOMY          Social History     Tobacco Use   • Smoking status: Former Smoker     Packs/day: 0.50     Years: 5.00     Pack years: 2.50     Types: Cigarettes   • Smokeless tobacco: Never Used   Vaping Use   • Vaping Use: Never used   Substance Use Topics   • Alcohol use: Never   • Drug use: Never       Family History   Problem Relation Age of Onset   • Arthritis Father    • Colon cancer Father    • Colon cancer Brother         Health Maintenance Due   Topic Date Due   • COVID-19 Vaccine (1) Never done   • Pneumococcal Vaccine 65+ (1 - PCV) Never done   • TDAP/TD VACCINES (1 - Tdap) Never done   • ZOSTER VACCINE (1 of 2) Never done   • HEPATITIS C SCREENING  Never done   • ANNUAL WELLNESS VISIT  09/08/2021   • DXA SCAN  01/14/2022   • URINE MICROALBUMIN  02/22/2022        Current Outpatient Medications on File Prior to Visit   Medication Sig   • aspirin 81 MG EC tablet Aspir-81 81 mg oral tablet,delayed release (DR/EC) take 1 tablet (81 mg) by oral route once daily   Suspended   • Blood Glucose Monitoring Suppl (FreeStyle Lite) device    • JOHNSON PO Take  by mouth. Johnson gels at HS for the gout pain OTC   • glucose blood test strip 1 each by Other route Daily. Use as instructed   • Lancets (freestyle) lancets 1 each by Other route Daily. Use as instructed   • sacubitril-valsartan (ENTRESTO) 24-26 MG tablet Take 1 tablet by  mouth 2 (Two) Times a Day.   • [DISCONTINUED] allopurinol (ZYLOPRIM) 100 MG tablet Take 1 tablet by mouth Daily.   • [DISCONTINUED] atorvastatin (LIPITOR) 40 MG tablet Take 1 tablet by mouth Daily.   • [DISCONTINUED] clopidogrel (PLAVIX) 75 MG tablet Take 1 tablet by mouth Daily.   • [DISCONTINUED] hydroCHLOROthiazide (HYDRODIURIL) 12.5 MG tablet Take 1 tablet by mouth Daily.   • [DISCONTINUED] levothyroxine (SYNTHROID, LEVOTHROID) 75 MCG tablet Take 1 tablet by mouth Every Morning.   • [DISCONTINUED] metoprolol succinate XL (TOPROL-XL) 50 MG 24 hr tablet Take 1 tablet by mouth Daily.   • [DISCONTINUED] nitroglycerin (NITROSTAT) 0.4 MG SL tablet Take 1 tablet by mouth Every 5 (Five) Minutes As Needed for Chest Pain.   • [DISCONTINUED] pantoprazole (PROTONIX) 40 MG EC tablet Take 1 tablet by mouth Daily.   • [DISCONTINUED] pregabalin (Lyrica) 75 MG capsule Take 1 capsule by mouth Every Night.   • [DISCONTINUED] traMADol (Ultram) 50 MG tablet Take 1 tablet by mouth Every 8 (Eight) Hours As Needed for Moderate Pain .     No current facility-administered medications on file prior to visit.       Immunization History   Administered Date(s) Administered   • Hepatitis A 10/10/2018, 09/08/2020   • Influenza, Unspecified 10/10/2018, 09/30/2019       Review of Systems   Constitutional: Negative for chills, fatigue and fever.   HENT: Negative.    Eyes: Negative for blurred vision and double vision.   Respiratory: Negative for cough and shortness of breath.         Only SOB with out in the extreme heat    Cardiovascular: Negative for chest pain, palpitations and leg swelling.   Endocrine: Negative.    Genitourinary: Negative for dysuria.   Musculoskeletal: Positive for arthralgias, gait problem and myalgias.        Left hip and knee    Skin: Negative for rash and wound.   Allergic/Immunologic: Negative for environmental allergies.   Neurological: Positive for numbness. Negative for dizziness, tremors, syncope, weakness and  "light-headedness.   Hematological: Bruises/bleeds easily.   Psychiatric/Behavioral: Negative for self-injury, suicidal ideas, depressed mood and stress. The patient is not nervous/anxious.         Objective     /74 (BP Location: Right arm, Patient Position: Sitting, Cuff Size: Adult)   Pulse 95   Temp 96.9 °F (36.1 °C) (Temporal)   Ht 154.9 cm (61\")   Wt 79.8 kg (176 lb)   SpO2 96%   BMI 33.25 kg/m²       Physical Exam  Vitals and nursing note reviewed.   Constitutional:       Appearance: Normal appearance.   HENT:      Head: Normocephalic.      Right Ear: External ear normal.      Left Ear: External ear normal.      Nose: Nose normal.   Eyes:      Pupils: Pupils are equal, round, and reactive to light.   Cardiovascular:      Rate and Rhythm: Normal rate and regular rhythm.      Pulses:           Carotid pulses are 2+ on the right side and 2+ on the left side.     Heart sounds: Normal heart sounds.   Pulmonary:      Effort: Pulmonary effort is normal.      Breath sounds: Normal breath sounds.   Abdominal:      General: Bowel sounds are normal.      Palpations: Abdomen is soft.      Tenderness: There is no abdominal tenderness.   Musculoskeletal:      Right hand: Swelling, deformity and tenderness present. Decreased range of motion.      Left hand: Swelling, deformity and tenderness present. Decreased range of motion.      Cervical back: Normal range of motion and neck supple.      Left knee: Bony tenderness and crepitus present. Decreased range of motion. Tenderness present.      Right lower leg: No edema.      Left lower leg: No edema.      Comments: Pt uses the cane --and then the feet and hands with decreased sensation   Skin:     General: Skin is warm and dry.   Neurological:      Mental Status: She is alert and oriented to person, place, and time.   Psychiatric:         Mood and Affect: Mood normal.         Behavior: Behavior normal.         Thought Content: Thought content normal.         Judgment: " Judgment normal.         Result Review :                          Assessment and Plan      Diagnoses and all orders for this visit:    1. Benign essential hypertension (Primary)  -     levothyroxine (SYNTHROID, LEVOTHROID) 75 MCG tablet; Take 1 tablet by mouth Every Morning.  Dispense: 90 tablet; Refill: 1  -     metoprolol succinate XL (TOPROL-XL) 50 MG 24 hr tablet; Take 1 tablet by mouth Daily.  Dispense: 90 tablet; Refill: 1  -     hydroCHLOROthiazide (HYDRODIURIL) 12.5 MG tablet; Take 1 tablet by mouth Daily.  Dispense: 90 tablet; Refill: 1  -     Urinalysis With Culture If Indicated - Urine, Clean Catch  -     CBC & Differential  -     Comprehensive Metabolic Panel  -     Lipid Panel  -     MicroAlbumin, Urine, Random - Urine, Clean Catch  -     TSH Rfx On Abnormal To Free T4    2. Hypothyroidism, unspecified type  -     levothyroxine (SYNTHROID, LEVOTHROID) 75 MCG tablet; Take 1 tablet by mouth Every Morning.  Dispense: 90 tablet; Refill: 1  -     TSH Rfx On Abnormal To Free T4    3. Cardiomyopathy, unspecified type (HCC)  -     clopidogrel (PLAVIX) 75 MG tablet; Take 1 tablet by mouth Daily.  Dispense: 90 tablet; Refill: 1  -     metoprolol succinate XL (TOPROL-XL) 50 MG 24 hr tablet; Take 1 tablet by mouth Daily.  Dispense: 90 tablet; Refill: 1  -     nitroglycerin (NITROSTAT) 0.4 MG SL tablet; Take 1 tablet by mouth Every 5 (Five) Minutes As Needed for Chest Pain.  Dispense: 25 tablet; Refill: 1  -     CBC & Differential  -     Comprehensive Metabolic Panel  -     MicroAlbumin, Urine, Random - Urine, Clean Catch  -     TSH Rfx On Abnormal To Free T4    4. Mixed hyperlipidemia  -     atorvastatin (LIPITOR) 40 MG tablet; Take 1 tablet by mouth Daily.  Dispense: 90 tablet; Refill: 1  -     Comprehensive Metabolic Panel  -     Lipid Panel    5. Chronic gout without tophus, unspecified cause, unspecified site  -     allopurinol (ZYLOPRIM) 100 MG tablet; Take 1 tablet by mouth Daily.  Dispense: 90 tablet;  Refill: 1  -     Uric Acid  -     Urine Drug Screen - Urine, Clean Catch    6. Stage 3b chronic kidney disease (HCC)  -     pregabalin (Lyrica) 75 MG capsule; Take 1 capsule by mouth Every Night.  Dispense: 30 capsule; Refill: 2  -     traMADol (Ultram) 50 MG tablet; Take 1 tablet by mouth Every 8 (Eight) Hours As Needed for Moderate Pain .  Dispense: 90 tablet; Refill: 2  -     Comprehensive Metabolic Panel    7. Peripheral nerve disease  -     pregabalin (Lyrica) 75 MG capsule; Take 1 capsule by mouth Every Night.  Dispense: 30 capsule; Refill: 2  -     traMADol (Ultram) 50 MG tablet; Take 1 tablet by mouth Every 8 (Eight) Hours As Needed for Moderate Pain .  Dispense: 90 tablet; Refill: 2  -     CBC & Differential  -     Urine Drug Screen - Urine, Clean Catch    8. Polyarthropathy  -     traMADol (Ultram) 50 MG tablet; Take 1 tablet by mouth Every 8 (Eight) Hours As Needed for Moderate Pain .  Dispense: 90 tablet; Refill: 2  -     CBC & Differential  -     Urine Drug Screen - Urine, Clean Catch    9. Gastroesophageal reflux disease without esophagitis  -     pantoprazole (PROTONIX) 40 MG EC tablet; Take 1 tablet by mouth Daily.  Dispense: 90 tablet; Refill: 1  -     CBC & Differential    10. Prediabetes  -     Hemoglobin A1c    11. High risk medication use  -     Urine Drug Screen - Urine, Clean Catch            Follow Up     Return in about 3 months (around 9/16/2022), or if symptoms worsen or fail to improve, for Recheck.

## 2022-06-18 LAB — BACTERIA SPEC AEROBE CULT: NORMAL

## 2022-06-20 NOTE — PROGRESS NOTES
Please mail letter to patient stating    Effie the final urine culture just shows mixed kendra so only if you are having symptoms would we want to recollect and sorry that the dictation on the prior letter misspelled  your middle name

## 2022-09-28 ENCOUNTER — OFFICE VISIT (OUTPATIENT)
Dept: FAMILY MEDICINE CLINIC | Facility: CLINIC | Age: 82
End: 2022-09-28

## 2022-09-28 VITALS
BODY MASS INDEX: 32.85 KG/M2 | DIASTOLIC BLOOD PRESSURE: 76 MMHG | TEMPERATURE: 96.8 F | OXYGEN SATURATION: 96 % | HEIGHT: 61 IN | SYSTOLIC BLOOD PRESSURE: 128 MMHG | HEART RATE: 87 BPM | WEIGHT: 174 LBS

## 2022-09-28 DIAGNOSIS — M13.0 POLYARTHROPATHY: Primary | ICD-10-CM

## 2022-09-28 DIAGNOSIS — G62.9 PERIPHERAL NERVE DISEASE: ICD-10-CM

## 2022-09-28 DIAGNOSIS — N18.32 STAGE 3B CHRONIC KIDNEY DISEASE: ICD-10-CM

## 2022-09-28 DIAGNOSIS — Z23 IMMUNIZATION DUE: ICD-10-CM

## 2022-09-28 DIAGNOSIS — E11.9 TYPE 2 DIABETES MELLITUS WITHOUT COMPLICATION, WITHOUT LONG-TERM CURRENT USE OF INSULIN: ICD-10-CM

## 2022-09-28 PROCEDURE — G0008 ADMIN INFLUENZA VIRUS VAC: HCPCS | Performed by: NURSE PRACTITIONER

## 2022-09-28 PROCEDURE — 99214 OFFICE O/P EST MOD 30 MIN: CPT | Performed by: NURSE PRACTITIONER

## 2022-09-28 PROCEDURE — 90662 IIV NO PRSV INCREASED AG IM: CPT | Performed by: NURSE PRACTITIONER

## 2022-09-28 RX ORDER — TRAMADOL HYDROCHLORIDE 50 MG/1
50 TABLET ORAL EVERY 8 HOURS PRN
Qty: 90 TABLET | Refills: 2 | Status: SHIPPED | OUTPATIENT
Start: 2022-09-28 | End: 2022-12-08 | Stop reason: SDUPTHER

## 2022-09-28 RX ORDER — BLOOD-GLUCOSE METER
1 KIT MISCELLANEOUS DAILY
Qty: 1 EACH | Refills: 0 | Status: SHIPPED | OUTPATIENT
Start: 2022-09-28

## 2022-09-28 RX ORDER — PREGABALIN 75 MG/1
75 CAPSULE ORAL NIGHTLY
Qty: 30 CAPSULE | Refills: 2 | Status: SHIPPED | OUTPATIENT
Start: 2022-09-28 | End: 2022-12-08 | Stop reason: SDUPTHER

## 2022-09-28 NOTE — PROGRESS NOTES
Chief Complaint  Med Refill (And she wants a Flu Shot)    Subjective            Ada FÁTIMA Holliday presents to [facility]  History of Present Illness  Pt here for the refills on the tramadol and lyrica--for the polyarthralgia and neuropathy pain R/T OA and DM--and unable to take NSAIDs R/T the CKD and the heart issues--has pacemaker and defibrillator --and these meds are tolerated well and no SE or dizziness no issues with the meds--and tolerates well     PEG: A Three-Item Scale Assessing Pain Intensity and Interference  0 being no pain 10 pain as bad as you can imagine  1. What number best describes your pain on average in the past week? 8-9/10 at times--then other times 6-7/10 regularly     2.  What number best describes how, during the past week, pain has interfered with your enjoyment of life? 5/10   3.  What number best describes how, during the past week, pain has interfered with your general activity? 8/10     Also needs the new glucose meter --hers broke       PHQ-2 Total Score: 0  PHQ-9 Total Score: 0    Past Medical History:   Diagnosis Date   • Benign essential hypertension    • CKD (chronic kidney disease)    • GERD (gastroesophageal reflux disease)    • Gout    • HLD (hyperlipidemia)    • Hypothyroidism        Allergies   Allergen Reactions   • Contrast Dye Unknown - High Severity     Category: IV Contrast Dyes;      • Penicillins Rash   • Ibandronic Acid Nausea Only and Unknown - High Severity        Past Surgical History:   Procedure Laterality Date   • CHOLECYSTECTOMY     • HYSTERECTOMY          Social History     Tobacco Use   • Smoking status: Former Smoker     Packs/day: 0.50     Years: 5.00     Pack years: 2.50     Types: Cigarettes   • Smokeless tobacco: Never Used   Vaping Use   • Vaping Use: Never used   Substance Use Topics   • Alcohol use: Never   • Drug use: Never       Family History   Problem Relation Age of Onset   • Arthritis Father    • Colon cancer  Father    • Colon cancer Brother         Health Maintenance Due   Topic Date Due   • Pneumococcal Vaccine 65+ (1 - PCV) Never done   • TDAP/TD VACCINES (1 - Tdap) Never done   • ZOSTER VACCINE (1 of 2) Never done   • HEPATITIS C SCREENING  Never done   • ANNUAL WELLNESS VISIT  09/08/2021   • COVID-19 Vaccine (4 - Booster for Moderna series) 12/29/2021   • DXA SCAN  01/14/2022        Current Outpatient Medications on File Prior to Visit   Medication Sig   • allopurinol (ZYLOPRIM) 100 MG tablet Take 1 tablet by mouth Daily.   • aspirin 81 MG EC tablet Aspir-81 81 mg oral tablet,delayed release (DR/EC) take 1 tablet (81 mg) by oral route once daily   Suspended   • atorvastatin (LIPITOR) 40 MG tablet Take 1 tablet by mouth Daily.   • JOHNSON PO Take  by mouth. Johnson gels at HS for the gout pain OTC   • clopidogrel (PLAVIX) 75 MG tablet Take 1 tablet by mouth Daily.   • glucose blood test strip 1 each by Other route Daily. Use as instructed   • hydroCHLOROthiazide (HYDRODIURIL) 12.5 MG tablet Take 1 tablet by mouth Daily.   • Lancets (freestyle) lancets 1 each by Other route Daily. Use as instructed   • levothyroxine (SYNTHROID, LEVOTHROID) 75 MCG tablet Take 1 tablet by mouth Every Morning.   • metoprolol succinate XL (TOPROL-XL) 50 MG 24 hr tablet Take 1 tablet by mouth Daily.   • nitroglycerin (NITROSTAT) 0.4 MG SL tablet Take 1 tablet by mouth Every 5 (Five) Minutes As Needed for Chest Pain.   • pantoprazole (PROTONIX) 40 MG EC tablet Take 1 tablet by mouth Daily.   • sacubitril-valsartan (ENTRESTO) 24-26 MG tablet Take 1 tablet by mouth 2 (Two) Times a Day.   • [DISCONTINUED] Blood Glucose Monitoring Suppl (FreeStyle Lite) device    • [DISCONTINUED] pregabalin (Lyrica) 75 MG capsule Take 1 capsule by mouth Every Night.   • [DISCONTINUED] traMADol (Ultram) 50 MG tablet Take 1 tablet by mouth Every 8 (Eight) Hours As Needed for Moderate Pain .   • [DISCONTINUED] Nirmatrelvir&Ritonavir 300/100 (PAXLOVID) 20 x 150 MG &  "10 x 100MG tablet therapy pack tablet TAKE 2 TABLETS BY MOUTH TWICE DAILY FOR 5 DAYS     No current facility-administered medications on file prior to visit.       Immunization History   Administered Date(s) Administered   • COVID-19 (MODERNA) 1st, 2nd, 3rd Dose Only 01/21/2021, 02/23/2021, 11/03/2021   • FLUAD TRI 65YR+ 10/26/2017   • Fluzone High Dose =>65 Years ([employer] ONLY) 10/27/2014, 10/28/2015, 10/26/2016   • Fluzone High-Dose 65+yrs 09/27/2021, 09/28/2022   • Hepatitis A 10/10/2018, 09/08/2020   • Influenza, Unspecified 10/10/2018, 09/30/2019       Review of Systems   Constitutional: Positive for fatigue.        Improving from the COVID in July   HENT: Negative for trouble swallowing.    Eyes: Negative for blurred vision and double vision.   Respiratory: Negative for choking and shortness of breath.         Some SOBOE unchanged    Cardiovascular: Negative for chest pain and leg swelling.   Gastrointestinal: Negative for abdominal pain, blood in stool, nausea and vomiting.   Endocrine: Negative for polydipsia, polyphagia and polyuria.   Genitourinary: Positive for frequency. Negative for dysuria.   Musculoskeletal: Positive for arthralgias and gait problem.        Left hip and left knee and OA  Multiple joints    Skin: Negative.  Positive for bruise.        To the arms    Neurological: Positive for numbness. Negative for dizziness, syncope and light-headedness.   Hematological: Bruises/bleeds easily.   Psychiatric/Behavioral: Negative.  Negative for self-injury and suicidal ideas.        Objective     /76 (BP Location: Left arm, Patient Position: Sitting, Cuff Size: Adult)   Pulse 87   Temp 96.8 °F (36 °C) (Temporal)   Ht 154.9 cm (61\")   Wt 78.9 kg (174 lb)   SpO2 96%   BMI 32.88 kg/m²       Physical Exam  Vitals and nursing note reviewed.   Constitutional:       Appearance: Normal appearance.   HENT:      Head: Normocephalic.      Right Ear: External ear normal.      Left Ear: External ear " normal.      Nose: Nose normal.      Mouth/Throat:      Comments: Wearing mask  Eyes:      Pupils: Pupils are equal, round, and reactive to light.   Cardiovascular:      Rate and Rhythm: Normal rate and regular rhythm.      Heart sounds: Normal heart sounds.   Pulmonary:      Effort: Pulmonary effort is normal.      Breath sounds: Normal breath sounds.   Abdominal:      Palpations: Abdomen is soft.   Musculoskeletal:         General: Swelling and deformity present. No signs of injury.      Cervical back: Normal range of motion.      Comments: Multiple joints with stiffness and limited ROM  and uses the cane--and limited especially with shoulders and knee and hip left sided--and then finger joints welled as well    Skin:     General: Skin is warm and dry.   Neurological:      Mental Status: She is alert and oriented to person, place, and time.   Psychiatric:         Mood and Affect: Mood normal.         Behavior: Behavior normal.         Thought Content: Thought content normal.         Judgment: Judgment normal.         Result Review :                          Assessment and Plan      Diagnoses and all orders for this visit:    1. Polyarthropathy (Primary)  -     traMADol (Ultram) 50 MG tablet; Take 1 tablet by mouth Every 8 (Eight) Hours As Needed for Moderate Pain.  Dispense: 90 tablet; Refill: 2  -     Diclofenac Sodium (VOLTAREN) 1 % gel gel; Apply 4 g topically to the appropriate area as directed 4 (Four) Times a Day As Needed (OA joint pain).  Dispense: 350 g; Refill: 1    2. Peripheral nerve disease  -     traMADol (Ultram) 50 MG tablet; Take 1 tablet by mouth Every 8 (Eight) Hours As Needed for Moderate Pain.  Dispense: 90 tablet; Refill: 2  -     pregabalin (Lyrica) 75 MG capsule; Take 1 capsule by mouth Every Night.  Dispense: 30 capsule; Refill: 2    3. Stage 3b chronic kidney disease (HCC)  -     traMADol (Ultram) 50 MG tablet; Take 1 tablet by mouth Every 8 (Eight) Hours As Needed for Moderate Pain.   Dispense: 90 tablet; Refill: 2  -     pregabalin (Lyrica) 75 MG capsule; Take 1 capsule by mouth Every Night.  Dispense: 30 capsule; Refill: 2    4. Immunization due  Comments:  getting high dose flu shot   Orders:  -     Fluzone High-Dose 65+yrs (4621-5347)    5. Type 2 diabetes mellitus without complication, without long-term current use of insulin (HCC)  -     Blood Glucose Monitoring Suppl (FreeStyle Lite) device; 1 each Daily.  Dispense: 1 each; Refill: 0            Follow Up     Return in about 2 months (around 12/8/2022) for Recheck, fasting labs and refills.

## 2022-12-08 ENCOUNTER — OFFICE VISIT (OUTPATIENT)
Dept: FAMILY MEDICINE CLINIC | Facility: CLINIC | Age: 82
End: 2022-12-08

## 2022-12-08 VITALS
SYSTOLIC BLOOD PRESSURE: 128 MMHG | HEART RATE: 76 BPM | BODY MASS INDEX: 32.28 KG/M2 | DIASTOLIC BLOOD PRESSURE: 68 MMHG | HEIGHT: 61 IN | OXYGEN SATURATION: 97 % | WEIGHT: 171 LBS | TEMPERATURE: 98.1 F

## 2022-12-08 DIAGNOSIS — M1A.9XX0 CHRONIC GOUT WITHOUT TOPHUS, UNSPECIFIED CAUSE, UNSPECIFIED SITE: ICD-10-CM

## 2022-12-08 DIAGNOSIS — Z23 ENCOUNTER FOR IMMUNIZATION: ICD-10-CM

## 2022-12-08 DIAGNOSIS — Z71.85 VACCINE COUNSELING: ICD-10-CM

## 2022-12-08 DIAGNOSIS — I10 BENIGN ESSENTIAL HYPERTENSION: ICD-10-CM

## 2022-12-08 DIAGNOSIS — M81.6 LOCALIZED OSTEOPOROSIS WITHOUT CURRENT PATHOLOGICAL FRACTURE: ICD-10-CM

## 2022-12-08 DIAGNOSIS — M13.0 POLYARTHROPATHY: ICD-10-CM

## 2022-12-08 DIAGNOSIS — G62.9 PERIPHERAL NERVE DISEASE: ICD-10-CM

## 2022-12-08 DIAGNOSIS — Z00.00 MEDICARE ANNUAL WELLNESS VISIT, SUBSEQUENT: Primary | ICD-10-CM

## 2022-12-08 DIAGNOSIS — N18.32 STAGE 3B CHRONIC KIDNEY DISEASE: ICD-10-CM

## 2022-12-08 DIAGNOSIS — E78.2 MIXED HYPERLIPIDEMIA: ICD-10-CM

## 2022-12-08 DIAGNOSIS — K21.9 GASTROESOPHAGEAL REFLUX DISEASE WITHOUT ESOPHAGITIS: ICD-10-CM

## 2022-12-08 DIAGNOSIS — E11.9 DIET-CONTROLLED DIABETES MELLITUS: ICD-10-CM

## 2022-12-08 DIAGNOSIS — E03.9 HYPOTHYROIDISM, UNSPECIFIED TYPE: ICD-10-CM

## 2022-12-08 DIAGNOSIS — E83.52 HYPERCALCEMIA: Primary | ICD-10-CM

## 2022-12-08 DIAGNOSIS — I42.9 CARDIOMYOPATHY, UNSPECIFIED TYPE: ICD-10-CM

## 2022-12-08 LAB
ALBUMIN SERPL-MCNC: 4.2 G/DL (ref 3.5–5.2)
ALBUMIN/GLOB SERPL: 1.2 G/DL
ALP SERPL-CCNC: 99 U/L (ref 39–117)
ALT SERPL W P-5'-P-CCNC: 18 U/L (ref 1–33)
ANION GAP SERPL CALCULATED.3IONS-SCNC: 10.1 MMOL/L (ref 5–15)
AST SERPL-CCNC: 29 U/L (ref 1–32)
BACTERIA UR QL AUTO: ABNORMAL /HPF
BASOPHILS # BLD AUTO: 0.04 10*3/MM3 (ref 0–0.2)
BASOPHILS NFR BLD AUTO: 0.6 % (ref 0–1.5)
BILIRUB SERPL-MCNC: 0.7 MG/DL (ref 0–1.2)
BILIRUB UR QL STRIP: NEGATIVE
BUN SERPL-MCNC: 27 MG/DL (ref 8–23)
BUN/CREAT SERPL: 20.8 (ref 7–25)
CALCIUM SPEC-SCNC: 10.7 MG/DL (ref 8.6–10.5)
CHLORIDE SERPL-SCNC: 105 MMOL/L (ref 98–107)
CHOLEST SERPL-MCNC: 136 MG/DL (ref 0–200)
CLARITY UR: CLEAR
CO2 SERPL-SCNC: 26.9 MMOL/L (ref 22–29)
COLOR UR: ABNORMAL
CREAT SERPL-MCNC: 1.3 MG/DL (ref 0.57–1)
DEPRECATED RDW RBC AUTO: 40.8 FL (ref 37–54)
EGFRCR SERPLBLD CKD-EPI 2021: 41.7 ML/MIN/1.73
EOSINOPHIL # BLD AUTO: 0.31 10*3/MM3 (ref 0–0.4)
EOSINOPHIL NFR BLD AUTO: 4.3 % (ref 0.3–6.2)
ERYTHROCYTE [DISTWIDTH] IN BLOOD BY AUTOMATED COUNT: 12.5 % (ref 12.3–15.4)
GLOBULIN UR ELPH-MCNC: 3.4 GM/DL
GLUCOSE SERPL-MCNC: 108 MG/DL (ref 65–99)
GLUCOSE UR STRIP-MCNC: ABNORMAL MG/DL
HBA1C MFR BLD: 6 % (ref 4.8–5.6)
HCT VFR BLD AUTO: 43.6 % (ref 34–46.6)
HDLC SERPL-MCNC: 37 MG/DL (ref 40–60)
HGB BLD-MCNC: 14.9 G/DL (ref 12–15.9)
HGB UR QL STRIP.AUTO: NEGATIVE
HYALINE CASTS UR QL AUTO: ABNORMAL /LPF
IMM GRANULOCYTES # BLD AUTO: 0.01 10*3/MM3 (ref 0–0.05)
IMM GRANULOCYTES NFR BLD AUTO: 0.1 % (ref 0–0.5)
KETONES UR QL STRIP: NEGATIVE
LDLC SERPL CALC-MCNC: 76 MG/DL (ref 0–100)
LDLC/HDLC SERPL: 1.99 {RATIO}
LEUKOCYTE ESTERASE UR QL STRIP.AUTO: ABNORMAL
LYMPHOCYTES # BLD AUTO: 1.94 10*3/MM3 (ref 0.7–3.1)
LYMPHOCYTES NFR BLD AUTO: 26.9 % (ref 19.6–45.3)
MCH RBC QN AUTO: 30.2 PG (ref 26.6–33)
MCHC RBC AUTO-ENTMCNC: 34.2 G/DL (ref 31.5–35.7)
MCV RBC AUTO: 88.3 FL (ref 79–97)
MONOCYTES # BLD AUTO: 0.6 10*3/MM3 (ref 0.1–0.9)
MONOCYTES NFR BLD AUTO: 8.3 % (ref 5–12)
NEUTROPHILS NFR BLD AUTO: 4.31 10*3/MM3 (ref 1.7–7)
NEUTROPHILS NFR BLD AUTO: 59.8 % (ref 42.7–76)
NITRITE UR QL STRIP: NEGATIVE
NRBC BLD AUTO-RTO: 0 /100 WBC (ref 0–0.2)
PH UR STRIP.AUTO: 6 [PH] (ref 5–8)
PLATELET # BLD AUTO: 236 10*3/MM3 (ref 140–450)
PMV BLD AUTO: 12.5 FL (ref 6–12)
POTASSIUM SERPL-SCNC: 4.1 MMOL/L (ref 3.5–5.2)
PROT SERPL-MCNC: 7.6 G/DL (ref 6–8.5)
PROT UR QL STRIP: ABNORMAL
RBC # BLD AUTO: 4.94 10*6/MM3 (ref 3.77–5.28)
RBC # UR STRIP: ABNORMAL /HPF
REF LAB TEST METHOD: ABNORMAL
SODIUM SERPL-SCNC: 142 MMOL/L (ref 136–145)
SP GR UR STRIP: 1.03 (ref 1–1.03)
SQUAMOUS #/AREA URNS HPF: ABNORMAL /HPF
TRIGL SERPL-MCNC: 126 MG/DL (ref 0–150)
TSH SERPL DL<=0.05 MIU/L-ACNC: 3.08 UIU/ML (ref 0.27–4.2)
UROBILINOGEN UR QL STRIP: ABNORMAL
VLDLC SERPL-MCNC: 23 MG/DL (ref 5–40)
WBC # UR STRIP: ABNORMAL /HPF
WBC NRBC COR # BLD: 7.21 10*3/MM3 (ref 3.4–10.8)

## 2022-12-08 PROCEDURE — 1159F MED LIST DOCD IN RCRD: CPT | Performed by: NURSE PRACTITIONER

## 2022-12-08 PROCEDURE — G0439 PPPS, SUBSEQ VISIT: HCPCS | Performed by: NURSE PRACTITIONER

## 2022-12-08 PROCEDURE — G0009 ADMIN PNEUMOCOCCAL VACCINE: HCPCS | Performed by: NURSE PRACTITIONER

## 2022-12-08 PROCEDURE — 1170F FXNL STATUS ASSESSED: CPT | Performed by: NURSE PRACTITIONER

## 2022-12-08 PROCEDURE — 1125F AMNT PAIN NOTED PAIN PRSNT: CPT | Performed by: NURSE PRACTITIONER

## 2022-12-08 PROCEDURE — 85025 COMPLETE CBC W/AUTO DIFF WBC: CPT | Performed by: NURSE PRACTITIONER

## 2022-12-08 PROCEDURE — 80053 COMPREHEN METABOLIC PANEL: CPT | Performed by: NURSE PRACTITIONER

## 2022-12-08 PROCEDURE — 87086 URINE CULTURE/COLONY COUNT: CPT | Performed by: NURSE PRACTITIONER

## 2022-12-08 PROCEDURE — 81001 URINALYSIS AUTO W/SCOPE: CPT | Performed by: NURSE PRACTITIONER

## 2022-12-08 PROCEDURE — 80061 LIPID PANEL: CPT | Performed by: NURSE PRACTITIONER

## 2022-12-08 PROCEDURE — 83036 HEMOGLOBIN GLYCOSYLATED A1C: CPT | Performed by: NURSE PRACTITIONER

## 2022-12-08 PROCEDURE — 90677 PCV20 VACCINE IM: CPT | Performed by: NURSE PRACTITIONER

## 2022-12-08 PROCEDURE — 36415 COLL VENOUS BLD VENIPUNCTURE: CPT | Performed by: NURSE PRACTITIONER

## 2022-12-08 PROCEDURE — 84443 ASSAY THYROID STIM HORMONE: CPT | Performed by: NURSE PRACTITIONER

## 2022-12-08 RX ORDER — LEVOTHYROXINE SODIUM 0.07 MG/1
75 TABLET ORAL
Qty: 90 TABLET | Refills: 1 | Status: SHIPPED | OUTPATIENT
Start: 2022-12-08

## 2022-12-08 RX ORDER — PANTOPRAZOLE SODIUM 40 MG/1
40 TABLET, DELAYED RELEASE ORAL DAILY
Qty: 90 TABLET | Refills: 1 | Status: SHIPPED | OUTPATIENT
Start: 2022-12-08

## 2022-12-08 RX ORDER — PREGABALIN 75 MG/1
75 CAPSULE ORAL NIGHTLY
Qty: 30 CAPSULE | Refills: 2 | Status: SHIPPED | OUTPATIENT
Start: 2022-12-08 | End: 2023-03-09 | Stop reason: SDUPTHER

## 2022-12-08 RX ORDER — ALLOPURINOL 100 MG/1
100 TABLET ORAL DAILY
Qty: 90 TABLET | Refills: 1 | Status: SHIPPED | OUTPATIENT
Start: 2022-12-08

## 2022-12-08 RX ORDER — METOPROLOL SUCCINATE 50 MG/1
50 TABLET, EXTENDED RELEASE ORAL DAILY
Qty: 90 TABLET | Refills: 1 | Status: SHIPPED | OUTPATIENT
Start: 2022-12-08

## 2022-12-08 RX ORDER — ATORVASTATIN CALCIUM 40 MG/1
40 TABLET, FILM COATED ORAL DAILY
Qty: 90 TABLET | Refills: 1 | Status: SHIPPED | OUTPATIENT
Start: 2022-12-08

## 2022-12-08 RX ORDER — LANCETS 28 GAUGE
1 EACH MISCELLANEOUS DAILY
Qty: 100 EACH | Refills: 3 | Status: SHIPPED | OUTPATIENT
Start: 2022-12-08

## 2022-12-08 RX ORDER — CLOPIDOGREL BISULFATE 75 MG/1
75 TABLET ORAL DAILY
Qty: 90 TABLET | Refills: 1 | Status: SHIPPED | OUTPATIENT
Start: 2022-12-08

## 2022-12-08 RX ORDER — TRAMADOL HYDROCHLORIDE 50 MG/1
50 TABLET ORAL EVERY 8 HOURS PRN
Qty: 90 TABLET | Refills: 2 | Status: SHIPPED | OUTPATIENT
Start: 2022-12-08 | End: 2023-03-09 | Stop reason: SDUPTHER

## 2022-12-08 RX ORDER — HYDROCHLOROTHIAZIDE 12.5 MG/1
12.5 TABLET ORAL DAILY
Qty: 90 TABLET | Refills: 1 | Status: SHIPPED | OUTPATIENT
Start: 2022-12-08 | End: 2023-03-09

## 2022-12-08 RX ORDER — NITROGLYCERIN 0.4 MG/1
0.4 TABLET SUBLINGUAL
Qty: 25 TABLET | Refills: 1 | Status: SHIPPED | OUTPATIENT
Start: 2022-12-08

## 2022-12-08 NOTE — PROGRESS NOTES
"The ABCs of the Annual Wellness Visit  Subsequent Medicare Wellness Visit    Also patient needs medication refills on all of her chronic comorbid conditions as well as fasting labs today    And over all reports the HTN and HLD and thyroid and gout all stable no SE no issues reported    Then with regards to her OA and then the CKD and then the diabetic neuropathy--pt reports stable on the lyrica and tramadol      PEG: A Three-Item Scale Assessing Pain Intensity and Interference  0 being no pain 10 pain as bad as you can imagine  1. What number best describes your pain on average in the past week? 5/10    2.  What number best describes how, during the past week, pain has interfered with your enjoyment of life? 0/10--\"my enjoyment is sitting in my chair and doing my games and my dog and eating out weekly and Rastafarian weekly\"    3.  What number best describes how, during the past week, pain has interfered with your general activity? 6/10    And patient shows no aberrant behaviors no signs and symptoms of misuse nor abuse and patient has chronic kidney disease stage III and is unable to take any nonsteroidal anti-inflammatories also patient remains on Plavix and sees cardiology regularly the cardiomyopathy     Chief Complaint   Patient presents with   • Medicare Wellness-subsequent      Subjective    History of Present Illness:  Nicole Holliday is a 82 y.o. female who presents for a Subsequent Medicare Wellness Visit.    The following portions of the patient's history were reviewed and   updated as appropriate: allergies, current medications, past family history, past medical history, past social history, past surgical history and problem list.    Compared to one year ago, the patient feels her physical   health is the same.    Compared to one year ago, the patient feels her mental   health is the same.    Recent Hospitalizations:  She was not admitted to the hospital during the last year.     Urine Drug Screen - Urine, " Clean Catch (06/16/2022 10:08)    Current Medical Providers:  Patient Care Team:  Brenda Son APRN as PCP - General (Nurse Practitioner)    Outpatient Medications Prior to Visit   Medication Sig Dispense Refill   • aspirin 81 MG EC tablet Aspir-81 81 mg oral tablet,delayed release (DR/EC) take 1 tablet (81 mg) by oral route once daily   Suspended     • Blood Glucose Monitoring Suppl (FreeStyle Lite) device 1 each Daily. 1 each 0   • JOHNSON PO Take  by mouth. Johnson gels at HS for the gout pain OTC     • Diclofenac Sodium (VOLTAREN) 1 % gel gel Apply 4 g topically to the appropriate area as directed 4 (Four) Times a Day As Needed (OA joint pain). 350 g 1   • sacubitril-valsartan (ENTRESTO) 24-26 MG tablet Take 1 tablet by mouth 2 (Two) Times a Day.     • allopurinol (ZYLOPRIM) 100 MG tablet Take 1 tablet by mouth Daily. 90 tablet 1   • atorvastatin (LIPITOR) 40 MG tablet Take 1 tablet by mouth Daily. 90 tablet 1   • clopidogrel (PLAVIX) 75 MG tablet Take 1 tablet by mouth Daily. 90 tablet 1   • glucose blood test strip 1 each by Other route Daily. Use as instructed 100 each 4   • hydroCHLOROthiazide (HYDRODIURIL) 12.5 MG tablet Take 1 tablet by mouth Daily. 90 tablet 1   • Lancets (freestyle) lancets 1 each by Other route Daily. Use as instructed 100 each 3   • levothyroxine (SYNTHROID, LEVOTHROID) 75 MCG tablet Take 1 tablet by mouth Every Morning. 90 tablet 1   • metoprolol succinate XL (TOPROL-XL) 50 MG 24 hr tablet Take 1 tablet by mouth Daily. 90 tablet 1   • nitroglycerin (NITROSTAT) 0.4 MG SL tablet Take 1 tablet by mouth Every 5 (Five) Minutes As Needed for Chest Pain. 25 tablet 1   • pantoprazole (PROTONIX) 40 MG EC tablet Take 1 tablet by mouth Daily. 90 tablet 1   • pregabalin (Lyrica) 75 MG capsule Take 1 capsule by mouth Every Night. 30 capsule 2   • traMADol (Ultram) 50 MG tablet Take 1 tablet by mouth Every 8 (Eight) Hours As Needed for Moderate Pain. 90 tablet 2     No facility-administered  medications prior to visit.       Opioid medication/s are on active medication list.  and I have evaluated her active treatment plan and pain score trends (see table).  Vitals:    12/08/22 0826   PainSc:   6     I have reviewed the chart for potential of high risk medication and harmful drug interactions in the elderly.            Aspirin is on active medication list. Aspirin use is indicated based on review of current medical condition/s. Pros and cons of this therapy have been discussed today. Benefits of this medication outweigh potential harm.  Patient has been encouraged to continue taking this medication.  .      Patient Active Problem List   Diagnosis   • Stage 3 chronic kidney disease (HCC)   • Polyarthropathy   • Peripheral nerve disease   • Hypothyroidism   • Hyperlipidemia   • Gastroesophageal reflux disease   • Diabetes mellitus, type II (HCC)   • Cardiomyopathy (HCC)   • Benign essential hypertension   • Automatic implantable cardiac defibrillator in situ   • Arteriosclerosis of coronary artery   • Allergic rhinitis   • Gout     Advance Care Planning  Advance Directive is on file.  ACP discussion was held with the patient during this visit. Patient has an advance directive in EMR which is still valid.     Review of Systems   Constitutional: Negative for activity change, appetite change and fatigue.        Down approx 6# in the past 15 months    HENT: Negative for trouble swallowing.    Eyes: Negative for visual disturbance.   Respiratory: Negative for choking and shortness of breath.    Cardiovascular: Negative for chest pain and leg swelling.   Gastrointestinal: Negative for abdominal pain and blood in stool.   Endocrine: Negative for polydipsia, polyphagia and polyuria.   Genitourinary: Negative for dysuria and vaginal bleeding.   Musculoskeletal: Positive for arthralgias and gait problem.        Back hands knees hips --multiple joints    Skin: Negative for rash and wound.   Neurological: Negative for  "dizziness, syncope and light-headedness.   Hematological: Bruises/bleeds easily.   Psychiatric/Behavioral: Negative.  Negative for suicidal ideas. The patient is not nervous/anxious.         Objective    Vitals:    12/08/22 0826   BP: 128/68   BP Location: Left arm   Patient Position: Sitting   Cuff Size: Adult   Pulse: 76   Temp: 98.1 °F (36.7 °C)   TempSrc: Temporal   SpO2: 97%   Weight: 77.6 kg (171 lb)   Height: 153.7 cm (60.5\")   PainSc:   6     Estimated body mass index is 32.85 kg/m² as calculated from the following:    Height as of this encounter: 153.7 cm (60.5\").    Weight as of this encounter: 77.6 kg (171 lb).    BMI is >= 30 and <35. (Class 1 Obesity). The following options were offered after discussion;: exercise counseling/recommendations and nutrition counseling/recommendations      Does the patient have evidence of cognitive impairment? No    Physical Exam  Vitals and nursing note reviewed.   Constitutional:       Appearance: Normal appearance.   HENT:      Head: Normocephalic.      Right Ear: External ear normal.      Left Ear: External ear normal.      Nose: Nose normal.      Mouth/Throat:      Comments: Wearing mask  Eyes:      Pupils: Pupils are equal, round, and reactive to light.   Cardiovascular:      Rate and Rhythm: Normal rate and regular rhythm.      Pulses:           Carotid pulses are 2+ on the right side and 2+ on the left side.     Heart sounds: Normal heart sounds.   Pulmonary:      Effort: Pulmonary effort is normal.      Breath sounds: Normal breath sounds.   Abdominal:      Palpations: Abdomen is soft.      Tenderness: There is no abdominal tenderness.   Musculoskeletal:      Cervical back: Normal range of motion and neck supple.      Thoracic back: Decreased range of motion.      Right knee: Decreased range of motion.      Left knee: Decreased range of motion.      Right lower leg: No edema.      Left lower leg: No edema.      Comments: Uses her cane to walk as she has decreased " sensation of the hands and feet    Feet:      Comments: (+) diabetic neuropathy of her feet --decreased sensation  Of her feet   Skin:     General: Skin is warm and dry.   Neurological:      Mental Status: She is alert and oriented to person, place, and time.   Psychiatric:         Mood and Affect: Mood normal.         Behavior: Behavior normal.         Thought Content: Thought content normal.         Judgment: Judgment normal.                 HEALTH RISK ASSESSMENT    Smoking Status:  Social History     Tobacco Use   Smoking Status Former   • Packs/day: 0.50   • Years: 5.00   • Pack years: 2.50   • Types: Cigarettes   Smokeless Tobacco Never     Alcohol Consumption:  Social History     Substance and Sexual Activity   Alcohol Use Never     Fall Risk Screen:    [facility] Fall Risk Assessment was completed, and patient is at LOW risk for falls.Assessment completed on:12/8/2022    Depression Screening:  PHQ-2/PHQ-9 Depression Screening 12/8/2022   Retired PHQ-9 Total Score -   Retired Total Score -   Little Interest or Pleasure in Doing Things 0-->not at all   Feeling Down, Depressed or Hopeless 0-->not at all   PHQ-9: Brief Depression Severity Measure Score 0       Health Habits and Functional and Cognitive Screening:  Functional & Cognitive Status 12/8/2022   Do you have difficulty preparing food and eating? No   Do you have difficulty bathing yourself, getting dressed or grooming yourself? No   Do you have difficulty using the toilet? No   Do you have difficulty moving around from place to place? No   Do you have trouble with steps or getting out of a bed or a chair? No   Current Diet Diabetic Diet   Dental Exam Other        Dental Exam Comment dentures   Eye Exam Up to date   Exercise (times per week) 3 times per week   Current Exercises Include Yard Work;Other;House Cleaning   Do you need help using the phone?  No   Are you deaf or do you have serious difficulty hearing?  No   Do you need help with transportation? No    Do you need help shopping? No   Do you need help preparing meals?  No   Do you need help with housework?  Yes   Do you need help with laundry? No   Do you need help taking your medications? No   Do you need help managing money? No   Do you ever drive or ride in a car without wearing a seat belt? No   Have you felt unusual stress, anger or loneliness in the last month? No   Who do you live with? Alone   If you need help, do you have trouble finding someone available to you? No   Have you been bothered in the last four weeks by sexual problems? No   Do you have difficulty concentrating, remembering or making decisions? No       Age-appropriate Screening Schedule:  Refer to the list below for future screening recommendations based on patient's age, sex and/or medical conditions. Orders for these recommended tests are listed in the plan section. The patient has been provided with a written plan.    Health Maintenance   Topic Date Due   • DXA SCAN  01/14/2022   • TDAP/TD VACCINES (1 - Tdap) 12/08/2023 (Originally 10/14/1959)   • ZOSTER VACCINE (1 of 2) 12/08/2023 (Originally 10/14/1992)   • HEMOGLOBIN A1C  12/16/2022   • DIABETIC EYE EXAM  04/07/2023   • LIPID PANEL  06/16/2023   • URINE MICROALBUMIN  06/16/2023   • INFLUENZA VACCINE  Completed              Assessment & Plan   CMS Preventative Services Quick Reference  Risk Factors Identified During Encounter  Chronic Pain: Follow up in 3 month.  overall stable and F/U Q 3 months   Immunizations Discussed/Encouraged: Prevnar 20 (Pneumococcal 20-valent conjugate) and Shingrix  The above risks/problems have been discussed with the patient.  Follow up actions/plans if indicated are seen below in the Assessment/Plan Section.  Pertinent information has been shared with the patient in the After Visit Summary.    Diagnoses and all orders for this visit:    1. Medicare annual wellness visit, subsequent (Primary)    2. Benign essential hypertension  -     hydroCHLOROthiazide  (HYDRODIURIL) 12.5 MG tablet; Take 1 tablet by mouth Daily.  Dispense: 90 tablet; Refill: 1  -     levothyroxine (SYNTHROID, LEVOTHROID) 75 MCG tablet; Take 1 tablet by mouth Every Morning.  Dispense: 90 tablet; Refill: 1  -     metoprolol succinate XL (TOPROL-XL) 50 MG 24 hr tablet; Take 1 tablet by mouth Daily.  Dispense: 90 tablet; Refill: 1  -     Urinalysis With Culture If Indicated - Urine, Clean Catch  -     CBC & Differential  -     Comprehensive Metabolic Panel  -     Hemoglobin A1c  -     Lipid Panel  -     TSH Rfx On Abnormal To Free T4    3. Hypothyroidism, unspecified type  -     levothyroxine (SYNTHROID, LEVOTHROID) 75 MCG tablet; Take 1 tablet by mouth Every Morning.  Dispense: 90 tablet; Refill: 1    4. Cardiomyopathy, unspecified type (HCC)  -     metoprolol succinate XL (TOPROL-XL) 50 MG 24 hr tablet; Take 1 tablet by mouth Daily.  Dispense: 90 tablet; Refill: 1  -     clopidogrel (PLAVIX) 75 MG tablet; Take 1 tablet by mouth Daily.  Dispense: 90 tablet; Refill: 1  -     nitroglycerin (NITROSTAT) 0.4 MG SL tablet; Take 1 tablet by mouth Every 5 (Five) Minutes As Needed for Chest Pain.  Dispense: 25 tablet; Refill: 1    5. Mixed hyperlipidemia  -     atorvastatin (LIPITOR) 40 MG tablet; Take 1 tablet by mouth Daily.  Dispense: 90 tablet; Refill: 1    6. Chronic gout without tophus, unspecified cause, unspecified site  -     allopurinol (ZYLOPRIM) 100 MG tablet; Take 1 tablet by mouth Daily.  Dispense: 90 tablet; Refill: 1    7. Gastroesophageal reflux disease without esophagitis  -     pantoprazole (PROTONIX) 40 MG EC tablet; Take 1 tablet by mouth Daily.  Dispense: 90 tablet; Refill: 1    8. Stage 3b chronic kidney disease (HCC)  -     traMADol (Ultram) 50 MG tablet; Take 1 tablet by mouth Every 8 (Eight) Hours As Needed for Moderate Pain.  Dispense: 90 tablet; Refill: 2  -     pregabalin (Lyrica) 75 MG capsule; Take 1 capsule by mouth Every Night.  Dispense: 30 capsule; Refill: 2    9. Peripheral  nerve disease  -     traMADol (Ultram) 50 MG tablet; Take 1 tablet by mouth Every 8 (Eight) Hours As Needed for Moderate Pain.  Dispense: 90 tablet; Refill: 2  -     pregabalin (Lyrica) 75 MG capsule; Take 1 capsule by mouth Every Night.  Dispense: 30 capsule; Refill: 2    10. Polyarthropathy  -     traMADol (Ultram) 50 MG tablet; Take 1 tablet by mouth Every 8 (Eight) Hours As Needed for Moderate Pain.  Dispense: 90 tablet; Refill: 2    11. Diet-controlled diabetes mellitus (HCC)  -     glucose blood test strip; 1 each by Other route Daily. Use as instructed  Dispense: 100 each; Refill: 4  -     Lancets (freestyle) lancets; 1 each by Other route Daily. Use as instructed  Dispense: 100 each; Refill: 3  -     Urinalysis With Culture If Indicated - Urine, Clean Catch  -     CBC & Differential  -     Comprehensive Metabolic Panel  -     Hemoglobin A1c  -     Lipid Panel  -     TSH Rfx On Abnormal To Free T4    12. Localized osteoporosis without current pathological fracture  -     DEXA Bone Density Axial; Future    13. Vaccine counseling  -     Pneumococcal Conjugate Vaccine 20-Valent (PCV20)    14. Encounter for immunization  -     Pneumococcal Conjugate Vaccine 20-Valent (PCV20)    fax all labs to Dr Luis     Follow Up:   Return in about 3 months (around 3/8/2023), or if symptoms worsen or fail to improve, for Recheck.     An After Visit Summary and PPPS were made available to the patient.

## 2022-12-08 NOTE — PROGRESS NOTES
Venipuncture Blood Specimen Collection  Venipuncture performed in left arm  by Lupe White with good hemostasis. Patient tolerated the procedure well without complications.   12/08/22   Lupe White

## 2022-12-09 LAB — BACTERIA SPEC AEROBE CULT: NORMAL

## 2022-12-09 NOTE — PROGRESS NOTES
Please mail letter to patient stating    Effie urinalysis shows that your spilling 3+ glucose and there was 1+ protein and small amount of leukocytes and I have not received the microscopic view yet so in the meantime please stay well-hydrated    And then the thyroid levels were normal range; and then the comprehensive panel shows that your fasting glucose was 108 and the liver function tests were normal     and your electrolytes were all normal with the exception of your calcium at 10.7 and it should not be any higher than 10.5--so if you are taking any supplements with calcium in them or eating a lot of cheese or drinking a lot of dairy products like milk the you may need to cut back on that and limit this and then let me repeat the calcium level and the parathyroid hormone level as well and then we can make sure this is normalized then just stay well-hydrated please; and then also your BUN and creatinine were slightly elevated showing slight dehydration as well as your filtration rates were very stable in chronic kidney disease stage III in fact have actually improved the filtration rates from 5 months ago    Cholesterol panel was all normal with the exception of a slightly decreased HDL at 37 and it should be 40 or higher and your hemoglobin A1c was very stable at 6% showing very good diet-controlled diabetes; and your blood counts were normal range

## 2022-12-12 NOTE — PROGRESS NOTES
Please mail letter to patient stating    Effie the finalized urine culture just shows mixed kendra so if you are having any persistent symptoms of a bladder infection or urinary tract infection please let me know and I can drop in the orders to repeat the urine tests

## 2023-01-11 ENCOUNTER — HOSPITAL ENCOUNTER (OUTPATIENT)
Dept: BONE DENSITY | Facility: HOSPITAL | Age: 83
Discharge: HOME | End: 2023-01-11
Admitting: NURSE PRACTITIONER
Payer: MEDICARE

## 2023-01-11 DIAGNOSIS — M81.6 LOCALIZED OSTEOPOROSIS WITHOUT CURRENT PATHOLOGICAL FRACTURE: ICD-10-CM

## 2023-01-11 PROCEDURE — 77080 DXA BONE DENSITY AXIAL: CPT

## 2023-01-12 NOTE — PROGRESS NOTES
Please mail letter to patient stating    Effie your bone density shows osteoporosis and shows that it is a little bit worse than the last time you had one--and I know you tried Boniva in the past and you had severe nausea and vomiting--and I do not know if you have ever successfully tried anything else or not like even the Prolia injection at the infusion center at the hospitals every 6 months or Fosamax once weekly--but you can let me know if you are wanting to try something else in an attempt to prevent the osteoporosis from getting worse--in the meantime make sure you are taking calcium with vitamin D on a daily basis through supplement and diet with your calcium equaling approximately 12 100-15 100 and then just try to stay as active as you can because weightbearing exercises like walking helps strengthen the bones
No

## 2023-02-09 ENCOUNTER — TELEPHONE (OUTPATIENT)
Dept: FAMILY MEDICINE CLINIC | Facility: CLINIC | Age: 83
End: 2023-02-09
Payer: OTHER GOVERNMENT

## 2023-02-09 NOTE — TELEPHONE ENCOUNTER
Caller: Nicole Holliday    Relationship to patient: Self    Best call back number: [phone]    Patient is needing: PATIENT CALLED IN AND SAID SHE NEEDS A NOTE MAILED TO HER HOUSE STATING THAT SHE HAS ENOUGH HEALTH ISSUES TO QUALIFY TO HAVE A MAILBOX BY HER HOME INSTEAD OF NEAR THE ROAD. PATIENT STATES THAT THEY WON'T ALLOW HER TO HAVE A MAILBOX BY HER HOUSE UNLESS SHE HAS THAT LETTER. PATIENT WOULD LIKE IT MAILED TO HER HOUSE PLEASE    [id number] [facility] [id number]

## 2023-02-13 ENCOUNTER — OFFICE VISIT (OUTPATIENT)
Dept: FAMILY MEDICINE CLINIC | Facility: CLINIC | Age: 83
End: 2023-02-13
Payer: MEDICARE

## 2023-02-13 VITALS
SYSTOLIC BLOOD PRESSURE: 136 MMHG | TEMPERATURE: 97.8 F | DIASTOLIC BLOOD PRESSURE: 84 MMHG | WEIGHT: 173.5 LBS | OXYGEN SATURATION: 97 % | HEIGHT: 61 IN | HEART RATE: 84 BPM | BODY MASS INDEX: 32.76 KG/M2

## 2023-02-13 DIAGNOSIS — M25.561 PAIN AND SWELLING OF KNEE, RIGHT: ICD-10-CM

## 2023-02-13 DIAGNOSIS — M25.461 PAIN AND SWELLING OF KNEE, RIGHT: ICD-10-CM
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
## 2023-02-13 DIAGNOSIS — M17.11 PRIMARY OSTEOARTHRITIS OF RIGHT KNEE: ICD-10-CM

## 2023-02-13 PROCEDURE — 99213 OFFICE O/P EST LOW 20 MIN: CPT | Performed by: NURSE PRACTITIONER

## 2023-02-13 NOTE — PROGRESS NOTES
"Chief Complaint  Knee Injury (Pt states she fell yesterday and hurt her right knee, pt has a bruise on right breast)    Subjective            Ada FÁTIMA Holliday presents to [facility]  History of Present Illness     Patient comes into the office today secondary to fall at home.  She states that she tripped over a dog toy last night.  She fell on her \"good knee\" which is her right knee.  She has some pain in the knee, which is currently controlled with her regular dose of tramadol, but she is having a fair amount of swelling and some bruising.  She fell holding a tea cup and that did hit her in the right rib, but she denies any significant pain in the rib area.  No cough, wheeze, or shortness of breath.  She just has a small bruise at this time.    PHQ-2 Total Score: 0    Past Medical History:   Diagnosis Date   • Benign essential hypertension    • CKD (chronic kidney disease)    • GERD (gastroesophageal reflux disease)    • Gout    • HLD (hyperlipidemia)    • Hypothyroidism        Allergies   Allergen Reactions   • Contrast Dye (Echo Or Unknown Ct/Mr) Unknown - High Severity     Category: IV Contrast Dyes;      • Penicillins Rash   • Ibandronic Acid Nausea Only and Unknown - High Severity        Past Surgical History:   Procedure Laterality Date   • CHOLECYSTECTOMY     • HYSTERECTOMY          Social History     Tobacco Use   • Smoking status: Former     Packs/day: 0.50     Years: 5.00     Pack years: 2.50     Types: Cigarettes   • Smokeless tobacco: Never   Vaping Use   • Vaping Use: Never used   Substance Use Topics   • Alcohol use: Never   • Drug use: Never       Family History   Problem Relation Age of Onset   • Arthritis Father    • Colon cancer Father    • Colon cancer Brother         There are no preventive care reminders to display for this patient.     Current Outpatient Medications on File Prior to Visit   Medication Sig   • allopurinol (ZYLOPRIM) 100 MG tablet Take 1 tablet " by mouth Daily.   • aspirin 81 MG EC tablet Aspir-81 81 mg oral tablet,delayed release (DR/EC) take 1 tablet (81 mg) by oral route once daily   Suspended   • atorvastatin (LIPITOR) 40 MG tablet Take 1 tablet by mouth Daily.   • Blood Glucose Monitoring Suppl (FreeStyle Lite) device 1 each Daily.   • JOHNSON PO Take  by mouth. Johnson gels at HS for the gout pain OTC   • clopidogrel (PLAVIX) 75 MG tablet Take 1 tablet by mouth Daily.   • Diclofenac Sodium (VOLTAREN) 1 % gel gel Apply 4 g topically to the appropriate area as directed 4 (Four) Times a Day As Needed (OA joint pain).   • glucose blood test strip 1 each by Other route Daily. Use as instructed   • hydroCHLOROthiazide (HYDRODIURIL) 12.5 MG tablet Take 1 tablet by mouth Daily.   • Lancets (freestyle) lancets 1 each by Other route Daily. Use as instructed   • levothyroxine (SYNTHROID, LEVOTHROID) 75 MCG tablet Take 1 tablet by mouth Every Morning.   • metoprolol succinate XL (TOPROL-XL) 50 MG 24 hr tablet Take 1 tablet by mouth Daily.   • nitroglycerin (NITROSTAT) 0.4 MG SL tablet Take 1 tablet by mouth Every 5 (Five) Minutes As Needed for Chest Pain.   • pantoprazole (PROTONIX) 40 MG EC tablet Take 1 tablet by mouth Daily.   • pregabalin (Lyrica) 75 MG capsule Take 1 capsule by mouth Every Night.   • sacubitril-valsartan (ENTRESTO) 24-26 MG tablet Take 1 tablet by mouth 2 (Two) Times a Day.   • traMADol (Ultram) 50 MG tablet Take 1 tablet by mouth Every 8 (Eight) Hours As Needed for Moderate Pain.     No current facility-administered medications on file prior to visit.       Immunization History   Administered Date(s) Administered   • COVID-19 (MODERNA) 1st, 2nd, 3rd Dose Only 01/21/2021, 02/23/2021, 11/03/2021   • COVID-19 (MODERNA) BIVALENT BOOSTER 12+YRS 10/06/2022   • FLUAD TRI 65YR+ 10/26/2017   • Fluzone High Dose =>65 Years (Vaxcare ONLY) 10/27/2014, 10/28/2015, 10/26/2016   • Fluzone High-Dose 65+yrs 09/27/2021, 09/28/2022   • Hepatitis A 10/10/2018,  "09/08/2020   • Influenza, Unspecified 10/10/2018, 09/30/2019   • Pneumococcal Conjugate 20-Valent (PCV20) 12/08/2022       Review of Systems     Objective     /84 (BP Location: Right arm, Patient Position: Sitting, Cuff Size: Infant)   Pulse 84   Temp 97.8 °F (36.6 °C) (Temporal)   Ht 153.7 cm (60.5\")   Wt 78.7 kg (173 lb 8 oz)   SpO2 97%   BMI 33.33 kg/m²       Physical Exam  Vitals reviewed.   Constitutional:       General: She is not in acute distress.     Appearance: She is well-developed. She is obese.   HENT:      Head: Normocephalic and atraumatic.   Eyes:      General: No scleral icterus.     Extraocular Movements: Extraocular movements intact.      Conjunctiva/sclera: Conjunctivae normal.   Cardiovascular:      Rate and Rhythm: Normal rate and regular rhythm.      Pulses: Normal pulses.      Heart sounds: No murmur heard.  Pulmonary:      Effort: Pulmonary effort is normal. No respiratory distress.      Breath sounds: Normal breath sounds. No wheezing, rhonchi or rales.   Chest:      Chest wall: No mass, deformity, swelling, tenderness or crepitus.   Musculoskeletal:      Cervical back: Normal range of motion.      Right knee: Swelling, effusion, ecchymosis, bony tenderness and crepitus present. No deformity. Decreased range of motion. Tenderness present. No lateral joint line tenderness.      Comments: She has significant swelling and ecchymosis with tenderness in the upper medial quadrant of the right knee.   Skin:     General: Skin is warm and dry.   Neurological:      Mental Status: She is alert and oriented to person, place, and time.   Psychiatric:         Mood and Affect: Mood and affect normal.         Behavior: Behavior normal.         Thought Content: Thought content normal.         Judgment: Judgment normal.         Result Review :     The following data was reviewed by: JHON Joiner on 02/13/2023:    CMP    CMP 6/16/22 12/8/22   Glucose 111 (A) 108 (A)   BUN 30 (A) 27 " "(A)   Creatinine 1.40 (A) 1.30 (A)   eGFR 38.3 (A) 41.7 (A)   Sodium 141 142   Potassium 4.0 4.1   Chloride 103 105   Calcium 10.1 10.7 (A)   Total Protein 7.3 7.6   Albumin 4.20 4.20   Globulin 3.1 3.4   Total Bilirubin 0.7 0.7   Alkaline Phosphatase 90 99   AST (SGOT) 29 29   ALT (SGPT) 19 18   Albumin/Globulin Ratio 1.4 1.2   BUN/Creatinine Ratio 21.4 20.8   Anion Gap 14.3 10.1   (A) Abnormal value       Comments are available for some flowsheets but are not being displayed.             Data reviewed: Radiologic studies :   XR Knee 1 or 2 View Right (In Office) (02/13/2023 09:13)           Assessment and Plan      Diagnoses and all orders for this visit:    1. Fall at home, initial encounter (Primary)  -     XR Knee 1 or 2 View Right (In Office)    2. Pain and swelling of knee, right  -     XR Knee 1 or 2 View Right (In Office)    3. Primary osteoarthritis of right knee            Follow Up     Return if symptoms worsen or fail to improve.     X-ray of the knee was not read by radiology at the time of her appointment; however, I did discuss findings of significant osteoarthritis, as well as probable osteopenia.  The patient was put in a knee sleeve for support.  Declined a knee brace at the time of visit due to this being her \"good knee\".  She and the family were concerned with mobility restriction related to a knee brace.  Advised that she needed to rest, elevate, and ice the right knee.  Avoid being weightbearing until we hear further regarding the x-ray.  We will contact her once that has been read.    Addendum: no fracture or traumatic malalignment per radiology - results called to patient. Patient advised to follow up in the office if no improvement with conservative measurements.     Patient was given instructions and counseling regarding her condition or for health maintenance advice. Please see specific information pulled into the AVS if appropriate.     "

## 2023-02-21 ENCOUNTER — OFFICE VISIT (OUTPATIENT)
Dept: FAMILY MEDICINE CLINIC | Facility: CLINIC | Age: 83
End: 2023-02-21
Payer: MEDICARE

## 2023-02-21 VITALS
BODY MASS INDEX: 32.47 KG/M2 | SYSTOLIC BLOOD PRESSURE: 138 MMHG | TEMPERATURE: 96.7 F | HEART RATE: 76 BPM | OXYGEN SATURATION: 98 % | WEIGHT: 172 LBS | HEIGHT: 61 IN | DIASTOLIC BLOOD PRESSURE: 84 MMHG

## 2023-02-21 DIAGNOSIS — L03.115 CELLULITIS OF RIGHT LOWER EXTREMITY: ICD-10-CM

## 2023-02-21 DIAGNOSIS — M25.561 PAIN AND SWELLING OF RIGHT KNEE: Primary | ICD-10-CM

## 2023-02-21 DIAGNOSIS — Z91.81 HISTORY OF FALL: ICD-10-CM

## 2023-02-21 DIAGNOSIS — M25.561 ACUTE PAIN OF RIGHT KNEE: ICD-10-CM

## 2023-02-21 DIAGNOSIS — T14.8XXA BRUISING: ICD-10-CM

## 2023-02-21 DIAGNOSIS — M25.461 PAIN AND SWELLING OF RIGHT KNEE: Primary | ICD-10-CM

## 2023-02-21 PROCEDURE — 99214 OFFICE O/P EST MOD 30 MIN: CPT | Performed by: NURSE PRACTITIONER

## 2023-02-21 RX ORDER — DOXYCYCLINE HYCLATE 100 MG/1
100 CAPSULE ORAL 2 TIMES DAILY
Qty: 20 CAPSULE | Refills: 0 | Status: SHIPPED | OUTPATIENT
Start: 2023-02-21 | End: 2023-03-09

## 2023-02-21 NOTE — PROGRESS NOTES
Chief Complaint  Knee Injury (A week a go Sunday night she fell on Right Knee.  She has hurt her right knee and right ankle.  )    Subjective            Ada FÁTIMA Holliday presents to [facility]  History of Present Illness  Pt here for the F/U on the right knee and right ankle injury--2/12/23--and pt fell at home and was seen here acutely on 2/13/23 and they did xrays and was (-) for anything dislocated or fractured or effusion     Then over the weekend started with swelling and more pronouced bruising and more pain and then redness and heat to the knee and then also the bruising came out more to the right ankle as well        PHQ-2 Total Score:    PHQ-9 Total Score:      Past Medical History:   Diagnosis Date   • Benign essential hypertension    • CKD (chronic kidney disease)    • GERD (gastroesophageal reflux disease)    • Gout    • HLD (hyperlipidemia)    • Hypothyroidism        Allergies   Allergen Reactions   • Contrast Dye (Echo Or Unknown Ct/Mr) Unknown - High Severity     Category: IV Contrast Dyes;      • Penicillins Rash   • Ibandronic Acid Nausea Only and Unknown - High Severity        Past Surgical History:   Procedure Laterality Date   • CHOLECYSTECTOMY     • HYSTERECTOMY          Social History     Tobacco Use   • Smoking status: Former     Packs/day: 0.50     Years: 5.00     Pack years: 2.50     Types: Cigarettes   • Smokeless tobacco: Never   Vaping Use   • Vaping Use: Never used   Substance Use Topics   • Alcohol use: Never   • Drug use: Never       Family History   Problem Relation Age of Onset   • Arthritis Father    • Colon cancer Father    • Colon cancer Brother         There are no preventive care reminders to display for this patient.     Current Outpatient Medications on File Prior to Visit   Medication Sig   • allopurinol (ZYLOPRIM) 100 MG tablet Take 1 tablet by mouth Daily.   • aspirin 81 MG EC tablet Aspir-81 81 mg oral tablet,delayed release (DR/EC) take 1  tablet (81 mg) by oral route once daily   Suspended   • atorvastatin (LIPITOR) 40 MG tablet Take 1 tablet by mouth Daily.   • Blood Glucose Monitoring Suppl (FreeStyle Lite) device 1 each Daily.   • JOHNSON PO Take  by mouth. Johnson gels at HS for the gout pain OTC   • clopidogrel (PLAVIX) 75 MG tablet Take 1 tablet by mouth Daily.   • Diclofenac Sodium (VOLTAREN) 1 % gel gel Apply 4 g topically to the appropriate area as directed 4 (Four) Times a Day As Needed (OA joint pain).   • glucose blood test strip 1 each by Other route Daily. Use as instructed   • hydroCHLOROthiazide (HYDRODIURIL) 12.5 MG tablet Take 1 tablet by mouth Daily.   • Lancets (freestyle) lancets 1 each by Other route Daily. Use as instructed   • levothyroxine (SYNTHROID, LEVOTHROID) 75 MCG tablet Take 1 tablet by mouth Every Morning.   • metoprolol succinate XL (TOPROL-XL) 50 MG 24 hr tablet Take 1 tablet by mouth Daily.   • nitroglycerin (NITROSTAT) 0.4 MG SL tablet Take 1 tablet by mouth Every 5 (Five) Minutes As Needed for Chest Pain.   • pantoprazole (PROTONIX) 40 MG EC tablet Take 1 tablet by mouth Daily.   • pregabalin (Lyrica) 75 MG capsule Take 1 capsule by mouth Every Night.   • sacubitril-valsartan (ENTRESTO) 24-26 MG tablet Take 1 tablet by mouth 2 (Two) Times a Day.   • traMADol (Ultram) 50 MG tablet Take 1 tablet by mouth Every 8 (Eight) Hours As Needed for Moderate Pain.     No current facility-administered medications on file prior to visit.       Immunization History   Administered Date(s) Administered   • COVID-19 (MODERNA) 1st, 2nd, 3rd Dose Only 01/21/2021, 02/23/2021, 11/03/2021   • COVID-19 (MODERNA) BIVALENT BOOSTER 12+YRS 10/06/2022   • FLUAD TRI 65YR+ 10/26/2017   • Fluzone High Dose =>65 Years (Vaxcare ONLY) 10/27/2014, 10/28/2015, 10/26/2016   • Fluzone High-Dose 65+yrs 09/27/2021, 09/28/2022   • Hepatitis A 10/10/2018, 09/08/2020   • Influenza, Unspecified 10/10/2018, 09/30/2019   • Pneumococcal Conjugate 20-Valent  "(PCV20) 12/08/2022       Review of Systems   Constitutional: Negative for chills and fever.   Respiratory: Negative for shortness of breath.    Cardiovascular: Negative for chest pain.   Musculoskeletal: Positive for arthralgias, gait problem and joint swelling.        Objective     /84 (BP Location: Left arm, Patient Position: Sitting, Cuff Size: Adult)   Pulse 76   Temp 96.7 °F (35.9 °C) (Temporal)   Ht 153.7 cm (60.5\")   Wt 78 kg (172 lb)   SpO2 98%   BMI 33.04 kg/m²       Physical Exam  Vitals and nursing note reviewed. Exam conducted with a chaperone present (granddaughter).   Constitutional:       Appearance: Normal appearance.   HENT:      Head: Normocephalic.      Right Ear: External ear normal.      Left Ear: External ear normal.      Nose: Nose normal.      Mouth/Throat:      Comments: Wearing mask  Eyes:      Pupils: Pupils are equal, round, and reactive to light.   Cardiovascular:      Rate and Rhythm: Normal rate.   Pulmonary:      Effort: Pulmonary effort is normal.   Musculoskeletal:         General: Swelling, tenderness and signs of injury present.      Cervical back: Normal range of motion.      Right knee: Swelling, erythema, ecchymosis and bony tenderness present. Decreased range of motion. Tenderness present.   Skin:     General: Skin is warm and dry.   Neurological:      Mental Status: She is alert and oriented to person, place, and time.   Psychiatric:         Mood and Affect: Mood normal.         Behavior: Behavior normal.         Thought Content: Thought content normal.         Judgment: Judgment normal.         Result Review :           XR Knee 1 or 2 View Right (In Office) (02/13/2023 09:19)  XR Knee 1 or 2 View Right (In Office) (02/21/2023 13:45)    FINDINGS:          Irregularity of the articular surface and the medial compartment suggest sequela of prior   osteochondral injury and ossification of the defect.  Tricompartmental osteoarthrosis is noted.    Peripheral vascular " calcifications noted.  Prepatellar soft tissue swelling is similar to the prior   exam.  No joint effusion noted     IMPRESSION:                 1. Persistent prepatellar soft tissue swelling compatible with contusion.  No significant joint   effusion noted.  2. Tricompartmental arthrosis with no definite acute osseous abnormality.  If there is clinical   concern for occult injury CT or MRI could always be considered             Assessment and Plan      Diagnoses and all orders for this visit:    1. Pain and swelling of right knee (Primary)  -     XR Knee 1 or 2 View Right (In Office)  -     doxycycline (VIBRAMYCIN) 100 MG capsule; Take 1 capsule by mouth 2 (Two) Times a Day.  Dispense: 20 capsule; Refill: 0  -     Ambulatory Referral to Orthopedic Surgery    2. Acute pain of right knee  -     XR Knee 1 or 2 View Right (In Office)  -     Ambulatory Referral to Orthopedic Surgery    3. History of fall  -     XR Knee 1 or 2 View Right (In Office)  -     Ambulatory Referral to Orthopedic Surgery    4. Bruising  -     XR Knee 1 or 2 View Right (In Office)  -     Ambulatory Referral to Orthopedic Surgery    5. Cellulitis of right lower extremity  -     doxycycline (VIBRAMYCIN) 100 MG capsule; Take 1 capsule by mouth 2 (Two) Times a Day.  Dispense: 20 capsule; Refill: 0    We will start doxycycline empirically for the cellulitis that is evident in the knee with the effusion and get her referred urgently to orthopedic specialist for evaluation and possible removal of the fluid-effusion of the joint        Follow Up     Return if symptoms worsen or fail to improve.

## 2023-02-21 NOTE — PROGRESS NOTES
Please call and let Effie know that it just shows the arthritis again and persistent soft tissue swelling compatible with contusion but no joint effusion is noted and no fracture evident thank you

## 2023-02-23 ENCOUNTER — OFFICE VISIT (OUTPATIENT)
Dept: ORTHOPEDIC SURGERY | Facility: CLINIC | Age: 83
End: 2023-02-23
Payer: MEDICARE

## 2023-02-23 VITALS — WEIGHT: 170 LBS | HEART RATE: 74 BPM | HEIGHT: 62 IN | BODY MASS INDEX: 31.28 KG/M2 | OXYGEN SATURATION: 96 %

## 2023-02-23 DIAGNOSIS — I70.90 ATHEROSCLEROSIS: ICD-10-CM

## 2023-02-23 DIAGNOSIS — M17.11 PRIMARY OSTEOARTHRITIS OF RIGHT KNEE: Primary | ICD-10-CM

## 2023-02-23 DIAGNOSIS — M25.569 PAIN OF KNEE JOINT WITH OSTEOCHONDRAL INJURY: ICD-10-CM

## 2023-02-23 PROCEDURE — 99203 OFFICE O/P NEW LOW 30 MIN: CPT | Performed by: ORTHOPAEDIC SURGERY

## 2023-02-23 NOTE — PROGRESS NOTES
"Chief Complaint  Initial Evaluation of the Right Knee     Subjective      Nicole Holliday presents to [facility] for evaluation of the right knee. The patient had a fall on 2/12/23 and was evaluated with x-rays on 2/13/23 with x-rays that were negative. She had repeat x-rays on 2/21/22. She reports pain, swelling, and redness. She takes plavix. She is here with her granddaughter. She is ambulating with a cane. She is on an antibiotic that she she started Tuesday.     Allergies   Allergen Reactions   • Contrast Dye (Echo Or Unknown Ct/Mr) Unknown - High Severity     Category: IV Contrast Dyes;      • Penicillins Rash   • Ibandronic Acid Nausea Only and Unknown - High Severity        Social History     Socioeconomic History   • Marital status:    Tobacco Use   • Smoking status: Former     Packs/day: 0.50     Years: 5.00     Pack years: 2.50     Types: Cigarettes   • Smokeless tobacco: Never   Vaping Use   • Vaping Use: Never used   Substance and Sexual Activity   • Alcohol use: Never   • Drug use: Never   • Sexual activity: Not Currently        Review of Systems     Objective   Vital Signs:   Pulse 74   Ht 157.5 cm (62\")   Wt 77.1 kg (170 lb)   SpO2 96%   BMI 31.09 kg/m²       Physical Exam  Constitutional:       Appearance: Normal appearance. The patient is well-developed and normal weight.   HENT:      Head: Normocephalic.      Right Ear: Hearing and external ear normal.      Left Ear: Hearing and external ear normal.      Nose: Nose normal.   Eyes:      Conjunctiva/sclera: Conjunctivae normal.   Cardiovascular:      Rate and Rhythm: Normal rate.   Pulmonary:      Effort: Pulmonary effort is normal.      Breath sounds: No wheezing or rales.   Abdominal:      Palpations: Abdomen is soft.      Tenderness: There is no abdominal tenderness.   Musculoskeletal:      Cervical back: Normal range of motion.   Skin:     Findings: No rash.   Neurological:      Mental Status: The patient " is alert and oriented to person, place, and time.   Psychiatric:         Mood and Affect: Mood and affect normal.         Judgment: Judgment normal.       Ortho Exam      Right knee- flexion 90. Extension -10. Swelling and bruising with redness to the anterior knee. Mild warmth. Soft tissue swelling to the anterior knee. Small healing abrasion to the anterior knee. Swelling and bruising to the right ankle. Ankle ROM intact.     Procedures      Imaging Results (Most Recent)     None           Result Review :       XR Knee 1 or 2 View Right (In Office)    Result Date: 2/21/2023  Narrative: PROCEDURE: XR KNEE 1 OR 2 VW RIGHT  COMPARISON: [facility], , XR KNEE 1 OR 2 VW RIGHT, 2/13/2023, 9:21.  INDICATIONS: fell on the 12th and xrayed the 13th then over weekend swelling pain and bruising heat and redness  FINDINGS:  Irregularity of the articular surface and the medial compartment suggest sequela of prior osteochondral injury and ossification of the defect.  Tricompartmental osteoarthrosis is noted.  Peripheral vascular calcifications noted.  Prepatellar soft tissue swelling is similar to the prior exam.  No joint effusion noted      Impression:   1. Persistent prepatellar soft tissue swelling compatible with contusion.  No significant joint effusion noted. 2. Tricompartmental arthrosis with no definite acute osseous abnormality.  If there is clinical concern for occult injury CT or MRI could always be considered      ARACELY BANKS MD       Electronically Signed and Approved By: ARACELY BANKS MD on 2/21/2023 at 14:24             XR Knee 1 or 2 View Right (In Office)    Result Date: 2/13/2023  Narrative: PROCEDURE: XR KNEE 1 OR 2 VW RIGHT  COMPARISON: None  INDICATIONS: acute right knee pain with swelling/bruising - fall at home last night  FINDINGS:  No acute fracture is identified.  No focal osseous abnormality is identified.  There is moderate tricompartmental osteoarthritis.  No suprapatellar  joint effusion is identified.  Prominent vascular calcification is noted.      Impression:  No acute fracture or traumatic malalignment identified.      DAMIAN MARLEY MD       Electronically Signed and Approved By: DAMIAN MARLEY MD on 2/13/2023 at 12:24                      Assessment and Plan     Diagnoses and all orders for this visit:    1. Primary osteoarthritis of right knee (Primary)    2. Pain of knee joint with osteochondral injury    3. Atherosclerosis        Discussed the treatment plan with the patient.  I reviewed the x-rays that were obtained previously with the patient. I discussed conservative treatment at this time. She was placed into an ACE wrap today. Plan to finish antibiotic.     Call or return if worsening symptoms.    Follow Up     1 week      Patient was given instructions and counseling regarding her condition or for health maintenance advice. Please see specific information pulled into the AVS if appropriate.     Scribed for Hao Lakrin MD by Vanessa Bryant.  02/23/23   14:31 EST    I have personally performed the services described in this document as scribed by the above individual and it is both accurate and complete. Hao Larkin MD 02/24/23

## 2023-03-02 ENCOUNTER — OFFICE VISIT (OUTPATIENT)
Dept: ORTHOPEDIC SURGERY | Facility: CLINIC | Age: 83
End: 2023-03-02
Payer: MEDICARE

## 2023-03-02 VITALS — WEIGHT: 169 LBS | OXYGEN SATURATION: 95 % | HEART RATE: 71 BPM | HEIGHT: 62 IN | BODY MASS INDEX: 31.1 KG/M2

## 2023-03-02 DIAGNOSIS — M17.11 PRIMARY OSTEOARTHRITIS OF RIGHT KNEE: Primary | ICD-10-CM

## 2023-03-02 DIAGNOSIS — M25.569 PAIN OF KNEE JOINT WITH OSTEOCHONDRAL INJURY: ICD-10-CM

## 2023-03-02 PROCEDURE — 99213 OFFICE O/P EST LOW 20 MIN: CPT | Performed by: PHYSICIAN ASSISTANT

## 2023-03-02 NOTE — PROGRESS NOTES
"Chief Complaint  Follow-up and Pain of the Right Knee    Subjective          History of Present Illness      Nicole Holliday is a 82 y.o. female  presents to [facility] for     Patient presents with her granddaughter Hortencia.  Patient and granddaughter were seen with Dr. Larkin on 2/23/2023.  Patient had original injury on 2/12/2023.  Dr. Larkin treated the patient at last visit, she was taking antibiotic for cellulitis, she has 1 more day of this.  Patient and granddaughter state that the patient is much better she states she has \"no pain\".  They state her range of motion is better she is able to climb in and out of trucks, cars and go up and down stairs without pain.  She denies new injury or symptoms of pain she presents with a cane and a Ace wrap on her knee, she denies fever/chills, they both are satisfied with her progress.      Allergies   Allergen Reactions   • Contrast Dye (Echo Or Unknown Ct/Mr) Unknown - High Severity     Category: IV Contrast Dyes;      • Penicillins Rash   • Ibandronic Acid Nausea Only and Unknown - High Severity        Social History     Socioeconomic History   • Marital status:    Tobacco Use   • Smoking status: Former     Packs/day: 0.50     Years: 5.00     Pack years: 2.50     Types: Cigarettes   • Smokeless tobacco: Never   Vaping Use   • Vaping Use: Never used   Substance and Sexual Activity   • Alcohol use: Never   • Drug use: Never   • Sexual activity: Not Currently        REVIEW OF SYSTEMS    Constitutional: Denies fevers, chills, weight loss  Cardiovascular: Denies chest pain, shortness of breath  Skin: Denies rashes, acute skin changes  Neurologic: Denies headache, loss of consciousness  MSK: Right knee pain      Objective   Vital Signs:   Pulse 71   Ht 157.5 cm (62\")   Wt 76.7 kg (169 lb)   SpO2 95%   BMI 30.91 kg/m²     Body mass index is 30.91 kg/m².    Physical Exam    Right knee: Resolving ecchymosis, extension -10, " flexion 100 swelling to the anterior medial knee, no erythema resolving ecchymosis, extension    Procedures    Imaging Results (Most Recent)     None           Result Review :   The following data was reviewed by: THIEN Cruz on 03/02/2023:               Assessment and Plan    Diagnoses and all orders for this visit:    1. Primary osteoarthritis of right knee (Primary)    2. Pain of knee joint with osteochondral injury        Discussed diagnosis and treatment options with the patient they were advised if any new or concerning symptoms occur to call us right away otherwise they state they are happy with her progress she will finish antibiotic and if any concerns they should call us otherwise follow-up as needed per their request    Call or return if worsening symptoms.    Follow Up   Return if symptoms worsen or fail to improve.  Patient was given instructions and counseling regarding her condition or for health maintenance advice. Please see specific information pulled into the AVS if appropriate.

## 2023-03-09 ENCOUNTER — OFFICE VISIT (OUTPATIENT)
Dept: FAMILY MEDICINE CLINIC | Facility: CLINIC | Age: 83
End: 2023-03-09
Payer: MEDICARE

## 2023-03-09 VITALS
DIASTOLIC BLOOD PRESSURE: 76 MMHG | BODY MASS INDEX: 30.73 KG/M2 | WEIGHT: 167 LBS | TEMPERATURE: 97.9 F | SYSTOLIC BLOOD PRESSURE: 130 MMHG | HEART RATE: 78 BPM | OXYGEN SATURATION: 98 % | HEIGHT: 62 IN

## 2023-03-09 DIAGNOSIS — N18.32 STAGE 3B CHRONIC KIDNEY DISEASE: ICD-10-CM

## 2023-03-09 DIAGNOSIS — M13.0 POLYARTHROPATHY: ICD-10-CM

## 2023-03-09 DIAGNOSIS — G62.9 PERIPHERAL NERVE DISEASE: ICD-10-CM

## 2023-03-09 DIAGNOSIS — N18.30 TYPE 2 DIABETES MELLITUS WITH STAGE 3 CHRONIC KIDNEY DISEASE, WITHOUT LONG-TERM CURRENT USE OF INSULIN, UNSPECIFIED WHETHER STAGE 3A OR 3B CKD: Primary | ICD-10-CM

## 2023-03-09 DIAGNOSIS — E11.22 TYPE 2 DIABETES MELLITUS WITH STAGE 3 CHRONIC KIDNEY DISEASE, WITHOUT LONG-TERM CURRENT USE OF INSULIN, UNSPECIFIED WHETHER STAGE 3A OR 3B CKD: Primary | ICD-10-CM

## 2023-03-09 RX ORDER — TRAMADOL HYDROCHLORIDE 50 MG/1
50 TABLET ORAL EVERY 8 HOURS PRN
Qty: 90 TABLET | Refills: 2 | Status: SHIPPED | OUTPATIENT
Start: 2023-03-09

## 2023-03-09 RX ORDER — PREGABALIN 75 MG/1
75 CAPSULE ORAL NIGHTLY
Qty: 30 CAPSULE | Refills: 2 | Status: SHIPPED | OUTPATIENT
Start: 2023-03-09

## 2023-03-09 NOTE — PROGRESS NOTES
Answers for HPI/ROS submitted by the patient on 3/4/2023  What is the primary reason for your visit?: Diabetes    Chief Complaint  Med Refill    Subjective            Ada FÁTIMA Miya presents to [facility]  History of Present Illness  Pt here for the for the F/U on the tramadol and the Lyrica --for the advanced osteoarthritis multiple joints and peripheral nerve pain as well patient has chronic kidney disease stage III and unable to take any nonsteroidal anti-inflammatory--tolerates medication well with no side effects no issues reported has been on these 2 medications as maintenance for several years now and has done very well--no aberrant behaviors noted and no signs or symptoms of misuse nor abuse follows up every 3 months and is very compliant with her follow-up visits and and taking the medication as prescribed and Raz report is always negative as she gets these through mail order [facility] which is a [facility]--and overall improves her quality of life and patient is able to do for the most part her own ADLs and always uses a cane to walk    PEG: A Three-Item Scale Assessing Pain Intensity and Interference  0 being no pain 10 pain as bad as you can imagine  1. What number best describes your pain on average in the past week? 2/10 on meds    2.  What number best describes how, during the past week, pain has interfered with your enjoyment of life? 4/10   3.  What number best describes how, during the past week, pain has interfered with your general activity? 8/10   Diabetes  She presents for her follow-up diabetic visit. She has type 2 diabetes mellitus. MedicAlert identification noted. The initial diagnosis of diabetes was made 10 Years ago. Pertinent negatives for hypoglycemia include no confusion, dizziness, headaches, hunger, mood changes, nervousness/anxiousness, pallor, seizures, sleepiness, speech difficulty, sweats or tremors. Associated symptoms include foot  paresthesias. Pertinent negatives for diabetes include no blurred vision, no chest pain, no fatigue, no foot ulcerations, no polydipsia, no polyphagia, no polyuria, no visual change, no weakness and no weight loss. Pertinent negatives for hypoglycemia complications include no blackouts, no hospitalization, no nocturnal hypoglycemia, no required assistance and no required glucagon injection. Symptoms are stable. Diabetic complications include heart disease, nephropathy, peripheral neuropathy and PVD. Pertinent negatives for diabetic complications include no CVA, impotence or retinopathy. Risk factors for coronary artery disease include dyslipidemia, family history and hypertension. Current diabetic treatment includes diet and oral agent (monotherapy). She is compliant with treatment most of the time. Her weight is stable. She is following a generally healthy diet. Meal planning includes avoidance of concentrated sweets. She has not had a previous visit with a dietitian. She never participates in exercise. She monitors blood glucose at home 1-2 x per day. Blood glucose monitoring compliance is good. There is no change in her home blood glucose trend. Her breakfast blood glucose is taken between 5-6 am. Her breakfast blood glucose range is generally 70-90 mg/dl. Her highest blood glucose is 130-140 mg/dl. Her overall blood glucose range is 130-140 mg/dl. She does not see a podiatrist.Eye exam is current.       PHQ-2 Total Score:    PHQ-9 Total Score:      Past Medical History:   Diagnosis Date   • Benign essential hypertension    • CKD (chronic kidney disease)    • GERD (gastroesophageal reflux disease)    • Gout    • HLD (hyperlipidemia)    • Hypothyroidism        Allergies   Allergen Reactions   • Contrast Dye (Echo Or Unknown Ct/Mr) Unknown - High Severity     Category: IV Contrast Dyes;      • Penicillins Rash   • Ibandronic Acid Nausea Only and Unknown - High Severity        Past Surgical History:   Procedure  Laterality Date   • CHOLECYSTECTOMY     • HYSTERECTOMY          Social History     Tobacco Use   • Smoking status: Former     Packs/day: 0.50     Years: 5.00     Pack years: 2.50     Types: Cigarettes   • Smokeless tobacco: Never   Vaping Use   • Vaping Use: Never used   Substance Use Topics   • Alcohol use: Never   • Drug use: Never       Family History   Problem Relation Age of Onset   • Arthritis Father    • Colon cancer Father    • Colon cancer Brother         There are no preventive care reminders to display for this patient.     Current Outpatient Medications on File Prior to Visit   Medication Sig   • allopurinol (ZYLOPRIM) 100 MG tablet Take 1 tablet by mouth Daily.   • aspirin 81 MG EC tablet Aspir-81 81 mg oral tablet,delayed release (DR/EC) take 1 tablet (81 mg) by oral route once daily   Suspended   • atorvastatin (LIPITOR) 40 MG tablet Take 1 tablet by mouth Daily.   • Blood Glucose Monitoring Suppl (FreeStyle Lite) device 1 each Daily.   • JOHNSON PO Take  by mouth. Johnosn gels at HS for the gout pain OTC   • clopidogrel (PLAVIX) 75 MG tablet Take 1 tablet by mouth Daily.   • dapagliflozin Propanediol 10 MG tablet Take 10 mg by mouth Daily. Per Dr Luis   • Diclofenac Sodium (VOLTAREN) 1 % gel gel Apply 4 g topically to the appropriate area as directed 4 (Four) Times a Day As Needed (OA joint pain).   • glucose blood test strip 1 each by Other route Daily. Use as instructed   • Lancets (freestyle) lancets 1 each by Other route Daily. Use as instructed   • levothyroxine (SYNTHROID, LEVOTHROID) 75 MCG tablet Take 1 tablet by mouth Every Morning.   • nitroglycerin (NITROSTAT) 0.4 MG SL tablet Take 1 tablet by mouth Every 5 (Five) Minutes As Needed for Chest Pain.   • pantoprazole (PROTONIX) 40 MG EC tablet Take 1 tablet by mouth Daily.   • sacubitril-valsartan (ENTRESTO) 24-26 MG tablet Take 1 tablet by mouth 2 (Two) Times a Day.   • metoprolol succinate XL (TOPROL-XL) 50 MG 24 hr tablet Take 1 tablet by  "mouth Daily.     No current facility-administered medications on file prior to visit.       Immunization History   Administered Date(s) Administered   • COVID-19 (MODERNA) 1st, 2nd, 3rd Dose Only 01/21/2021, 02/23/2021, 11/03/2021   • COVID-19 (MODERNA) BIVALENT BOOSTER 12+YRS 10/06/2022   • FLUAD TRI 65YR+ 10/26/2017   • Fluzone High Dose =>65 Years ([employer] ONLY) 10/27/2014, 10/28/2015, 10/26/2016   • Fluzone High-Dose 65+yrs 09/27/2021, 09/28/2022   • Hepatitis A 10/10/2018, 09/08/2020   • Influenza, Unspecified 10/10/2018, 09/30/2019   • Pneumococcal Conjugate 20-Valent (PCV20) 12/08/2022       Review of Systems   Constitutional: Negative for fatigue and unexpected weight loss.   Eyes: Negative for blurred vision.   Respiratory: Negative for shortness of breath.    Cardiovascular: Negative for chest pain.   Gastrointestinal: Negative for diarrhea.   Endocrine: Negative for polydipsia, polyphagia and polyuria.   Genitourinary: Negative for dysuria and impotence.   Musculoskeletal: Positive for arthralgias and gait problem.        Knees and hands, left hip and lower back and toes  --chronically    Skin: Negative for pallor.   Neurological: Positive for numbness. Negative for dizziness, tremors, seizures, speech difficulty, weakness and confusion.        Chronically    Psychiatric/Behavioral: The patient is not nervous/anxious.         Objective     /76 (BP Location: Left arm, Patient Position: Sitting, Cuff Size: Adult)   Pulse 78   Temp 97.9 °F (36.6 °C) (Temporal)   Ht 157.5 cm (62\")   Wt 75.8 kg (167 lb)   SpO2 98%   BMI 30.54 kg/m²       Physical Exam  Vitals and nursing note reviewed. Exam conducted with a chaperone present.   Constitutional:       Appearance: Normal appearance.   HENT:      Head: Normocephalic.      Right Ear: External ear normal.      Left Ear: External ear normal.      Nose: Nose normal.      Mouth/Throat:      Comments: Wearing mask  Eyes:      Pupils: Pupils are equal, round, " and reactive to light.   Cardiovascular:      Rate and Rhythm: Normal rate and regular rhythm.      Heart sounds: Normal heart sounds.   Pulmonary:      Effort: Pulmonary effort is normal.      Breath sounds: Normal breath sounds.   Musculoskeletal:         General: Swelling and tenderness present.      Cervical back: Normal range of motion.      Comments: Right knee inner aspect of the right knee still with bruising and some palpable fluid--vastly improved from last time--and seen ortho twice    Skin:     General: Skin is warm and dry.   Neurological:      Mental Status: She is alert and oriented to person, place, and time.   Psychiatric:         Mood and Affect: Mood normal.         Behavior: Behavior normal.         Thought Content: Thought content normal.         Judgment: Judgment normal.         Result Review :                          Assessment and Plan      Diagnoses and all orders for this visit:    1. Type 2 diabetes mellitus with stage 3 chronic kidney disease, without long-term current use of insulin, unspecified whether stage 3a or 3b CKD (HCC) (Primary)  -     traMADol (Ultram) 50 MG tablet; Take 1 tablet by mouth Every 8 (Eight) Hours As Needed for Moderate Pain.  Dispense: 90 tablet; Refill: 2  -     pregabalin (Lyrica) 75 MG capsule; Take 1 capsule by mouth Every Night.  Dispense: 30 capsule; Refill: 2    2. Stage 3b chronic kidney disease (HCC)  -     traMADol (Ultram) 50 MG tablet; Take 1 tablet by mouth Every 8 (Eight) Hours As Needed for Moderate Pain.  Dispense: 90 tablet; Refill: 2  -     pregabalin (Lyrica) 75 MG capsule; Take 1 capsule by mouth Every Night.  Dispense: 30 capsule; Refill: 2    3. Peripheral nerve disease  -     traMADol (Ultram) 50 MG tablet; Take 1 tablet by mouth Every 8 (Eight) Hours As Needed for Moderate Pain.  Dispense: 90 tablet; Refill: 2  -     pregabalin (Lyrica) 75 MG capsule; Take 1 capsule by mouth Every Night.  Dispense: 30 capsule; Refill: 2    4.  Polyarthropathy  -     traMADol (Ultram) 50 MG tablet; Take 1 tablet by mouth Every 8 (Eight) Hours As Needed for Moderate Pain.  Dispense: 90 tablet; Refill: 2            Follow Up     Return in about 3 months (around 6/9/2023), or if symptoms worsen or fail to improve, for Recheck, fasting labs and refills.

## 2023-04-03 ENCOUNTER — CLINICAL SUPPORT (OUTPATIENT)
Dept: FAMILY MEDICINE CLINIC | Facility: CLINIC | Age: 83
End: 2023-04-03
Payer: MEDICARE

## 2023-04-03 DIAGNOSIS — E83.52 HYPERCALCEMIA: ICD-10-CM

## 2023-04-03 LAB
CALCIUM SPEC-SCNC: 10.4 MG/DL (ref 8.6–10.5)
PTH-INTACT SERPL-MCNC: 108 PG/ML (ref 15–65)

## 2023-04-03 PROCEDURE — 36415 COLL VENOUS BLD VENIPUNCTURE: CPT | Performed by: NURSE PRACTITIONER

## 2023-04-03 PROCEDURE — 83970 ASSAY OF PARATHORMONE: CPT | Performed by: NURSE PRACTITIONER

## 2023-04-03 PROCEDURE — 82310 ASSAY OF CALCIUM: CPT | Performed by: NURSE PRACTITIONER

## 2023-04-03 NOTE — PROGRESS NOTES
..  Venipuncture Blood Specimen Collection  Venipuncture performed in LT arm by America Renteria MA with good hemostasis. Patient tolerated the procedure well without complications.   04/03/23   America Renteria MA

## 2023-04-04 DIAGNOSIS — R79.89 ELEVATED PTHRP LEVEL: Primary | ICD-10-CM

## 2023-04-04 NOTE — PROGRESS NOTES
Please mail letter to pt stating    Effie the calcium level normalized--but the PTH (parathyroid hormone level is elevated) and I will need further blood work on this--please--if you can please stop but non-fasting and get these done--and then we still may need a CT of the neck--based off these labs-

## 2023-04-05 ENCOUNTER — CLINICAL SUPPORT (OUTPATIENT)
Dept: FAMILY MEDICINE CLINIC | Facility: CLINIC | Age: 83
End: 2023-04-05
Payer: MEDICARE

## 2023-04-05 DIAGNOSIS — R79.89 ELEVATED PTHRP LEVEL: ICD-10-CM

## 2023-04-05 LAB
25(OH)D3 SERPL-MCNC: 57.1 NG/ML (ref 30–100)
PHOSPHATE SERPL-MCNC: 3.8 MG/DL (ref 2.5–4.5)

## 2023-04-05 PROCEDURE — 82565 ASSAY OF CREATININE: CPT | Performed by: NURSE PRACTITIONER

## 2023-04-05 PROCEDURE — 36415 COLL VENOUS BLD VENIPUNCTURE: CPT | Performed by: NURSE PRACTITIONER

## 2023-04-05 PROCEDURE — 84100 ASSAY OF PHOSPHORUS: CPT | Performed by: NURSE PRACTITIONER

## 2023-04-05 PROCEDURE — 82310 ASSAY OF CALCIUM: CPT | Performed by: NURSE PRACTITIONER

## 2023-04-05 PROCEDURE — 83970 ASSAY OF PARATHORMONE: CPT | Performed by: NURSE PRACTITIONER

## 2023-04-05 PROCEDURE — 82306 VITAMIN D 25 HYDROXY: CPT | Performed by: NURSE PRACTITIONER

## 2023-04-05 NOTE — PROGRESS NOTES
..  Venipuncture Blood Specimen Collection  Venipuncture performed in left arm by Jahaira Lawson with good hemostasis. Patient tolerated the procedure well without complications.   04/05/23   Jahaira Lawson

## 2023-04-06 LAB
CALCIUM SERPL-MCNC: 10.4 MG/DL (ref 8.7–10.3)
CREAT SERPL-MCNC: 1.26 MG/DL (ref 0.57–1)
EGFRCR SERPLBLD CKD-EPI 2021: 43 ML/MIN/1.73
INTACT PTH: ABNORMAL
PHOSPHATE SERPL-MCNC: 3.8 MG/DL (ref 3–4.3)
PTH-INTACT SERPL-MCNC: 82 PG/ML (ref 15–65)

## 2023-04-06 NOTE — PROGRESS NOTES
Please mail letter to patient stating    Effie PTH intact serial monitoring panel that I did does show that you still have an elevated calcium and normal phosphorus of course the kidney function test show stable chronic kidney disease stage III and the PTH is still elevated at 82 down from 3 days ago when it was 108 but as you can see on the interpretation it is still consistent with either primary or secondary hyperparathyroidism and I need to proceed with a referral to endocrinology-please let me know if it is okay with you for me to proceed with this referral

## 2023-04-06 NOTE — PROGRESS NOTES
Please mail letter to patient stating    Effie the vitamin D levels were normal range and the phosphorus was also normal range; and I am still awaiting the PTH level

## 2023-04-07 ENCOUNTER — CLINICAL SUPPORT (OUTPATIENT)
Dept: FAMILY MEDICINE CLINIC | Facility: CLINIC | Age: 83
End: 2023-04-07
Payer: MEDICARE

## 2023-04-07 DIAGNOSIS — R79.89 ELEVATED PTHRP LEVEL: ICD-10-CM

## 2023-04-07 LAB
CALCIUM 24H UR-MCNC: 11.3 MG/DL
CALCIUM 24H UR-MRATE: 118.7 MG/24 HR (ref 100–300)
COLLECT DURATION TIME UR: 24 HRS
SPECIMEN VOL 24H UR: 1050 ML

## 2023-04-07 PROCEDURE — 82340 ASSAY OF CALCIUM IN URINE: CPT | Performed by: NURSE PRACTITIONER

## 2023-04-07 PROCEDURE — 81050 URINALYSIS VOLUME MEASURE: CPT | Performed by: NURSE PRACTITIONER

## 2023-06-08 ENCOUNTER — OFFICE VISIT (OUTPATIENT)
Dept: FAMILY MEDICINE CLINIC | Facility: CLINIC | Age: 83
End: 2023-06-08
Payer: MEDICARE

## 2023-06-08 VITALS
TEMPERATURE: 96.6 F | DIASTOLIC BLOOD PRESSURE: 64 MMHG | OXYGEN SATURATION: 98 % | SYSTOLIC BLOOD PRESSURE: 120 MMHG | HEIGHT: 62 IN | WEIGHT: 158 LBS | BODY MASS INDEX: 29.08 KG/M2 | HEART RATE: 80 BPM

## 2023-06-08 DIAGNOSIS — E11.22 TYPE 2 DIABETES MELLITUS WITH STAGE 3 CHRONIC KIDNEY DISEASE, WITHOUT LONG-TERM CURRENT USE OF INSULIN, UNSPECIFIED WHETHER STAGE 3A OR 3B CKD: ICD-10-CM

## 2023-06-08 DIAGNOSIS — N18.30 TYPE 2 DIABETES MELLITUS WITH STAGE 3 CHRONIC KIDNEY DISEASE, WITHOUT LONG-TERM CURRENT USE OF INSULIN, UNSPECIFIED WHETHER STAGE 3A OR 3B CKD: ICD-10-CM

## 2023-06-08 DIAGNOSIS — E78.49 OTHER HYPERLIPIDEMIA: ICD-10-CM

## 2023-06-08 DIAGNOSIS — E11.9 DIET-CONTROLLED DIABETES MELLITUS: ICD-10-CM

## 2023-06-08 DIAGNOSIS — E03.9 HYPOTHYROIDISM, UNSPECIFIED TYPE: ICD-10-CM

## 2023-06-08 DIAGNOSIS — I42.9 CARDIOMYOPATHY, UNSPECIFIED TYPE: ICD-10-CM

## 2023-06-08 DIAGNOSIS — E03.8 OTHER SPECIFIED HYPOTHYROIDISM: ICD-10-CM

## 2023-06-08 DIAGNOSIS — M13.0 POLYARTHROPATHY: ICD-10-CM

## 2023-06-08 DIAGNOSIS — G62.9 PERIPHERAL NERVE DISEASE: ICD-10-CM

## 2023-06-08 DIAGNOSIS — I10 BENIGN ESSENTIAL HYPERTENSION: ICD-10-CM

## 2023-06-08 DIAGNOSIS — N18.32 STAGE 3B CHRONIC KIDNEY DISEASE: ICD-10-CM

## 2023-06-08 DIAGNOSIS — M1A.9XX0 CHRONIC GOUT WITHOUT TOPHUS, UNSPECIFIED CAUSE, UNSPECIFIED SITE: ICD-10-CM

## 2023-06-08 DIAGNOSIS — I70.90 ATHEROSCLEROSIS: Primary | ICD-10-CM

## 2023-06-08 DIAGNOSIS — E78.2 MIXED HYPERLIPIDEMIA: ICD-10-CM

## 2023-06-08 DIAGNOSIS — E11.9 TYPE 2 DIABETES MELLITUS WITHOUT COMPLICATION, WITHOUT LONG-TERM CURRENT USE OF INSULIN: ICD-10-CM

## 2023-06-08 DIAGNOSIS — K21.9 GASTROESOPHAGEAL REFLUX DISEASE WITHOUT ESOPHAGITIS: ICD-10-CM

## 2023-06-08 LAB
ALBUMIN SERPL-MCNC: 3.9 G/DL (ref 3.5–5.2)
ALBUMIN UR-MCNC: 7.1 MG/DL
ALBUMIN/GLOB SERPL: 1.1 G/DL
ALP SERPL-CCNC: 89 U/L (ref 39–117)
ALT SERPL W P-5'-P-CCNC: 22 U/L (ref 1–33)
ANION GAP SERPL CALCULATED.3IONS-SCNC: 11.8 MMOL/L (ref 5–15)
AST SERPL-CCNC: 29 U/L (ref 1–32)
BASOPHILS # BLD AUTO: 0.04 10*3/MM3 (ref 0–0.2)
BASOPHILS NFR BLD AUTO: 0.5 % (ref 0–1.5)
BILIRUB SERPL-MCNC: 0.7 MG/DL (ref 0–1.2)
BUN SERPL-MCNC: 13 MG/DL (ref 8–23)
BUN/CREAT SERPL: 11.6 (ref 7–25)
CALCIUM SPEC-SCNC: 10.4 MG/DL (ref 8.6–10.5)
CHLORIDE SERPL-SCNC: 104 MMOL/L (ref 98–107)
CHOLEST SERPL-MCNC: 126 MG/DL (ref 0–200)
CO2 SERPL-SCNC: 26.2 MMOL/L (ref 22–29)
CREAT SERPL-MCNC: 1.12 MG/DL (ref 0.57–1)
CREAT UR-MCNC: 171.4 MG/DL
DEPRECATED RDW RBC AUTO: 44.2 FL (ref 37–54)
EGFRCR SERPLBLD CKD-EPI 2021: 49.8 ML/MIN/1.73
EOSINOPHIL # BLD AUTO: 0.34 10*3/MM3 (ref 0–0.4)
EOSINOPHIL NFR BLD AUTO: 4.3 % (ref 0.3–6.2)
ERYTHROCYTE [DISTWIDTH] IN BLOOD BY AUTOMATED COUNT: 13.4 % (ref 12.3–15.4)
GLOBULIN UR ELPH-MCNC: 3.4 GM/DL
GLUCOSE SERPL-MCNC: 108 MG/DL (ref 65–99)
HBA1C MFR BLD: 5.4 % (ref 4.8–5.6)
HCT VFR BLD AUTO: 40.7 % (ref 34–46.6)
HDLC SERPL-MCNC: 49 MG/DL (ref 40–60)
HGB BLD-MCNC: 13.4 G/DL (ref 12–15.9)
IMM GRANULOCYTES # BLD AUTO: 0.01 10*3/MM3 (ref 0–0.05)
IMM GRANULOCYTES NFR BLD AUTO: 0.1 % (ref 0–0.5)
LDLC SERPL CALC-MCNC: 59 MG/DL (ref 0–100)
LDLC/HDLC SERPL: 1.18 {RATIO}
LYMPHOCYTES # BLD AUTO: 1.84 10*3/MM3 (ref 0.7–3.1)
LYMPHOCYTES NFR BLD AUTO: 23.3 % (ref 19.6–45.3)
MCH RBC QN AUTO: 29.7 PG (ref 26.6–33)
MCHC RBC AUTO-ENTMCNC: 32.9 G/DL (ref 31.5–35.7)
MCV RBC AUTO: 90.2 FL (ref 79–97)
MICROALBUMIN/CREAT UR: 41.4 MG/G
MONOCYTES # BLD AUTO: 0.66 10*3/MM3 (ref 0.1–0.9)
MONOCYTES NFR BLD AUTO: 8.3 % (ref 5–12)
NEUTROPHILS NFR BLD AUTO: 5.02 10*3/MM3 (ref 1.7–7)
NEUTROPHILS NFR BLD AUTO: 63.5 % (ref 42.7–76)
NRBC BLD AUTO-RTO: 0 /100 WBC (ref 0–0.2)
PLATELET # BLD AUTO: 234 10*3/MM3 (ref 140–450)
PMV BLD AUTO: 12.5 FL (ref 6–12)
POTASSIUM SERPL-SCNC: 3.3 MMOL/L (ref 3.5–5.2)
PROT SERPL-MCNC: 7.3 G/DL (ref 6–8.5)
RBC # BLD AUTO: 4.51 10*6/MM3 (ref 3.77–5.28)
SODIUM SERPL-SCNC: 142 MMOL/L (ref 136–145)
TRIGL SERPL-MCNC: 97 MG/DL (ref 0–150)
TSH SERPL DL<=0.05 MIU/L-ACNC: 2.92 UIU/ML (ref 0.27–4.2)
URATE SERPL-MCNC: 3.4 MG/DL (ref 2.4–5.7)
VLDLC SERPL-MCNC: 18 MG/DL (ref 5–40)
WBC NRBC COR # BLD: 7.91 10*3/MM3 (ref 3.4–10.8)

## 2023-06-08 PROCEDURE — 84550 ASSAY OF BLOOD/URIC ACID: CPT | Performed by: NURSE PRACTITIONER

## 2023-06-08 PROCEDURE — 83036 HEMOGLOBIN GLYCOSYLATED A1C: CPT | Performed by: NURSE PRACTITIONER

## 2023-06-08 PROCEDURE — 80061 LIPID PANEL: CPT | Performed by: NURSE PRACTITIONER

## 2023-06-08 PROCEDURE — 99214 OFFICE O/P EST MOD 30 MIN: CPT | Performed by: NURSE PRACTITIONER

## 2023-06-08 PROCEDURE — 3074F SYST BP LT 130 MM HG: CPT | Performed by: NURSE PRACTITIONER

## 2023-06-08 PROCEDURE — 82570 ASSAY OF URINE CREATININE: CPT | Performed by: NURSE PRACTITIONER

## 2023-06-08 PROCEDURE — 36415 COLL VENOUS BLD VENIPUNCTURE: CPT | Performed by: NURSE PRACTITIONER

## 2023-06-08 PROCEDURE — 85025 COMPLETE CBC W/AUTO DIFF WBC: CPT | Performed by: NURSE PRACTITIONER

## 2023-06-08 PROCEDURE — 84443 ASSAY THYROID STIM HORMONE: CPT | Performed by: NURSE PRACTITIONER

## 2023-06-08 PROCEDURE — 80053 COMPREHEN METABOLIC PANEL: CPT | Performed by: NURSE PRACTITIONER

## 2023-06-08 PROCEDURE — 3078F DIAST BP <80 MM HG: CPT | Performed by: NURSE PRACTITIONER

## 2023-06-08 PROCEDURE — 82043 UR ALBUMIN QUANTITATIVE: CPT | Performed by: NURSE PRACTITIONER

## 2023-06-08 RX ORDER — PREGABALIN 75 MG/1
75 CAPSULE ORAL NIGHTLY
Qty: 30 CAPSULE | Refills: 2 | Status: SHIPPED | OUTPATIENT
Start: 2023-06-08

## 2023-06-08 RX ORDER — CLOPIDOGREL BISULFATE 75 MG/1
75 TABLET ORAL DAILY
Qty: 90 TABLET | Refills: 1 | Status: SHIPPED | OUTPATIENT
Start: 2023-06-08

## 2023-06-08 RX ORDER — TRAMADOL HYDROCHLORIDE 50 MG/1
50 TABLET ORAL EVERY 8 HOURS PRN
Qty: 90 TABLET | Refills: 2 | Status: SHIPPED | OUTPATIENT
Start: 2023-06-08

## 2023-06-08 RX ORDER — LANCETS 28 GAUGE
1 EACH MISCELLANEOUS DAILY
Qty: 100 EACH | Refills: 3 | Status: SHIPPED | OUTPATIENT
Start: 2023-06-08

## 2023-06-08 RX ORDER — PANTOPRAZOLE SODIUM 40 MG/1
40 TABLET, DELAYED RELEASE ORAL 2 TIMES DAILY
Qty: 180 TABLET | Refills: 1 | Status: SHIPPED | OUTPATIENT
Start: 2023-06-08

## 2023-06-08 RX ORDER — LEVOTHYROXINE SODIUM 0.07 MG/1
75 TABLET ORAL
Qty: 90 TABLET | Refills: 1 | Status: SHIPPED | OUTPATIENT
Start: 2023-06-08

## 2023-06-08 RX ORDER — NITROGLYCERIN 0.4 MG/1
0.4 TABLET SUBLINGUAL
Qty: 25 TABLET | Refills: 1 | Status: SHIPPED | OUTPATIENT
Start: 2023-06-08

## 2023-06-08 RX ORDER — ALLOPURINOL 100 MG/1
100 TABLET ORAL DAILY
Qty: 90 TABLET | Refills: 1 | Status: SHIPPED | OUTPATIENT
Start: 2023-06-08

## 2023-06-08 RX ORDER — METOPROLOL SUCCINATE 50 MG/1
50 TABLET, EXTENDED RELEASE ORAL 2 TIMES DAILY
Qty: 180 TABLET | Refills: 1 | Status: SHIPPED | OUTPATIENT
Start: 2023-06-08

## 2023-06-08 RX ORDER — ATORVASTATIN CALCIUM 40 MG/1
40 TABLET, FILM COATED ORAL DAILY
Qty: 90 TABLET | Refills: 1 | Status: SHIPPED | OUTPATIENT
Start: 2023-06-08

## 2023-06-08 NOTE — PROGRESS NOTES
Venipuncture Blood Specimen Collection  Venipuncture performed in left arm by audrey fernandez with good hemostasis. Patient tolerated the procedure well without complications.   06/08/23   Miranda Quiroga

## 2023-06-08 NOTE — PROGRESS NOTES
Chief Complaint  Diabetes (Medication refills)    Subjective          Ada FÁTIMA Holliday presents to [facility]  History of Present Illness  Presents for routine follow-up and to refill medications.    Cardiac/hypertension: She was originally taking metoprolol twice daily.  At her last visit I asked and it was changed to once daily.  She began to experience some palpitations, she could hear her heartbeat in her ears.  Another provider updated the prescription to twice daily and she has been doing fine.  She does not check her blood pressure routinely at home.  She denies persistent headache or dizziness, chest pain or swelling.  She keeps a nitroglycerin prescription.  She has only used it 1 time but needs a refill. She denies any problems with the Plavix.  She sees cardiology every 6 months.  She denies any unusual bleeding.    GERD: She reports that she had a virus 3 to 4 weeks ago.  She has had more heartburn since the resolution of that virus.  She was originally taking Protonix once daily.  She has been taking that when plus an over-the-counter Protonix since the virus.  It is keeping her heartburn controlled.  She is requesting to continue the twice daily dosing for this time.    Gout: Her gout is well controlled using allopurinol.  She has not had a flareup for at least 6 months.  The flareup occurred when she stopped the medications because she thought she did not need it.         Arthritis: She uses Voltaren gel on multiple joints to help with her arthritis pain.  No local irritation and she states it significantly helps.  She also takes tramadol 3 times a day.  She has been unable to tolerate anything stronger.  Cardiology does not want her taking nonsteroidals.  She denies side effects from the tramadol and states that it allows her to be as active as possible.      Thyroid: She denies any problems with her thyroid medication and reports compliance with taking the medication  daily.  No excessive fatigue, palpitations, or constipation.  She has lost some weight since the previous visit but that was because of the virus that she had.  Her appetite has been significantly decreased.    Neuropathy: Lyrica successfully treats her neuropathy in her feet.  She has very mild numbness and tingling but nothing that is not manageable.    Diabetes: She reports that she checks her blood sugar daily and if she feels bad.  As noted previously she does not have a good appetite at this time but she tries to get a good balance of foods.  Cardiologist has been prescribing Farxiga.  She denies hypoglycemia.               Past Medical History:   Diagnosis Date    Benign essential hypertension     CKD (chronic kidney disease)     GERD (gastroesophageal reflux disease)     Gout     HLD (hyperlipidemia)     Hypothyroidism        Allergies   Allergen Reactions    Contrast Dye (Echo Or Unknown Ct/Mr) Unknown - High Severity     Category: IV Contrast Dyes;       Penicillins Rash    Ibandronic Acid Nausea Only and Unknown - High Severity        Past Surgical History:   Procedure Laterality Date    CHOLECYSTECTOMY      HYSTERECTOMY          Social History     Socioeconomic History    Marital status:    Tobacco Use    Smoking status: Former     Packs/day: 0.50     Years: 5.00     Pack years: 2.50     Types: Cigarettes    Smokeless tobacco: Never   Vaping Use    Vaping Use: Never used   Substance and Sexual Activity    Alcohol use: Never    Drug use: Never    Sexual activity: Not Currently       Family History   Problem Relation Age of Onset    Arthritis Father     Colon cancer Father     Colon cancer Brother         Health Maintenance Due   Topic Date Due    COVID-19 Vaccine (5 - Moderna series) 02/06/2023    DIABETIC EYE EXAM  04/07/2023    HEMOGLOBIN A1C  06/08/2023        Last Completed Pap Smear       This patient has no relevant Health Maintenance data.            Last Completed Mammogram       This  patient has no relevant Health Maintenance data.            Last Completed Colonoscopy       This patient has no relevant Health Maintenance data.            Current Outpatient Medications on File Prior to Visit   Medication Sig    aspirin 81 MG EC tablet Aspir-81 81 mg oral tablet,delayed release (DR/EC) take 1 tablet (81 mg) by oral route once daily   Suspended    Blood Glucose Monitoring Suppl (FreeStyle Lite) device 1 each Daily.    JOHNSON PO Take  by mouth. Johnson gels at HS for the gout pain OTC    dapagliflozin Propanediol 10 MG tablet Take 10 mg by mouth Daily. Per Dr Luis    sacubitril-valsartan (ENTRESTO) 24-26 MG tablet Take 1 tablet by mouth 2 (Two) Times a Day.    [DISCONTINUED] allopurinol (ZYLOPRIM) 100 MG tablet Take 1 tablet by mouth Daily.    [DISCONTINUED] atorvastatin (LIPITOR) 40 MG tablet Take 1 tablet by mouth Daily.    [DISCONTINUED] clopidogrel (PLAVIX) 75 MG tablet Take 1 tablet by mouth Daily.    [DISCONTINUED] Diclofenac Sodium (VOLTAREN) 1 % gel gel Apply 4 g topically to the appropriate area as directed 4 (Four) Times a Day As Needed (OA joint pain).    [DISCONTINUED] glucose blood test strip 1 each by Other route Daily. Use as instructed    [DISCONTINUED] Lancets (freestyle) lancets 1 each by Other route Daily. Use as instructed    [DISCONTINUED] levothyroxine (SYNTHROID, LEVOTHROID) 75 MCG tablet Take 1 tablet by mouth Every Morning.    [DISCONTINUED] metoprolol succinate XL (TOPROL-XL) 50 MG 24 hr tablet Take 1 tablet by mouth Daily.    [DISCONTINUED] nitroglycerin (NITROSTAT) 0.4 MG SL tablet Take 1 tablet by mouth Every 5 (Five) Minutes As Needed for Chest Pain.    [DISCONTINUED] pantoprazole (PROTONIX) 40 MG EC tablet Take 1 tablet by mouth Daily.    [DISCONTINUED] pregabalin (Lyrica) 75 MG capsule Take 1 capsule by mouth Every Night.    [DISCONTINUED] traMADol (Ultram) 50 MG tablet Take 1 tablet by mouth Every 8 (Eight) Hours As Needed for Moderate Pain.     No current  "facility-administered medications on file prior to visit.       Immunization History   Administered Date(s) Administered    COVID-19 (MODERNA) 1st,2nd,3rd Dose Monovalent 01/21/2021, 02/23/2021, 11/03/2021    COVID-19 (MODERNA) BIVALENT 12+YRS 10/06/2022    FLUAD TRI 65YR+ 10/26/2017    Fluzone High Dose =>65 Years ([employer] ONLY) 10/27/2014, 10/28/2015, 10/26/2016    Fluzone High-Dose 65+yrs 09/27/2021, 09/28/2022    Hepatitis A 10/10/2018, 09/08/2020    Influenza, Unspecified 10/10/2018, 09/30/2019    Pneumococcal Conjugate 20-Valent (PCV20) 12/08/2022       Review of Systems     Objective     /64 (BP Location: Left arm, Patient Position: Sitting, Cuff Size: Adult)   Pulse 80   Temp 96.6 °F (35.9 °C) (Temporal)   Ht 157.5 cm (62\")   Wt 71.7 kg (158 lb)   SpO2 98%   BMI 28.90 kg/m²         Physical Exam  Vitals and nursing note reviewed.   Constitutional:       Appearance: Normal appearance.   HENT:      Head: Normocephalic.   Eyes:      Pupils: Pupils are equal, round, and reactive to light.   Cardiovascular:      Rate and Rhythm: Normal rate and regular rhythm.      Pulses:           Dorsalis pedis pulses are 2+ on the right side and 2+ on the left side.        Posterior tibial pulses are 2+ on the right side and 2+ on the left side.      Heart sounds: Normal heart sounds.   Pulmonary:      Effort: Pulmonary effort is normal.      Breath sounds: Normal breath sounds.   Abdominal:      Palpations: Abdomen is soft.   Musculoskeletal:      Cervical back: Normal range of motion.      Right foot: Deformity present.      Left foot: Deformity present.      Comments: Bilateral hands with multiple nodes and joints and deviation.   Feet:      Right foot:      Protective Sensation: 5 sites tested.  5 sites sensed.      Skin integrity: Skin integrity normal.      Toenail Condition: Right toenails are abnormally thick.      Left foot:      Protective Sensation: 5 sites tested.  5 sites sensed.      Skin integrity: " Skin integrity normal.      Toenail Condition: Left toenails are abnormally thick.   Skin:     General: Skin is warm and dry.   Neurological:      Mental Status: She is alert.         Result Review :                           Assessment and Plan        Diagnoses and all orders for this visit:    1. Atherosclerosis (Primary)    2. Benign essential hypertension  -     levothyroxine (SYNTHROID, LEVOTHROID) 75 MCG tablet; Take 1 tablet by mouth Every Morning.  Dispense: 90 tablet; Refill: 1  -     metoprolol succinate XL (TOPROL-XL) 50 MG 24 hr tablet; Take 1 tablet by mouth 2 (Two) Times a Day.  Dispense: 180 tablet; Refill: 1    3. Other hyperlipidemia  -     Lipid panel; Future  -     Lipid panel    4. Type 2 diabetes mellitus with stage 3 chronic kidney disease, without long-term current use of insulin, unspecified whether stage 3a or 3b CKD  -     pregabalin (Lyrica) 75 MG capsule; Take 1 capsule by mouth Every Night.  Dispense: 30 capsule; Refill: 2  -     traMADol (Ultram) 50 MG tablet; Take 1 tablet by mouth Every 8 (Eight) Hours As Needed for Moderate Pain.  Dispense: 90 tablet; Refill: 2  -     CBC and Differential; Future  -     Comprehensive metabolic panel; Future  -     Hemoglobin A1c; Future  -     Microalbumin / Creatinine Urine Ratio - Urine, Clean Catch; Future  -     CBC and Differential  -     Comprehensive metabolic panel  -     Hemoglobin A1c  -     Microalbumin / Creatinine Urine Ratio - Urine, Clean Catch    5. Gastroesophageal reflux disease without esophagitis  -     pantoprazole (PROTONIX) 40 MG EC tablet; Take 1 tablet by mouth 2 (Two) Times a Day.  Dispense: 180 tablet; Refill: 1    6. Polyarthropathy  -     Diclofenac Sodium (VOLTAREN) 1 % gel gel; Apply 4 g topically to the appropriate area as directed 4 (Four) Times a Day As Needed (OA joint pain).  Dispense: 350 g; Refill: 1  -     traMADol (Ultram) 50 MG tablet; Take 1 tablet by mouth Every 8 (Eight) Hours As Needed for Moderate Pain.   Dispense: 90 tablet; Refill: 2    7. Peripheral nerve disease  -     pregabalin (Lyrica) 75 MG capsule; Take 1 capsule by mouth Every Night.  Dispense: 30 capsule; Refill: 2  -     traMADol (Ultram) 50 MG tablet; Take 1 tablet by mouth Every 8 (Eight) Hours As Needed for Moderate Pain.  Dispense: 90 tablet; Refill: 2    8. Other specified hypothyroidism    9. Chronic gout without tophus, unspecified cause, unspecified site  -     allopurinol (ZYLOPRIM) 100 MG tablet; Take 1 tablet by mouth Daily.  Dispense: 90 tablet; Refill: 1  -     Uric acid; Future  -     Uric acid    10. Mixed hyperlipidemia  -     atorvastatin (LIPITOR) 40 MG tablet; Take 1 tablet by mouth Daily.  Dispense: 90 tablet; Refill: 1    11. Type 2 diabetes mellitus without complication, without long-term current use of insulin    12. Cardiomyopathy, unspecified type  -     clopidogrel (PLAVIX) 75 MG tablet; Take 1 tablet by mouth Daily.  Dispense: 90 tablet; Refill: 1  -     metoprolol succinate XL (TOPROL-XL) 50 MG 24 hr tablet; Take 1 tablet by mouth 2 (Two) Times a Day.  Dispense: 180 tablet; Refill: 1  -     nitroglycerin (NITROSTAT) 0.4 MG SL tablet; Take 1 tablet by mouth Every 5 (Five) Minutes As Needed for Chest Pain.  Dispense: 25 tablet; Refill: 1    13. Diet-controlled diabetes mellitus  -     glucose blood test strip; 1 each by Other route Daily. Use as instructed  Dispense: 100 each; Refill: 4  -     Lancets (freestyle) lancets; 1 each by Other route Daily. Use as instructed  Dispense: 100 each; Refill: 3    14. Hypothyroidism, unspecified type  -     levothyroxine (SYNTHROID, LEVOTHROID) 75 MCG tablet; Take 1 tablet by mouth Every Morning.  Dispense: 90 tablet; Refill: 1  -     TSH    15. Stage 3b chronic kidney disease  -     pregabalin (Lyrica) 75 MG capsule; Take 1 capsule by mouth Every Night.  Dispense: 30 capsule; Refill: 2  -     traMADol (Ultram) 50 MG tablet; Take 1 tablet by mouth Every 8 (Eight) Hours As Needed for Moderate  Pain.  Dispense: 90 tablet; Refill: 2              Follow Up     Protonix increased to twice daily due to recent symptoms.  Advised that she should return to once daily as soon as her symptoms began to resolve.    Metoprolol ordered at twice daily as previously used.    All other health conditions appear to be stable.    No indication of misuse or abuse of controlled medications.  Medications appear appropriate for her conditions and will need to be used long-term.  Drug screen will need to be ordered at next visit.    Return in about 3 months (around 9/8/2023).    Patient was given instructions and counseling regarding her condition or for health maintenance advice. Please see specific information pulled into the AVS if appropriate.

## 2023-07-25 ENCOUNTER — APPOINTMENT (OUTPATIENT)
Dept: GENERAL RADIOLOGY | Facility: HOSPITAL | Age: 83
End: 2023-07-25
Payer: MEDICARE

## 2023-07-25 ENCOUNTER — HOSPITAL ENCOUNTER (EMERGENCY)
Facility: HOSPITAL | Age: 83
Discharge: HOME | End: 2023-07-26
Attending: EMERGENCY MEDICINE | Admitting: EMERGENCY MEDICINE
Payer: MEDICARE

## 2023-07-25 DIAGNOSIS — S52.501A CLOSED FRACTURE OF DISTAL END OF RIGHT RADIUS, UNSPECIFIED FRACTURE MORPHOLOGY, INITIAL ENCOUNTER: Primary | ICD-10-CM

## 2023-07-25 LAB
ALBUMIN SERPL-MCNC: 3.5 G/DL (ref 3.5–5.2)
ALBUMIN/GLOB SERPL: 1 G/DL
ALP SERPL-CCNC: 96 U/L (ref 39–117)
ALT SERPL W P-5'-P-CCNC: 24 U/L (ref 1–33)
ANION GAP SERPL CALCULATED.3IONS-SCNC: 11 MMOL/L (ref 5–15)
AST SERPL-CCNC: 50 U/L (ref 1–32)
BASOPHILS # BLD AUTO: 0.03 10*3/MM3 (ref 0–0.2)
BASOPHILS NFR BLD AUTO: 0.3 % (ref 0–1.5)
BILIRUB SERPL-MCNC: 0.7 MG/DL (ref 0–1.2)
BUN SERPL-MCNC: 26 MG/DL (ref 8–23)
BUN/CREAT SERPL: 17.9 (ref 7–25)
CALCIUM SPEC-SCNC: 10.3 MG/DL (ref 8.6–10.5)
CHLORIDE SERPL-SCNC: 110 MMOL/L (ref 98–107)
CK SERPL-CCNC: 300 U/L (ref 20–180)
CO2 SERPL-SCNC: 24 MMOL/L (ref 22–29)
CREAT SERPL-MCNC: 1.45 MG/DL (ref 0.57–1)
DEPRECATED RDW RBC AUTO: 50.5 FL (ref 37–54)
EGFRCR SERPLBLD CKD-EPI 2021: 36.5 ML/MIN/1.73
EOSINOPHIL # BLD AUTO: 0.03 10*3/MM3 (ref 0–0.4)
EOSINOPHIL NFR BLD AUTO: 0.3 % (ref 0.3–6.2)
ERYTHROCYTE [DISTWIDTH] IN BLOOD BY AUTOMATED COUNT: 15.4 % (ref 12.3–15.4)
GLOBULIN UR ELPH-MCNC: 3.4 GM/DL
GLUCOSE SERPL-MCNC: 129 MG/DL (ref 65–99)
HCT VFR BLD AUTO: 38.8 % (ref 34–46.6)
HGB BLD-MCNC: 12.5 G/DL (ref 12–15.9)
IMM GRANULOCYTES # BLD AUTO: 0.03 10*3/MM3 (ref 0–0.05)
IMM GRANULOCYTES NFR BLD AUTO: 0.3 % (ref 0–0.5)
LYMPHOCYTES # BLD AUTO: 1.2 10*3/MM3 (ref 0.7–3.1)
LYMPHOCYTES NFR BLD AUTO: 11.6 % (ref 19.6–45.3)
MCH RBC QN AUTO: 29.2 PG (ref 26.6–33)
MCHC RBC AUTO-ENTMCNC: 32.2 G/DL (ref 31.5–35.7)
MCV RBC AUTO: 90.7 FL (ref 79–97)
MONOCYTES # BLD AUTO: 0.81 10*3/MM3 (ref 0.1–0.9)
MONOCYTES NFR BLD AUTO: 7.8 % (ref 5–12)
NEUTROPHILS NFR BLD AUTO: 79.7 % (ref 42.7–76)
NEUTROPHILS NFR BLD AUTO: 8.27 10*3/MM3 (ref 1.7–7)
NRBC BLD AUTO-RTO: 0 /100 WBC (ref 0–0.2)
NT-PROBNP SERPL-MCNC: ABNORMAL PG/ML (ref 0–1800)
PLATELET # BLD AUTO: 198 10*3/MM3 (ref 140–450)
PMV BLD AUTO: 12.6 FL (ref 6–12)
POTASSIUM SERPL-SCNC: 3.8 MMOL/L (ref 3.5–5.2)
PROT SERPL-MCNC: 6.9 G/DL (ref 6–8.5)
RBC # BLD AUTO: 4.28 10*6/MM3 (ref 3.77–5.28)
SODIUM SERPL-SCNC: 145 MMOL/L (ref 136–145)
WBC NRBC COR # BLD: 10.37 10*3/MM3 (ref 3.4–10.8)

## 2023-07-25 PROCEDURE — 96374 THER/PROPH/DIAG INJ IV PUSH: CPT

## 2023-07-25 PROCEDURE — 80053 COMPREHEN METABOLIC PANEL: CPT | Performed by: EMERGENCY MEDICINE

## 2023-07-25 PROCEDURE — 71045 X-RAY EXAM CHEST 1 VIEW: CPT

## 2023-07-25 PROCEDURE — 73110 X-RAY EXAM OF WRIST: CPT

## 2023-07-25 PROCEDURE — 99284 EMERGENCY DEPT VISIT MOD MDM: CPT

## 2023-07-25 PROCEDURE — 25010000002 HYDROMORPHONE 1 MG/ML SOLUTION: Performed by: EMERGENCY MEDICINE

## 2023-07-25 PROCEDURE — 85025 COMPLETE CBC W/AUTO DIFF WBC: CPT | Performed by: EMERGENCY MEDICINE

## 2023-07-25 PROCEDURE — 82550 ASSAY OF CK (CPK): CPT | Performed by: EMERGENCY MEDICINE

## 2023-07-25 PROCEDURE — 83880 ASSAY OF NATRIURETIC PEPTIDE: CPT

## 2023-07-25 RX ORDER — SODIUM CHLORIDE 0.9 % (FLUSH) 0.9 %
10 SYRINGE (ML) INJECTION AS NEEDED
Status: DISCONTINUED | OUTPATIENT
Start: 2023-07-25 | End: 2023-07-26 | Stop reason: HOSPADM

## 2023-07-25 RX ADMIN — HYDROMORPHONE HYDROCHLORIDE 1 MG: 1 INJECTION, SOLUTION INTRAMUSCULAR; INTRAVENOUS; SUBCUTANEOUS at 21:24

## 2023-07-25 RX ADMIN — SODIUM CHLORIDE 500 ML: 9 INJECTION, SOLUTION INTRAVENOUS at 23:15

## 2023-07-25 NOTE — ED PROVIDER NOTES
Time: 7:55 PM EDT  Date of encounter:  7/25/2023  Independent Historian/Clinical History and Information was obtained by:   Patient and Family    History is limited by: N/A    Chief Complaint   Patient presents with    Fall    Arm Injury         History of Present Illness:  Patient is a 82 y.o. year old female who presents to the emergency department for evaluation of right wrist injury.  Patient states she was taking out her trash and slipped and fell.  Patient states that she did lay on the porch for approximately 3 hours before neighbors were able to help her get inside.  (Provider in triage, Sean Peters PA-C)    HPI    Patient Care Team  Primary Care Provider: Brenda Son APRN    Past Medical History:     Allergies   Allergen Reactions    Contrast Dye (Echo Or Unknown Ct/Mr) Unknown - High Severity     Category: IV Contrast Dyes;       Penicillins Rash    Ibandronic Acid Nausea Only and Unknown - High Severity     Past Medical History:   Diagnosis Date    Benign essential hypertension     CKD (chronic kidney disease)     GERD (gastroesophageal reflux disease)     Gout     HLD (hyperlipidemia)     Hypothyroidism      Past Surgical History:   Procedure Laterality Date    CHOLECYSTECTOMY      HYSTERECTOMY       Family History   Problem Relation Age of Onset    Arthritis Father     Colon cancer Father     Colon cancer Brother        Home Medications:  Prior to Admission medications    Medication Sig Start Date End Date Taking? Authorizing Provider   allopurinol (ZYLOPRIM) 100 MG tablet Take 1 tablet by mouth Daily. 6/8/23   Yessy Arrieta APRN   aspirin 81 MG EC tablet Aspir-81 81 mg oral tablet,delayed release (DR/EC) take 1 tablet (81 mg) by oral route once daily   Suspended    Provider, MD Maurizio   atorvastatin (LIPITOR) 40 MG tablet Take 1 tablet by mouth Daily. 6/8/23   Yessy Arrieta APRN   Blood Glucose Monitoring Suppl (FreeStyle Lite) device 1 each Daily. 9/28/22   Huy  [facility] Galeana   CHERRY PO Take  by mouth. Johnson gels at  for the gout pain OTC    ProviderMaurizio MD   clopidogrel (PLAVIX) 75 MG tablet Take 1 tablet by mouth Daily. 6/8/23   Yessy Arrieta APRN   dapagliflozin Propanediol 10 MG tablet Take 10 mg by mouth Daily. Per Dr Luis    ProviderMaurizio MD   Diclofenac Sodium (VOLTAREN) 1 % gel gel Apply 4 g topically to the appropriate area as directed 4 (Four) Times a Day As Needed (OA joint pain). 6/8/23   Yessy Arrieta APRN   glucose blood test strip 1 each by Other route Daily. Use as instructed 6/8/23   Yessy Arrieta APRN   Lancets (freestyle) lancets 1 each by Other route Daily. Use as instructed 6/8/23   Yessy Arrieta APRN   levothyroxine (SYNTHROID, LEVOTHROID) 75 MCG tablet Take 1 tablet by mouth Every Morning. 6/8/23   Yessy Arrieta APRN   metoprolol succinate XL (TOPROL-XL) 50 MG 24 hr tablet Take 1 tablet by mouth 2 (Two) Times a Day. 6/8/23   Yessy Arrieta APRN   nitroglycerin (NITROSTAT) 0.4 MG SL tablet Take 1 tablet by mouth Every 5 (Five) Minutes As Needed for Chest Pain. 6/8/23   Yessy Arrieta APRN   pantoprazole (PROTONIX) 40 MG EC tablet Take 1 tablet by mouth 2 (Two) Times a Day. 6/8/23   Yessy Arrieta APRN   pregabalin (Lyrica) 75 MG capsule Take 1 capsule by mouth Every Night. 6/8/23   Yessy Arrieta APRN   sacubitril-valsartan (ENTRESTO) 24-26 MG tablet Take 1 tablet by mouth 2 (Two) Times a Day. 9/30/21   ProviderMaurizio MD   traMADol (Ultram) 50 MG tablet Take 1 tablet by mouth Every 8 (Eight) Hours As Needed for Moderate Pain. 6/8/23   Yessy Arrieta APRN        Social History:   Social History     Tobacco Use    Smoking status: Former     Packs/day: 0.50     Years: 5.00     Pack years: 2.50     Types: Cigarettes    Smokeless tobacco: Never   Vaping Use    Vaping Use: Never used   Substance Use Topics    Alcohol use: Never    Drug use: Never         Review of  "Systems:  Review of Systems   Constitutional:  Negative for fever.   HENT:  Negative for sore throat.    Eyes: Negative.    Respiratory:  Negative for cough and shortness of breath.    Cardiovascular:  Negative for chest pain.   Gastrointestinal:  Negative for abdominal pain, diarrhea and vomiting.   Genitourinary:  Negative for dysuria.   Musculoskeletal:  Positive for arthralgias. Negative for neck pain.   Skin:  Negative for rash.   Allergic/Immunologic: Negative.    Neurological:  Negative for weakness, numbness and headaches.   Hematological: Negative.    Psychiatric/Behavioral: Negative.     All other systems reviewed and are negative.     Physical Exam:  /79   Pulse 75   Temp 98.7 °F (37.1 °C) (Oral)   Resp 18   Ht 152.4 cm (60\")   Wt 72.6 kg (160 lb 0.9 oz)   SpO2 93%   BMI 31.26 kg/m²         Physical Exam  Vitals and nursing note reviewed.   Constitutional:       General: She is not in acute distress.     Appearance: Normal appearance. She is not toxic-appearing.   HENT:      Head: Normocephalic and atraumatic.      Jaw: There is normal jaw occlusion.      Mouth/Throat:      Mouth: Mucous membranes are moist.   Eyes:      General: Lids are normal.      Extraocular Movements: Extraocular movements intact.      Conjunctiva/sclera: Conjunctivae normal.      Pupils: Pupils are equal, round, and reactive to light.   Cardiovascular:      Rate and Rhythm: Normal rate and regular rhythm.      Pulses: Normal pulses.      Heart sounds: Normal heart sounds.   Pulmonary:      Effort: Pulmonary effort is normal. No respiratory distress.      Breath sounds: Normal breath sounds. No wheezing or rhonchi.   Abdominal:      General: Abdomen is flat. There is no distension.      Palpations: Abdomen is soft.      Tenderness: There is no abdominal tenderness. There is no guarding or rebound.   Musculoskeletal:         General: Swelling, tenderness, deformity and signs of injury present.      Right wrist: Swelling, " deformity and tenderness present.      Cervical back: Normal range of motion and neck supple.      Right lower leg: No edema.      Left lower leg: No edema.      Comments: Deformity in the right distal forearm and wrist, cap refill <2 sec, splint and sling in place.   Skin:     General: Skin is warm and dry.   Neurological:      General: No focal deficit present.      Mental Status: She is alert and oriented to person, place, and time. Mental status is at baseline.   Psychiatric:         Mood and Affect: Mood normal.         Behavior: Behavior normal.                  Procedures:  Procedures      Medical Decision Making:      Comorbidities that affect care:    Chronic Kidney Disease, Congestive Heart Failure, Hypertension    External Notes reviewed:    Previous Clinic Note: Family medicine visit from 6/8/2023      The following orders were placed and all results were independently analyzed by me:  Orders Placed This Encounter   Procedures    Splint Application    XR Wrist 3+ View Right    XR Chest 1 View    Comprehensive Metabolic Panel    CK    CBC Auto Differential    BNP    Insert Peripheral IV    CBC & Differential       Medications Given in the Emergency Department:  Medications   sodium chloride 0.9 % flush 10 mL (has no administration in time range)   HYDROmorphone (DILAUDID) injection 1 mg (1 mg Intravenous Given 7/25/23 2124)   sodium chloride 0.9 % bolus 500 mL (0 mL Intravenous Stopped 7/26/23 0157)        ED Course:    The patient was initially evaluated in the triage area where orders were placed. The patient was later dispositioned by [facility] Lutz.      The patient was advised to stay for completion of workup which includes but is not limited to communication of labs and radiological results, reassessment and plan. The patient was advised that leaving prior to disposition by a provider could result in critical findings that are not communicated to the patient.     ED Course as of 07/26/23 0157    Tue Jul 25, 2023 1955 PROVIDER IN TRIAGE  Patient was evaluated by me in triage, Sean Peters PA-C.  Orders were placed and patient is currently awaiting final results and disposition.     Deformity of right wrist is noted in triage and patient was placed in temporary splint and sling [MD]   Wed Jul 26, 2023   0150 Consulted with Dr. Lawson with Ortho via text messaging service, sent him x-ray imaging at 2341.  Never received confirmation, will dispo patient to follow up in office. [RM]      ED Course User Index  [MD] Sean Peters PA-C  [RM] Becky Cesar APRN       Labs:    Lab Results (last 24 hours)       Procedure Component Value Units Date/Time    CBC & Differential [[id number]]  (Abnormal) Collected: 07/25/23 2109    Specimen: Blood Updated: 07/25/23 2122    Narrative:      The following orders were created for panel order CBC & Differential.  Procedure                               Abnormality         Status                     ---------                               -----------         ------                     CBC Auto Differential[[id number]]        Abnormal            Final result                 Please view results for these tests on the individual orders.    Comprehensive Metabolic Panel [[id number]]  (Abnormal) Collected: 07/25/23 2109    Specimen: Blood Updated: 07/25/23 2141     Glucose 129 mg/dL      BUN 26 mg/dL      Creatinine 1.45 mg/dL      Sodium 145 mmol/L      Potassium 3.8 mmol/L      Comment: Slight hemolysis detected by analyzer. Results may be affected.        Chloride 110 mmol/L      CO2 24.0 mmol/L      Calcium 10.3 mg/dL      Total Protein 6.9 g/dL      Albumin 3.5 g/dL      ALT (SGPT) 24 U/L      AST (SGOT) 50 U/L      Alkaline Phosphatase 96 U/L      Total Bilirubin 0.7 mg/dL      Globulin 3.4 gm/dL      A/G Ratio 1.0 g/dL      BUN/Creatinine Ratio 17.9     Anion Gap 11.0 mmol/L      eGFR 36.5 mL/min/1.73     Narrative:      GFR Normal >60  Chronic Kidney Disease  <60  Kidney Failure <15    The GFR formula is only valid for adults with stable renal function between ages 18 and 70.    CK [[id number]]  (Abnormal) Collected: 07/25/23 2109    Specimen: Blood Updated: 07/25/23 2141     Creatine Kinase 300 U/L     CBC Auto Differential [[id number]]  (Abnormal) Collected: 07/25/23 2109    Specimen: Blood Updated: 07/25/23 2122     WBC 10.37 10*3/mm3      RBC 4.28 10*6/mm3      Hemoglobin 12.5 g/dL      Hematocrit 38.8 %      MCV 90.7 fL      MCH 29.2 pg      MCHC 32.2 g/dL      RDW 15.4 %      RDW-SD 50.5 fl      MPV 12.6 fL      Platelets 198 10*3/mm3      Neutrophil % 79.7 %      Lymphocyte % 11.6 %      Monocyte % 7.8 %      Eosinophil % 0.3 %      Basophil % 0.3 %      Immature Grans % 0.3 %      Neutrophils, Absolute 8.27 10*3/mm3      Lymphocytes, Absolute 1.20 10*3/mm3      Monocytes, Absolute 0.81 10*3/mm3      Eosinophils, Absolute 0.03 10*3/mm3      Basophils, Absolute 0.03 10*3/mm3      Immature Grans, Absolute 0.03 10*3/mm3      nRBC 0.0 /100 WBC     BNP [[id number]]  (Abnormal) Collected: 07/25/23 2109    Specimen: Blood Updated: 07/25/23 2312     proBNP 11,004.0 pg/mL     Narrative:      Among patients with dyspnea, NT-proBNP is highly sensitive for the detection of acute congestive heart failure. In addition NT-proBNP of <300 pg/ml effectively rules out acute congestive heart failure with 99% negative predictive value.    Results may be falsely decreased if patient taking Biotin.               Imaging:    XR Wrist 3+ View Right    Result Date: 7/25/2023  PROCEDURE: XR WRIST 3+ VW RIGHT  COMPARISON: None  INDICATIONS: RIGHT WRIST PAIN, SWELLING, DEFORMITY/ FALL  FINDINGS:  There is a mildly comminuted fracture of the distal radial metaphysis.  There is foreshortening of the radius as well as radial angulation of the radiocarpal articulation.  No definite intra-articular extension is identified.  No significant joint surface incongruity is identified.  There is a fracture  of the ulnar styloid process base.  The process is displaced radially 0.8 cm.  Distal forearm soft tissue swelling is noted.  The bones appear diffusely osteopenic.         1. Mildly comminuted distal radial fracture 2. Fracture of the ulnar styloid process 3. Distal forearm soft tissue swelling      John Salgado M.D.       Electronically Signed and Approved By: John Salgado M.D. on 7/25/2023 at 20:55             XR Chest 1 View    Result Date: 7/25/2023  PROCEDURE: XR CHEST 1 VW  COMPARISON: None  INDICATIONS: CHF, COUGH  FINDINGS:  Cardiac silhouette appears enlarged, even allowing for portable AP technique.  ICD leads appear in expected position.  Lungs are adequately expanded and clear.  No consolidation or large pleural effusion is seen.        1. Enlarged cardiac silhouette. 2. No acute pulmonary abnormality seen.       BRITNEY ROTH MD       Electronically Signed and Approved By: BRITNEY ROTH MD on 7/25/2023 at 23:54                Differential Diagnosis and Discussion:      Extremity Pain: Differential diagnosis includes but is not limited to soft tissue sprain, tendonitis, tendon injury, dislocation, fracture, deep vein thrombosis, arterial insufficiency, osteoarthritis, bursitis, and ligamentous damage.    All labs were reviewed and interpreted by me.  All X-rays impressions were independently interpreted by me.    MDM     Amount and/or Complexity of Data Reviewed  Clinical lab tests: reviewed  Tests in the radiology section of CPT®: reviewed  Decide to obtain previous medical records or to obtain history from someone other than the patient: yes             Patient Care Considerations:    CONSULT: I considered consulting Dr. Lawson, however is sent imaging via text messaging but got no response back.  Sugar-tong splint applied and patient will follow-up with Dr. Bowie as outpatient.      Consultants/Shared Management Plan:    None    Social Determinants of Health:    Patient is independent,  reliable, and has access to care.       Disposition and Care Coordination:    Discharged: The patient is suitable and stable for discharge with no need for consideration of observation or admission.    I have explained the patient´s condition, diagnoses and treatment plan based on the information available to me at this time. I have answered questions and addressed any concerns. The patient has a good  understanding of the patient´s diagnosis, condition, and treatment plan as can be expected at this point. The vital signs have been stable. The patient´s condition is stable and appropriate for discharge from the emergency department.      The patient will pursue further outpatient evaluation with the primary care physician or other designated or consulting physician as outlined in the discharge instructions. They are agreeable to this plan of care and follow-up instructions have been explained in detail. The patient has received these instructions in written format and have expressed an understanding of the discharge instructions. The patient is aware that any significant change in condition or worsening of symptoms should prompt an immediate return to this or the closest emergency department or call to 911.  I have explained discharge medications and the need for follow up with the patient/caretakers. This was also printed in the discharge instructions. Patient was discharged with the following medications and follow up:      Medication List        New Prescriptions      HYDROcodone-acetaminophen 5-325 MG per tablet  Commonly known as: NORCO  Take 1 tablet by mouth Every 6 (Six) Hours As Needed for Moderate Pain.               Where to Get Your Medications        These medications were sent to [facility]/pharmacy #[id number] - [place], [place] - [address] - [phone] [facility] [phone] FX  [address] [place] [address]      Hours: 24-hours Phone: [phone]   HYDROcodone-acetaminophen 5-325 MG per  Symone Flores MD  [address] [facility]  [place] [address]  [phone]    Schedule an appointment as soon as possible for a visit          Final diagnoses:   Closed fracture of distal end of right radius, unspecified fracture morphology, initial encounter        ED Disposition       ED Disposition   Discharge    Condition   Stable    Comment   --               This medical record created using voice recognition software.             Becky Cesar, APRN  07/26/23 0157

## 2023-07-26 VITALS
SYSTOLIC BLOOD PRESSURE: 138 MMHG | DIASTOLIC BLOOD PRESSURE: 79 MMHG | RESPIRATION RATE: 18 BRPM | WEIGHT: 160.05 LBS | BODY MASS INDEX: 31.42 KG/M2 | OXYGEN SATURATION: 93 % | HEIGHT: 60 IN | HEART RATE: 75 BPM | TEMPERATURE: 98.7 F

## 2023-07-26 RX ORDER — HYDROCODONE BITARTRATE AND ACETAMINOPHEN 5; 325 MG/1; MG/1
1 TABLET ORAL EVERY 6 HOURS PRN
Qty: 12 TABLET | Refills: 0 | Status: SHIPPED | OUTPATIENT
Start: 2023-07-26 | End: 2023-07-28

## 2023-07-26 NOTE — ED NOTES
Stumbled backwards trying to get into back door and fell.  Was outside in heat for 3 hours until found by neighbor.  Fell on bottom and threw hand back and injured right wrist.  Denies hitting head or LOC.

## 2023-07-26 NOTE — DISCHARGE INSTRUCTIONS
Your x-ray showed a fracture in your right wrist.  You should follow-up with Dr. Lawson with Ortho.  Take your pain medication as prescribed or as needed for pain.  Keep your splint clean and dry.

## 2023-07-28 ENCOUNTER — OFFICE VISIT (OUTPATIENT)
Dept: ORTHOPEDIC SURGERY | Facility: CLINIC | Age: 83
End: 2023-07-28
Payer: MEDICARE

## 2023-07-28 ENCOUNTER — TELEPHONE (OUTPATIENT)
Dept: ORTHOPEDIC SURGERY | Facility: CLINIC | Age: 83
End: 2023-07-28

## 2023-07-28 ENCOUNTER — PREP FOR SURGERY (OUTPATIENT)
Dept: OTHER | Facility: HOSPITAL | Age: 83
End: 2023-07-28
Payer: MEDICARE

## 2023-07-28 VITALS — HEIGHT: 60 IN | BODY MASS INDEX: 31.41 KG/M2 | WEIGHT: 160 LBS

## 2023-07-28 DIAGNOSIS — S52.501A CLOSED FRACTURE OF DISTAL END OF RIGHT RADIUS, UNSPECIFIED FRACTURE MORPHOLOGY, INITIAL ENCOUNTER: Primary | ICD-10-CM

## 2023-07-28 DIAGNOSIS — S52.611A CLOSED DISPLACED FRACTURE OF STYLOID PROCESS OF RIGHT ULNA, INITIAL ENCOUNTER: ICD-10-CM

## 2023-07-28 RX ORDER — CEFAZOLIN SODIUM 2 G/100ML
2 INJECTION, SOLUTION INTRAVENOUS ONCE
OUTPATIENT
Start: 2023-07-28 | End: 2023-07-28

## 2023-07-28 RX ORDER — HYDROCODONE BITARTRATE AND ACETAMINOPHEN 5; 325 MG/1; MG/1
1 TABLET ORAL EVERY 6 HOURS PRN
Qty: 21 TABLET | Refills: 0 | Status: SHIPPED | OUTPATIENT
Start: 2023-07-28

## 2023-07-28 RX ORDER — CEFAZOLIN SODIUM IN 0.9 % NACL 3 G/100 ML
3 INTRAVENOUS SOLUTION, PIGGYBACK (ML) INTRAVENOUS ONCE
OUTPATIENT
Start: 2023-07-28 | End: 2023-07-28

## 2023-07-28 NOTE — PROGRESS NOTES
"Chief Complaint  Initial Evaluation of the Right Wrist     Subjective      Nicole Holliday presents to [facility] for initial evaluation of the right wrist.  She has a sliding door and then she was trying to close it and she slipped and fell.  The injury was 7/25/23.  She went to the ED and had X rays and placed in a long arm splint and sling.  She is here today for treatment intervention. She is on Plavix and Aspirin.     Allergies   Allergen Reactions    Contrast Dye (Echo Or Unknown Ct/Mr) Unknown - High Severity     Category: IV Contrast Dyes;       Penicillins Rash    Ibandronic Acid Nausea Only and Unknown - High Severity        Social History     Socioeconomic History    Marital status:    Tobacco Use    Smoking status: Former     Packs/day: 0.50     Years: 5.00     Pack years: 2.50     Types: Cigarettes    Smokeless tobacco: Never   Vaping Use    Vaping Use: Never used   Substance and Sexual Activity    Alcohol use: Never    Drug use: Never    Sexual activity: Not Currently        Review of Systems     Objective   Vital Signs:   Ht 152.4 cm (60\")   Wt 72.6 kg (160 lb)   BMI 31.25 kg/m²       Physical Exam  Constitutional:       Appearance: Normal appearance. Patient is well-developed and normal weight.   HENT:      Head: Normocephalic.      Right Ear: Hearing and external ear normal.      Left Ear: Hearing and external ear normal.      Nose: Nose normal.   Eyes:      Conjunctiva/sclera: Conjunctivae normal.   Cardiovascular:      Rate and Rhythm: Normal rate.   Pulmonary:      Effort: Pulmonary effort is normal.      Breath sounds: No wheezing or rales.   Abdominal:      Palpations: Abdomen is soft.      Tenderness: There is no abdominal tenderness.   Musculoskeletal:      Cervical back: Normal range of motion.   Skin:     Findings: No rash.   Neurological:      Mental Status: Patient is alert and oriented to person, place, and time.   Psychiatric:         Mood and " Affect: Mood and affect normal.         Judgment: Judgment normal.       Ortho Exam      RIGHT WRIST Long arm cast and sling. Sensation grossly intact to light touch, median, radial and ulnar nerve. Positive AIN, PIN and ulnar nerve motor function intact. Axillary nerve intact. Positive pulses.     Procedures      Imaging Results (Most Recent)       None             Result Review :         XR Wrist 3+ View Right    Result Date: 7/25/2023  Narrative: PROCEDURE: XR WRIST 3+ VW RIGHT  COMPARISON: None  INDICATIONS: RIGHT WRIST PAIN, SWELLING, DEFORMITY/ FALL  FINDINGS:  There is a mildly comminuted fracture of the distal radial metaphysis.  There is foreshortening of the radius as well as radial angulation of the radiocarpal articulation.  No definite intra-articular extension is identified.  No significant joint surface incongruity is identified.  There is a fracture of the ulnar styloid process base.  The process is displaced radially 0.8 cm.  Distal forearm soft tissue swelling is noted.  The bones appear diffusely osteopenic.       Impression:   1. Mildly comminuted distal radial fracture 2. Fracture of the ulnar styloid process 3. Distal forearm soft tissue swelling      John Salgado M.D.       Electronically Signed and Approved By: John Salgado M.D. on 7/25/2023 at 20:55             XR Chest 1 View    Result Date: 7/25/2023  Narrative: PROCEDURE: XR CHEST 1 VW  COMPARISON: None  INDICATIONS: CHF, COUGH  FINDINGS:  Cardiac silhouette appears enlarged, even allowing for portable AP technique.  ICD leads appear in expected position.  Lungs are adequately expanded and clear.  No consolidation or large pleural effusion is seen.      Impression:   1. Enlarged cardiac silhouette. 2. No acute pulmonary abnormality seen.       BRITNEY ROTH MD       Electronically Signed and Approved By: BRITNEY ROTH MD on 7/25/2023 at 23:54                     Assessment and Plan     Diagnoses and all orders for this  visit:    1. Closed fracture of distal end of right radius, unspecified fracture morphology, initial encounter (Primary)    2. Closed displaced fracture of styloid process of right ulna, initial encounter        Discussed the treatment plan with the patient. I reviewed the X-rays that were obtained 7/25/23 with the patient.     Discussed the treatment options with the patient, operative vs non-operative.     The patient expressed understanding and wished to proceed with open reduction and internal fixation of right distal radius fracture.     Prescribed pain medication.        Discussed surgery., Risks/benefits discussed with patient including, but not limited to: infection, bleeding, neurovascular damage, malunion, nonunion, aesthetic deformity, need for further surgery, and death., Discussed with patient the implant type being used during surgery and patient understands., Surgery pamphlet given., and Call or return if worsening symptoms.    Follow Up     2 weeks postoperatively       Patient was given instructions and counseling regarding her condition or for health maintenance advice. Please see specific information pulled into the AVS if appropriate.     Scribed for Symone Lawson MD by Shelly Johnson MA.  07/28/23   10:15 EDT    I have personally performed the services described in this document as scribed by the above individual and it is both accurate and complete. Symone Lawson MD 07/28/23 Answers submitted by the patient for this visit:  Other (Submitted on 7/28/2023)  Please describe your symptoms.: Broken arm  Have you had these symptoms before?: No  How long have you been having these symptoms?: 1-4 days  Please list any medications you are currently taking for this condition.: Regular meds plus Hydrocodone  Please describe any probable cause for these symptoms. : Fall  Primary Reason for Visit (Submitted on 7/28/2023)  What is the primary reason for your visit?: Other

## 2023-07-28 NOTE — H&P (VIEW-ONLY)
"Chief Complaint  Initial Evaluation of the Right Wrist     Subjective      Nicole Holliday presents to [facility] for initial evaluation of the right wrist.  She has a sliding door and then she was trying to close it and she slipped and fell.  The injury was 7/25/23.  She went to the ED and had X rays and placed in a long arm splint and sling.  She is here today for treatment intervention. She is on Plavix and Aspirin.     Allergies   Allergen Reactions    Contrast Dye (Echo Or Unknown Ct/Mr) Unknown - High Severity     Category: IV Contrast Dyes;       Penicillins Rash    Ibandronic Acid Nausea Only and Unknown - High Severity        Social History     Socioeconomic History    Marital status:    Tobacco Use    Smoking status: Former     Packs/day: 0.50     Years: 5.00     Pack years: 2.50     Types: Cigarettes    Smokeless tobacco: Never   Vaping Use    Vaping Use: Never used   Substance and Sexual Activity    Alcohol use: Never    Drug use: Never    Sexual activity: Not Currently        Review of Systems     Objective   Vital Signs:   Ht 152.4 cm (60\")   Wt 72.6 kg (160 lb)   BMI 31.25 kg/mý       Physical Exam  Constitutional:       Appearance: Normal appearance. Patient is well-developed and normal weight.   HENT:      Head: Normocephalic.      Right Ear: Hearing and external ear normal.      Left Ear: Hearing and external ear normal.      Nose: Nose normal.   Eyes:      Conjunctiva/sclera: Conjunctivae normal.   Cardiovascular:      Rate and Rhythm: Normal rate.   Pulmonary:      Effort: Pulmonary effort is normal.      Breath sounds: No wheezing or rales.   Abdominal:      Palpations: Abdomen is soft.      Tenderness: There is no abdominal tenderness.   Musculoskeletal:      Cervical back: Normal range of motion.   Skin:     Findings: No rash.   Neurological:      Mental Status: Patient is alert and oriented to person, place, and time.   Psychiatric:         Mood and " Affect: Mood and affect normal.         Judgment: Judgment normal.       Ortho Exam      RIGHT WRIST Long arm cast and sling. Sensation grossly intact to light touch, median, radial and ulnar nerve. Positive AIN, PIN and ulnar nerve motor function intact. Axillary nerve intact. Positive pulses.     Procedures      Imaging Results (Most Recent)       None             Result Review :         XR Wrist 3+ View Right    Result Date: 7/25/2023  Narrative: PROCEDURE: XR WRIST 3+ VW RIGHT  COMPARISON: None  INDICATIONS: RIGHT WRIST PAIN, SWELLING, DEFORMITY/ FALL  FINDINGS:  There is a mildly comminuted fracture of the distal radial metaphysis.  There is foreshortening of the radius as well as radial angulation of the radiocarpal articulation.  No definite intra-articular extension is identified.  No significant joint surface incongruity is identified.  There is a fracture of the ulnar styloid process base.  The process is displaced radially 0.8 cm.  Distal forearm soft tissue swelling is noted.  The bones appear diffusely osteopenic.       Impression:   1. Mildly comminuted distal radial fracture 2. Fracture of the ulnar styloid process 3. Distal forearm soft tissue swelling      John Salgado M.D.       Electronically Signed and Approved By: John Salgado M.D. on 7/25/2023 at 20:55             XR Chest 1 View    Result Date: 7/25/2023  Narrative: PROCEDURE: XR CHEST 1 VW  COMPARISON: None  INDICATIONS: CHF, COUGH  FINDINGS:  Cardiac silhouette appears enlarged, even allowing for portable AP technique.  ICD leads appear in expected position.  Lungs are adequately expanded and clear.  No consolidation or large pleural effusion is seen.      Impression:   1. Enlarged cardiac silhouette. 2. No acute pulmonary abnormality seen.       BRITNEY ROTH MD       Electronically Signed and Approved By: BRITNEY ROTH MD on 7/25/2023 at 23:54                     Assessment and Plan     Diagnoses and all orders for this  visit:    1. Closed fracture of distal end of right radius, unspecified fracture morphology, initial encounter (Primary)    2. Closed displaced fracture of styloid process of right ulna, initial encounter        Discussed the treatment plan with the patient. I reviewed the X-rays that were obtained 7/25/23 with the patient.     Discussed the treatment options with the patient, operative vs non-operative.     The patient expressed understanding and wished to proceed with open reduction and internal fixation of right distal radius fracture.     Prescribed pain medication.        Discussed surgery., Risks/benefits discussed with patient including, but not limited to: infection, bleeding, neurovascular damage, malunion, nonunion, aesthetic deformity, need for further surgery, and death., Discussed with patient the implant type being used during surgery and patient understands., Surgery pamphlet given., and Call or return if worsening symptoms.    Follow Up     2 weeks postoperatively       Patient was given instructions and counseling regarding her condition or for health maintenance advice. Please see specific information pulled into the AVS if appropriate.     Scribed for Symone Lawson MD by Shelly Johnson MA.  07/28/23   10:15 EDT    I have personally performed the services described in this document as scribed by the above individual and it is both accurate and complete. Symone Lawson MD 07/28/23 Answers submitted by the patient for this visit:  Other (Submitted on 7/28/2023)  Please describe your symptoms.: Broken arm  Have you had these symptoms before?: No  How long have you been having these symptoms?: 1-4 days  Please list any medications you are currently taking for this condition.: Regular meds plus Hydrocodone  Please describe any probable cause for these symptoms. : Fall  Primary Reason for Visit (Submitted on 7/28/2023)  What is the primary reason for your visit?: Other

## 2023-07-28 NOTE — TELEPHONE ENCOUNTER
PATIENT'S DAUGHTER WAS CALLED AND INFORMED PER  DR AMANDA THAT PATIENT COULD HOLD/STOP PLAVIX AND ASPIRIN.   JOSÉ LUIS, DAUGHTER STATES DR AMANDA CALLED HER AND TALKED TO HER.

## 2023-07-31 NOTE — PRE-PROCEDURE INSTRUCTIONS
Patient instructed to have no food past midnight, clears up to 2 hours prior to arrival time. Patient instructed to wear no lotions, jewelry or piercing's day of surgery. Patient to take allopurinol, lipitor, levothyroxine, metoprolol, pantoprazole,  norco and tramadol if needed.

## 2023-08-01 ENCOUNTER — ANESTHESIA EVENT (OUTPATIENT)
Dept: PERIOP | Facility: HOSPITAL | Age: 83
End: 2023-08-01
Payer: MEDICARE

## 2023-08-02 ENCOUNTER — HOSPITAL ENCOUNTER (OUTPATIENT)
Facility: HOSPITAL | Age: 83
Discharge: HOME | End: 2023-08-02
Attending: ORTHOPAEDIC SURGERY | Admitting: ORTHOPAEDIC SURGERY
Payer: MEDICARE

## 2023-08-02 ENCOUNTER — ANESTHESIA EVENT CONVERTED (OUTPATIENT)
Dept: ANESTHESIOLOGY | Facility: HOSPITAL | Age: 83
End: 2023-08-02
Payer: MEDICARE

## 2023-08-02 ENCOUNTER — APPOINTMENT (OUTPATIENT)
Dept: GENERAL RADIOLOGY | Facility: HOSPITAL | Age: 83
End: 2023-08-02
Payer: MEDICARE

## 2023-08-02 ENCOUNTER — ANESTHESIA (OUTPATIENT)
Dept: PERIOP | Facility: HOSPITAL | Age: 83
End: 2023-08-02
Payer: MEDICARE

## 2023-08-02 VITALS
TEMPERATURE: 97.2 F | WEIGHT: 157.41 LBS | RESPIRATION RATE: 20 BRPM | SYSTOLIC BLOOD PRESSURE: 128 MMHG | OXYGEN SATURATION: 92 % | HEART RATE: 67 BPM | DIASTOLIC BLOOD PRESSURE: 64 MMHG | BODY MASS INDEX: 30.9 KG/M2 | HEIGHT: 60 IN

## 2023-08-02 DIAGNOSIS — S52.501A CLOSED FRACTURE OF DISTAL END OF RIGHT RADIUS, UNSPECIFIED FRACTURE MORPHOLOGY, INITIAL ENCOUNTER: ICD-10-CM

## 2023-08-02 LAB
GLUCOSE BLDC GLUCOMTR-MCNC: 110 MG/DL (ref 70–99)
GLUCOSE BLDC GLUCOMTR-MCNC: 112 MG/DL (ref 70–99)
QT INTERVAL: 460 MS

## 2023-08-02 PROCEDURE — 93010 ELECTROCARDIOGRAM REPORT: CPT | Performed by: INTERNAL MEDICINE

## 2023-08-02 PROCEDURE — C1713 ANCHOR/SCREW BN/BN,TIS/BN: HCPCS | Performed by: ORTHOPAEDIC SURGERY

## 2023-08-02 PROCEDURE — 93005 ELECTROCARDIOGRAM TRACING: CPT | Performed by: ANESTHESIOLOGY

## 2023-08-02 PROCEDURE — 25010000002 MIDAZOLAM PER 1MG: Performed by: ANESTHESIOLOGY

## 2023-08-02 PROCEDURE — 25010000002 CEFAZOLIN IN DEXTROSE 2000 MG/ 100 ML SOLUTION: Performed by: ORTHOPAEDIC SURGERY

## 2023-08-02 PROCEDURE — 82948 REAGENT STRIP/BLOOD GLUCOSE: CPT

## 2023-08-02 PROCEDURE — 25010000002 DEXAMETHASONE PER 1 MG: Performed by: NURSE ANESTHETIST, CERTIFIED REGISTERED

## 2023-08-02 PROCEDURE — 25609 OPTX DST RD XART FX/EP SEP3+: CPT | Performed by: ORTHOPAEDIC SURGERY

## 2023-08-02 PROCEDURE — 76000 FLUOROSCOPY <1 HR PHYS/QHP: CPT

## 2023-08-02 PROCEDURE — 25010000002 FENTANYL CITRATE (PF) 50 MCG/ML SOLUTION: Performed by: NURSE ANESTHETIST, CERTIFIED REGISTERED

## 2023-08-02 PROCEDURE — 25010000002 ONDANSETRON PER 1 MG: Performed by: NURSE ANESTHETIST, CERTIFIED REGISTERED

## 2023-08-02 PROCEDURE — A9270 NON-COVERED ITEM OR SERVICE: HCPCS | Performed by: ANESTHESIOLOGY

## 2023-08-02 PROCEDURE — 25010000002 PROPOFOL 10 MG/ML EMULSION: Performed by: NURSE ANESTHETIST, CERTIFIED REGISTERED

## 2023-08-02 PROCEDURE — 63710000001 ACETAMINOPHEN 500 MG TABLET: Performed by: ANESTHESIOLOGY

## 2023-08-02 DEVICE — LOCKING SCREW, FULLY THREADED,T8
Type: IMPLANTABLE DEVICE | Site: WRIST | Status: FUNCTIONAL
Brand: VARIAX

## 2023-08-02 DEVICE — BONE SCREW, FULLY THREADED, T8
Type: IMPLANTABLE DEVICE | Site: WRIST | Status: FUNCTIONAL
Brand: VARIAX

## 2023-08-02 DEVICE — DBX PUTTY, 1CC
Type: IMPLANTABLE DEVICE | Site: WRIST | Status: FUNCTIONAL
Brand: DBX®

## 2023-08-02 DEVICE — VOLAR SMARTLOCK DR PLATE,STAND.RT,LONG
Type: IMPLANTABLE DEVICE | Site: WRIST | Status: FUNCTIONAL
Brand: VARIAX

## 2023-08-02 RX ORDER — HYDROMORPHONE HYDROCHLORIDE 2 MG/1
2 TABLET ORAL EVERY 4 HOURS PRN
Status: DISCONTINUED | OUTPATIENT
Start: 2023-08-02 | End: 2023-08-02 | Stop reason: HOSPADM

## 2023-08-02 RX ORDER — PROPOFOL 10 MG/ML
VIAL (ML) INTRAVENOUS AS NEEDED
Status: DISCONTINUED | OUTPATIENT
Start: 2023-08-02 | End: 2023-08-02 | Stop reason: SURG

## 2023-08-02 RX ORDER — CEFAZOLIN SODIUM IN 0.9 % NACL 3 G/100 ML
3 INTRAVENOUS SOLUTION, PIGGYBACK (ML) INTRAVENOUS ONCE
Status: DISCONTINUED | OUTPATIENT
Start: 2023-08-02 | End: 2023-08-02 | Stop reason: DRUGHIGH

## 2023-08-02 RX ORDER — PROMETHAZINE HYDROCHLORIDE 12.5 MG/1
25 TABLET ORAL ONCE AS NEEDED
Status: DISCONTINUED | OUTPATIENT
Start: 2023-08-02 | End: 2023-08-02 | Stop reason: HOSPADM

## 2023-08-02 RX ORDER — MEPERIDINE HYDROCHLORIDE 25 MG/ML
12.5 INJECTION INTRAMUSCULAR; INTRAVENOUS; SUBCUTANEOUS
Status: DISCONTINUED | OUTPATIENT
Start: 2023-08-02 | End: 2023-08-02 | Stop reason: HOSPADM

## 2023-08-02 RX ORDER — ONDANSETRON 2 MG/ML
INJECTION INTRAMUSCULAR; INTRAVENOUS AS NEEDED
Status: DISCONTINUED | OUTPATIENT
Start: 2023-08-02 | End: 2023-08-02 | Stop reason: SURG

## 2023-08-02 RX ORDER — SODIUM CHLORIDE, SODIUM LACTATE, POTASSIUM CHLORIDE, CALCIUM CHLORIDE 600; 310; 30; 20 MG/100ML; MG/100ML; MG/100ML; MG/100ML
9 INJECTION, SOLUTION INTRAVENOUS CONTINUOUS PRN
Status: DISCONTINUED | OUTPATIENT
Start: 2023-08-02 | End: 2023-08-02 | Stop reason: HOSPADM

## 2023-08-02 RX ORDER — ACETAMINOPHEN 500 MG
1000 TABLET ORAL ONCE
Status: COMPLETED | OUTPATIENT
Start: 2023-08-02 | End: 2023-08-02

## 2023-08-02 RX ORDER — BUPIVACAINE HYDROCHLORIDE AND EPINEPHRINE 5; 5 MG/ML; UG/ML
INJECTION, SOLUTION EPIDURAL; INTRACAUDAL; PERINEURAL
Status: COMPLETED | OUTPATIENT
Start: 2023-08-02 | End: 2023-08-02

## 2023-08-02 RX ORDER — ONDANSETRON 2 MG/ML
4 INJECTION INTRAMUSCULAR; INTRAVENOUS ONCE AS NEEDED
Status: DISCONTINUED | OUTPATIENT
Start: 2023-08-02 | End: 2023-08-02 | Stop reason: HOSPADM

## 2023-08-02 RX ORDER — MIDAZOLAM HYDROCHLORIDE 2 MG/2ML
1 INJECTION, SOLUTION INTRAMUSCULAR; INTRAVENOUS ONCE
Status: COMPLETED | OUTPATIENT
Start: 2023-08-02 | End: 2023-08-02

## 2023-08-02 RX ORDER — FENTANYL CITRATE 50 UG/ML
INJECTION, SOLUTION INTRAMUSCULAR; INTRAVENOUS AS NEEDED
Status: DISCONTINUED | OUTPATIENT
Start: 2023-08-02 | End: 2023-08-02 | Stop reason: SURG

## 2023-08-02 RX ORDER — SODIUM CHLORIDE 9 MG/ML
40 INJECTION, SOLUTION INTRAVENOUS AS NEEDED
Status: DISCONTINUED | OUTPATIENT
Start: 2023-08-02 | End: 2023-08-02 | Stop reason: HOSPADM

## 2023-08-02 RX ORDER — CEFAZOLIN SODIUM 2 G/100ML
2 INJECTION, SOLUTION INTRAVENOUS ONCE
Status: COMPLETED | OUTPATIENT
Start: 2023-08-02 | End: 2023-08-02

## 2023-08-02 RX ORDER — PROMETHAZINE HYDROCHLORIDE 25 MG/1
25 SUPPOSITORY RECTAL ONCE AS NEEDED
Status: DISCONTINUED | OUTPATIENT
Start: 2023-08-02 | End: 2023-08-02 | Stop reason: HOSPADM

## 2023-08-02 RX ORDER — DEXAMETHASONE SODIUM PHOSPHATE 4 MG/ML
INJECTION, SOLUTION INTRA-ARTICULAR; INTRALESIONAL; INTRAMUSCULAR; INTRAVENOUS; SOFT TISSUE AS NEEDED
Status: DISCONTINUED | OUTPATIENT
Start: 2023-08-02 | End: 2023-08-02 | Stop reason: SURG

## 2023-08-02 RX ORDER — PHENYLEPHRINE HCL IN 0.9% NACL 1 MG/10 ML
SYRINGE (ML) INTRAVENOUS AS NEEDED
Status: DISCONTINUED | OUTPATIENT
Start: 2023-08-02 | End: 2023-08-02 | Stop reason: SURG

## 2023-08-02 RX ORDER — HYDROCODONE BITARTRATE AND ACETAMINOPHEN 7.5; 325 MG/1; MG/1
1 TABLET ORAL EVERY 4 HOURS PRN
Qty: 30 TABLET | Refills: 0 | Status: SHIPPED | OUTPATIENT
Start: 2023-08-02

## 2023-08-02 RX ORDER — LIDOCAINE HYDROCHLORIDE 20 MG/ML
INJECTION, SOLUTION EPIDURAL; INFILTRATION; INTRACAUDAL; PERINEURAL AS NEEDED
Status: DISCONTINUED | OUTPATIENT
Start: 2023-08-02 | End: 2023-08-02 | Stop reason: SURG

## 2023-08-02 RX ORDER — HYDROCODONE BITARTRATE AND ACETAMINOPHEN 7.5; 325 MG/1; MG/1
1 TABLET ORAL ONCE AS NEEDED
Status: DISCONTINUED | OUTPATIENT
Start: 2023-08-02 | End: 2023-08-02 | Stop reason: HOSPADM

## 2023-08-02 RX ADMIN — DEXAMETHASONE SODIUM PHOSPHATE 4 MG: 4 INJECTION, SOLUTION INTRAMUSCULAR; INTRAVENOUS at 10:52

## 2023-08-02 RX ADMIN — MIDAZOLAM HYDROCHLORIDE 1 MG: 1 INJECTION, SOLUTION INTRAMUSCULAR; INTRAVENOUS at 09:43

## 2023-08-02 RX ADMIN — PROPOFOL 50 MG: 10 INJECTION, EMULSION INTRAVENOUS at 10:48

## 2023-08-02 RX ADMIN — FENTANYL CITRATE 50 MCG: 50 INJECTION, SOLUTION INTRAMUSCULAR; INTRAVENOUS at 10:48

## 2023-08-02 RX ADMIN — BUPIVACAINE HYDROCHLORIDE AND EPINEPHRINE BITARTRATE 30 ML: 5; .005 INJECTION, SOLUTION EPIDURAL; INTRACAUDAL; PERINEURAL at 09:45

## 2023-08-02 RX ADMIN — CEFAZOLIN SODIUM 2 G: 2 INJECTION, SOLUTION INTRAVENOUS at 10:41

## 2023-08-02 RX ADMIN — LIDOCAINE HYDROCHLORIDE 100 MG: 20 INJECTION, SOLUTION EPIDURAL; INFILTRATION; INTRACAUDAL; PERINEURAL at 10:48

## 2023-08-02 RX ADMIN — ONDANSETRON 4 MG: 2 INJECTION INTRAMUSCULAR; INTRAVENOUS at 10:52

## 2023-08-02 RX ADMIN — FENTANYL CITRATE 25 MCG: 50 INJECTION, SOLUTION INTRAMUSCULAR; INTRAVENOUS at 11:51

## 2023-08-02 RX ADMIN — FENTANYL CITRATE 25 MCG: 50 INJECTION, SOLUTION INTRAMUSCULAR; INTRAVENOUS at 11:15

## 2023-08-02 RX ADMIN — Medication 100 MCG: at 11:20

## 2023-08-02 RX ADMIN — ACETAMINOPHEN 1000 MG: 500 TABLET ORAL at 09:23

## 2023-08-02 RX ADMIN — SODIUM CHLORIDE, POTASSIUM CHLORIDE, SODIUM LACTATE AND CALCIUM CHLORIDE 9 ML/HR: 600; 310; 30; 20 INJECTION, SOLUTION INTRAVENOUS at 09:23

## 2023-08-02 NOTE — DISCHARGE INSTRUCTIONS
DISCHARGE INSTRUCTIONS  ORTHOPEDICS      For your surgery you had:  General anesthesia (you may have a sore throat for the first 24 hours)  You may experience dizziness, drowsiness, or light-headedness for several hours following surgery  Do not stay alone today or tonight.  Limit your activity for 24 hours.  Resume your diet slowly.  Follow whatever special dietary instructions you may have been given by the doctor.  You should not drive or operate machinery or drink alcohol for 24 hours or while you are taking pain medication.  You should not sign any legally binding documents.  If you had an axillary or regional block, you will not have control of the involved limb for up to 12 hours.  Protect the arm with a sling or follow your physician's specific instructions.  DO NOT REMOVE DRESSING OR SPLINT UNTIL FOLLOW-UP APPOINTMENT.  You may shower or bathe: FRIDAY. COVER RIGHT ARM WITH PLASTIC WRAP AND DO NOT GET SURGICAL DRESSING WET.  Sleep with the injured part elevated on a pillow.  Follow verbal instructions of your doctor.  Carry the upper arm in a sling so that the hand and wrist are above the level of the heart.  Exercise fingers for 10 minutes every hour while awake. Ice bag to injured area for 72 hours.  Apply 20 minutes on - 20 minutes off.    In addition to these instructions, follow the discharge instructions on postoperative arthroscopic surgery.  SPECIAL INSTRUCTIONS:           NOTIFY THE PHYSICIAN IF YOU EXPERIENCE:  Numbness of fingers or toes.  Inability to move fingers or toes.  Extreme coldness, paleness or blue dis-coloration of fingers or toes.  Excessive swelling of affected surgical site or swelling that causes the cast to rub or cut into skin.  Pain unrelieved by pain medication  Nausea/vomiting not relieved by prescribed medication  Unable to urinate in 6 hours after surgery  Temperature greater than 101 degree Fahrenheit or chills  If unable to reach your doctor, please go to the closest  emergency room

## 2023-08-02 NOTE — OP NOTE
WRIST OPEN REDUCTION INTERNAL FIXATION  Procedure Report    Patient Name:  Nicole Holliday  YOB: 1940    Date of Surgery:  8/2/2023     Indications:  Wrist injury-->fracture-->operative intervetion    Pre-op Diagnosis:   Closed fracture of distal end of right radius, unspecified fracture morphology, initial encounter [S52.501A]       Post-Op Diagnosis Codes:     * Closed fracture of distal end of right radius, unspecified fracture morphology, initial encounter [S52.501A]    Procedure/CPTr Codes:      Procedure(s):  OPEN REDUCTION INTERNAL FIXATION DISTAL RADIUS RIGHT 4 Part Intra-articular fracture    Staff:  Surgeon(s):  Symone Lawson MD         Anesthesia: General with Block    Estimated Blood Loss: minimal    Implants:    Implant Name Type Inv. Item Serial No.  Lot No. LRB No. Used Action   PUTTY BONE DBX DBM [facility] - [id number] - [id number] Implant PUTTY BONE DBX DBM [facility] [id number] [employer] N/A Right 1 Implanted   PLT RAD DIST VARIAX SMRTLK VOLRANAT STD LNG RT - [id number] Implant PLT RAD DIST VARIAX SMRTLK VOLRANAT STD LNG RT  JOSE RAUL SONA  Right 1 Implanted   SCRW NL BONE FUL/THRD T8 2.7X14MM - [id number] Implant SCRW NL BONE FUL/THRD T8 2.7X14MM  [employer]SE [employer]UL SONA  Right 1 Implanted   SCRW LK BONE FUL/THRD T8 2.7X14MM - [id number] Implant SCRW LK BONE FUL/THRD T8 2.7X14MM  [employer]SE [employer] SONA  Right 3 Implanted   SCRW LK BONE FUL/THRD T8 2.7X18MM - [id number] Implant SCRW LK BONE FUL/THRD T8 2.7X18MM  [employer] SONA  Right 1 Implanted   SCRW LK BONE VARIAX FUL/THRD V8 2.4X14MM - [id number] Implant SCRW LK BONE VARIAX FUL/THRD V8 2.4X14MM  JOSE [employer]UL SONA  Right 1 Implanted   SCRW LK BONE VARIAX FUL/THRD V8 2.4X16MM - [id number] Implant SCRW LK BONE VARIAX FUL/THRD V8 2.4X16MM  JOSE RAUL SONA  Right 1 Implanted   SCRW LK BONE VARIAX FUL/THRD V8 2.4X18MM - [id number] Implant SCRW CATHERINE BONE VARIAX FUL/THRD V8 2.4X18MM  [employer] [facility]  Right 1 Implanted   SCRW  LK BONE VARIAX FUL/THRD V8 2.4X22MM - [id number] Implant SCRW LK BONE VARIAX FUL/THRD V8 2.4X22MM  [employer] SONA  Right 3 Implanted       Specimen:          None        Findings: Distal radius fracture    Complications: None    Description of Procedure: The patient was brought to the operating room and underwent general anesthesia. The patient had a preoperative antibiotic and was prepped and draped in sterile fashion. An Esmarch was used to exsanguinate the limb. The tourniquet was inflated. The incision was made directly over the FCR tendon. This was retracted radially and then the fracture site was then identified. This was then reduced. C-arm fluoroscopy was used throughout the case. The distal radius plate locking distal radius plate was then used. This was done by drilling, depth gauging, and placement of appropriate length screws. The initial screw was cortical followed by the remaining locking screws. This was checked using C-arm fluoroscopy. A good reduction and fixation has been achieved. This was then thoroughly irrigated and closed using 2-0 Vicryl and 3-0 Monocryl. Steri-Strips were applied. A sterile dressing was applied followed by application of a splint. The patient was then extubated and taken to Recovery in stable condition. There were no complications.          Symone Lawson MD     Date: 8/2/2023  Time: 11:54 EDT

## 2023-08-17 ENCOUNTER — OFFICE VISIT (OUTPATIENT)
Dept: ORTHOPEDIC SURGERY | Facility: CLINIC | Age: 83
End: 2023-08-17
Payer: MEDICARE

## 2023-08-17 VITALS — BODY MASS INDEX: 30.82 KG/M2 | HEART RATE: 60 BPM | OXYGEN SATURATION: 97 % | HEIGHT: 60 IN | WEIGHT: 157 LBS

## 2023-08-17 DIAGNOSIS — M25.531 RIGHT WRIST PAIN: Primary | ICD-10-CM

## 2023-08-17 DIAGNOSIS — Z47.89 AFTERCARE FOLLOWING SURGERY OF THE MUSCULOSKELETAL SYSTEM: ICD-10-CM

## 2023-08-17 RX ORDER — FUROSEMIDE 20 MG/1
TABLET ORAL
COMMUNITY
Start: 2023-08-05

## 2023-08-17 NOTE — PATIENT INSTRUCTIONS
X-rays taken and reviewed, showing intact hardware. Sutures removed in office. Patient placed into short arm cast. Advised on cast care. No lifting, pushing, or pulling.     Follow-up in 3 weeks. Remove cast and epeat x-rays needed at this visit.  Please call with questions or concerns.

## 2023-08-17 NOTE — PROGRESS NOTES
"Chief Complaint  Follow-up of the Right Wrist and Suture / Staple Removal    Subjective      Nicole Holliday presents to [facility] for follow-up of ORIF of right distal radius four-part intra-articular fracture performed on 8/2/2023 by Dr. Lawson.  Patient presents today with postop splint and dressing in place.  Reports that pain has been well controlled.  She has primarily been taking Tylenol only as needed.    Objective   Allergies   Allergen Reactions    Contrast Dye (Echo Or Unknown Ct/Mr) Unknown - High Severity     Category: IV Contrast Dyes;       Penicillins Rash    Ibandronic Acid Nausea Only and Unknown - High Severity       Vital Signs:   Pulse 60   Ht 152.4 cm (60\")   Wt 71.2 kg (157 lb)   SpO2 97%   BMI 30.66 kg/mý       Physical Exam    Constitutional: Awake, alert. Well nourished appearance.    Integumentary: Warm, dry, intact. No obvious rashes.    HENT: Atraumatic, normocephalic.   Respiratory: Non labored respirations .   Cardiovascular: Intact peripheral pulses.    Psychiatric: Normal mood and affect. A&O X3    Ortho Exam  Right wrist: Mild edema.  Surgical incision is well-healing.  No evidence of wound dehiscence, surrounding erythema, warmth, or drainage.  Limited wrist ROM in all planes.  Tenderness to palpation of distal radius and ulna.  Patient is able to form a loose fist.  Limited thumb opposition.  Sensation intact light touch.  Distal neurovascular intact.    Imaging Results (Most Recent)       Procedure Component Value Units Date/Time    XR Wrist 2 View Right [[id number]] Resulted: 08/17/23 1302     Updated: 08/17/23 1303    Narrative:      X-Ray Report:  Study: X-rays ordered, taken in the office, and reviewed today.   Site: Right wrist Xray  Indication: ORIF  View: AP/Lateral view(s)  Findings: Intact right distal radius ORIF fixation hardware. No evidence   of hardware malfunction.   Prior studies available for comparison: yes          "     Orthopedic Injury Treatment    Date/Time: 8/17/2023 11:32 AM  Performed by: Karey Grey PA-C  Authorized by: Karey Grey PA-C   Injury location: wrist  Location details: right wrist  Pre-procedure neurovascular assessment: neurovascularly intact    Anesthesia:  Local anesthesia used: no    Sedation:  Patient sedated: no    Immobilization: cast  Splint type: short arm.  Supplies used: cotton padding (fiberglass)  Post-procedure neurovascular assessment: post-procedure neurovascularly intact  Patient tolerance: patient tolerated the procedure well with no immediate complications  Comments: Patient was placed in fiberglass cast today.  The patient tolerated the procedure without any complications. Applied by Mary Jane Guajardo MA.             Assessment and Plan   Problem List Items Addressed This Visit    None  Visit Diagnoses       Right wrist pain    -  Primary    Relevant Orders    XR Wrist 2 View Right (Completed)    S/p R distal radius ORIF                Follow Up   No follow-ups on file.    Tobacco Use: Medium Risk    Smoking Tobacco Use: Former    Smokeless Tobacco Use: Never    Passive Exposure: Not on file     Patient is a former smoker.  Encouraged continued tobacco cessation.  Did not discuss options for smoking cessation.    Patient Instructions   X-rays taken and reviewed, showing intact hardware. Sutures removed in office. Patient placed into short arm cast. Advised on cast care. No lifting, pushing, or pulling.     Follow-up in 3 weeks. Remove cast and epeat x-rays needed at this visit.  Please call with questions or concerns.    Patient was given instructions and counseling regarding her condition or for health maintenance advice. Please see specific information pulled into the AVS if appropriate.

## 2023-09-07 ENCOUNTER — OFFICE VISIT (OUTPATIENT)
Dept: ORTHOPEDIC SURGERY | Facility: CLINIC | Age: 83
End: 2023-09-07
Payer: MEDICARE

## 2023-09-07 VITALS — BODY MASS INDEX: 30.82 KG/M2 | WEIGHT: 157 LBS | HEIGHT: 60 IN

## 2023-09-07 DIAGNOSIS — Z47.89 AFTERCARE FOLLOWING SURGERY OF THE MUSCULOSKELETAL SYSTEM: ICD-10-CM

## 2023-09-07 DIAGNOSIS — M25.531 RIGHT WRIST PAIN: Primary | ICD-10-CM

## 2023-09-07 NOTE — PROGRESS NOTES
"Chief Complaint  Follow-up and Pain of the Right Wrist and Cast Removal    Subjective      Nicole Holliday presents to [facility] for follow-up of ORIF of right distal radius 4 part intra-articular fracture performed on 8/2/2023 by Dr. Lawson.  She presents today with short arm cast in place, reporting pain has been well controlled.  She is eager for cast discontinuation today.    Objective   Allergies   Allergen Reactions    Contrast Dye (Echo Or Unknown Ct/Mr) Unknown - High Severity     Category: IV Contrast Dyes;       Penicillins Rash    Ibandronic Acid Nausea Only and Unknown - High Severity       Vital Signs:   Ht 152.4 cm (60\")   Wt 71.2 kg (157 lb)   BMI 30.66 kg/m²       Physical Exam    Constitutional: Awake, alert. Well nourished appearance.    Integumentary: Warm, dry, intact. No obvious rashes.    HENT: Atraumatic, normocephalic.   Respiratory: Non labored respirations .   Cardiovascular: Intact peripheral pulses.    Psychiatric: Normal mood and affect. A&O X3    Ortho Exam  Right wrist: Mild deformity appreciated.  Well-healing surgical incision visualized.  No evidence of wound dehiscence, surrounding erythema, warmth, or drainage.  There is moderate edema and ecchymosis.  Patient is able to form a loose fist.  Limited thumb opposition.  Sensation intact light touch.  Distal neurovascular intact.  Limited wrist ROM in all planes.    Imaging Results (Most Recent)       Procedure Component Value Units Date/Time    XR Wrist 2 View Right [[id number]] Resulted: 09/07/23 1359     Updated: 09/07/23 1359    Narrative:      X-Ray Report:  Study: X-rays ordered, taken in the office, and reviewed today.   Site: Right wrist Xray  Indication: ORIF  View: AP/Lateral view(s)  Findings: Intact right distal radius ORIF fixation hardware. No evidence   of hardware malfunction.  Well-healing right ulnar styloid fracture.  Prior studies available for comparison: yes                   "   Assessment and Plan   Problem List Items Addressed This Visit    None  Visit Diagnoses       Right wrist pain    -  Primary    Relevant Orders    XR Wrist 2 View Right (Completed)    S/p R distal radius ORIF              Follow Up   Return in about 4 weeks (around 10/5/2023).    Tobacco Use: Medium Risk    Smoking Tobacco Use: Former    Smokeless Tobacco Use: Never    Passive Exposure: Not on file     Patient is a former smoker.  Encouraged continued tobacco cessation.  Did not discuss options for smoking cessation.    Patient Instructions   Cast removed in office. X-rays taken and reviewed. Patient in placed in R wrist brace. No heavy lifting, pushing, or pulling. Home exercises provided. Follow up in 4 weeks. Repeat x-rays needed. Call with changes or concerns.   Patient was given instructions and counseling regarding her condition or for health maintenance advice. Please see specific information pulled into the AVS if appropriate.

## 2023-09-07 NOTE — PATIENT INSTRUCTIONS
Cast removed in office. X-rays taken and reviewed. Patient in placed in R wrist brace. No heavy lifting, pushing, or pulling. Home exercises provided. Follow up in 4 weeks. Repeat x-rays needed. Call with changes or concerns.

## 2023-09-12 ENCOUNTER — OFFICE VISIT (OUTPATIENT)
Dept: FAMILY MEDICINE CLINIC | Facility: CLINIC | Age: 83
End: 2023-09-12
Payer: MEDICARE

## 2023-09-12 VITALS
SYSTOLIC BLOOD PRESSURE: 124 MMHG | WEIGHT: 150 LBS | DIASTOLIC BLOOD PRESSURE: 70 MMHG | HEART RATE: 72 BPM | TEMPERATURE: 96.8 F | OXYGEN SATURATION: 96 % | BODY MASS INDEX: 29.45 KG/M2 | HEIGHT: 60 IN

## 2023-09-12 DIAGNOSIS — M17.11 PRIMARY OSTEOARTHRITIS OF RIGHT KNEE: ICD-10-CM

## 2023-09-12 DIAGNOSIS — I50.9 CONGESTIVE HEART FAILURE, UNSPECIFIED HF CHRONICITY, UNSPECIFIED HEART FAILURE TYPE: ICD-10-CM

## 2023-09-12 DIAGNOSIS — Z09 ENCOUNTER FOR EXAMINATION FOLLOWING TREATMENT AT HOSPITAL: Primary | ICD-10-CM

## 2023-09-12 DIAGNOSIS — E11.22 TYPE 2 DIABETES MELLITUS WITH STAGE 3 CHRONIC KIDNEY DISEASE, WITHOUT LONG-TERM CURRENT USE OF INSULIN, UNSPECIFIED WHETHER STAGE 3A OR 3B CKD: ICD-10-CM

## 2023-09-12 DIAGNOSIS — G62.9 PERIPHERAL NERVE DISEASE: ICD-10-CM

## 2023-09-12 DIAGNOSIS — M13.0 POLYARTHROPATHY: ICD-10-CM

## 2023-09-12 DIAGNOSIS — N18.32 STAGE 3B CHRONIC KIDNEY DISEASE: ICD-10-CM

## 2023-09-12 DIAGNOSIS — I42.9 CARDIOMYOPATHY, UNSPECIFIED TYPE: ICD-10-CM

## 2023-09-12 DIAGNOSIS — N18.30 TYPE 2 DIABETES MELLITUS WITH STAGE 3 CHRONIC KIDNEY DISEASE, WITHOUT LONG-TERM CURRENT USE OF INSULIN, UNSPECIFIED WHETHER STAGE 3A OR 3B CKD: ICD-10-CM

## 2023-09-12 DIAGNOSIS — E87.6 HYPOKALEMIA: ICD-10-CM

## 2023-09-12 LAB
ANION GAP SERPL CALCULATED.3IONS-SCNC: 10 MMOL/L (ref 5–15)
BUN SERPL-MCNC: 31 MG/DL (ref 8–23)
BUN/CREAT SERPL: 20.7 (ref 7–25)
CALCIUM SPEC-SCNC: 10.5 MG/DL (ref 8.6–10.5)
CHLORIDE SERPL-SCNC: 105 MMOL/L (ref 98–107)
CO2 SERPL-SCNC: 29 MMOL/L (ref 22–29)
CREAT SERPL-MCNC: 1.5 MG/DL (ref 0.57–1)
EGFRCR SERPLBLD CKD-EPI 2021: 35.1 ML/MIN/1.73
GLUCOSE SERPL-MCNC: 95 MG/DL (ref 65–99)
NT-PROBNP SERPL-MCNC: 2454 PG/ML (ref 0–1800)
POTASSIUM SERPL-SCNC: 4.4 MMOL/L (ref 3.5–5.2)
SODIUM SERPL-SCNC: 144 MMOL/L (ref 136–145)

## 2023-09-12 PROCEDURE — 83880 ASSAY OF NATRIURETIC PEPTIDE: CPT | Performed by: NURSE PRACTITIONER

## 2023-09-12 PROCEDURE — 80048 BASIC METABOLIC PNL TOTAL CA: CPT | Performed by: NURSE PRACTITIONER

## 2023-09-12 RX ORDER — POTASSIUM CHLORIDE 750 MG/1
10 TABLET, FILM COATED, EXTENDED RELEASE ORAL DAILY
Qty: 90 TABLET | Refills: 0 | Status: SHIPPED | OUTPATIENT
Start: 2023-09-12

## 2023-09-12 RX ORDER — TRAMADOL HYDROCHLORIDE 50 MG/1
50 TABLET ORAL EVERY 8 HOURS PRN
Qty: 270 TABLET | Refills: 0 | Status: SHIPPED | OUTPATIENT
Start: 2023-09-12

## 2023-09-12 RX ORDER — TRAMADOL HYDROCHLORIDE 50 MG/1
50 TABLET ORAL EVERY 8 HOURS PRN
COMMUNITY
End: 2023-09-12 | Stop reason: SDUPTHER

## 2023-09-12 RX ORDER — PREGABALIN 75 MG/1
75 CAPSULE ORAL NIGHTLY
Qty: 90 CAPSULE | Refills: 0 | Status: SHIPPED | OUTPATIENT
Start: 2023-09-12

## 2023-09-12 RX ORDER — FUROSEMIDE 20 MG/1
20 TABLET ORAL DAILY
Qty: 90 TABLET | Refills: 0 | Status: SHIPPED | OUTPATIENT
Start: 2023-09-12

## 2023-09-12 NOTE — PROGRESS NOTES
Chief Complaint  Diabetes (Medication refills)    Subjective            Ada FÁTIMA Holliday presents to [facility]  History of Present Illness  Patient is actually here for F/U from her admission at [facility] and was in the hospital approx 3 days and then pt was given lasix and potassium and they were giving IV lasix--pt reports was having an exacerbation of CHF--fluid build up--and then reports her daughter, Stacey, who is an NP as well is requesting we do the lasix and potassium--continuation--pt thinks was lasix 20 mg and potassium 10 meq--we also will call her daughter to verify these doses as well as review cardiology notes as she just saw him a few days ago and also attempt to obtain [facility] notes    And then also is due for her every 3 month follow-up visit regarding the controlled substances of Lyrica and tramadol and patient is unable to take any nonsteroidal anti-inflammatories--but it does appear that Dr. Lawson actually gave the patient Norco 5/325 and stopped the tramadol--pt reports was Dr Lawson's office that did that--and she never took the Norco--and then once DC to home started taken her tramadol and this works far better and no SE like the Norco--and upon eval and researching her chart appears the NP who works with DR Lawson is the one who stopped it --patient reports that the Norco makes her feel bad and causes too many side effects and she just feels better and safer on her tramadol and she is doing physical therapy at home and they have recommended her using the rollator walker since she was having some balance issues with her orthopedic situation--and she does have chronic kidney disease stage III unable to take any nonsteroidal anti-inflammatories and with the peripheral diabetic neuropathy patient has done very well on the Lyrica with no side effects no issues and then on the tramadol as well no side effects no issues no SI/HI and follows up  every 3 months regularly and her Raz report is appropriate and the yearly urine tox screen was also appropriate and patient shows no aberrant behaviors no signs and symptoms of misuse nor abuse and these medications do improve her overall quality of life and she is able to do her own ADLs    And of note patient also recently had a fall and broke her wrist and had to have surgery and this is where the Norco was introduced and then patient reports she had to get cardiology clearance in order for them to even do the block and some sedation for the surgery and she has recovered well and wears a splint and uses her rollator walker and then is having PT at home    Patient should not be due for any of her medication refills or her fasting labs her Medicare wellness until December 9, 2023--as she just did all of these 3 months ago and [facility] refilled her for 6 months on all of the regular medications and she just received her 3-month supply and we will spot check the labs because of the hypokalemia and congestive heart failure that she went into    PHQ-2 Total Score: 0  PHQ-9 Total Score: 0    Past Medical History:   Diagnosis Date    Arthritis     Benign essential hypertension     Cancer     cervical    Cardiomyopathy     CKD (chronic kidney disease)     Coronary artery disease     Diabetes mellitus     GERD (gastroesophageal reflux disease)     Gout     HLD (hyperlipidemia)     Hypothyroidism        Allergies   Allergen Reactions    Contrast Dye (Echo Or Unknown Ct/Mr) Unknown - High Severity     Category: IV Contrast Dyes;       Penicillins Rash    Ibandronic Acid Nausea Only and Unknown - High Severity        Past Surgical History:   Procedure Laterality Date    CHOLECYSTECTOMY      CORONARY ANGIOPLASTY WITH STENT PLACEMENT      GASTRIC BYPASS      HYSTERECTOMY      ORIF WRIST FRACTURE Right 8/2/2023    Procedure: OPEN REDUCTION INTERNAL FIXATION DISTAL RADIUS;  Surgeon: Symone Lawson MD;  [facility];  Service: Orthopedics;  Laterality: Right;    VENTRICULAR CARDIAC PACEMAKER INSERTION      WRIST FRACTURE SURGERY          Social History     Tobacco Use    Smoking status: Former     Packs/day: 0.50     Years: 5.00     Pack years: 2.50     Types: Cigarettes     Quit date:      Years since quittin.7     Passive exposure: Past    Smokeless tobacco: Never   Vaping Use    Vaping Use: Never used   Substance Use Topics    Alcohol use: Never    Drug use: Never       Family History   Problem Relation Age of Onset    Arthritis Father     Colon cancer Father     Colon cancer Brother         Health Maintenance Due   Topic Date Due    COVID-19 Vaccine (5 - Moderna series) 2023    DIABETIC EYE EXAM  2023        Current Outpatient Medications on File Prior to Visit   Medication Sig    allopurinol (ZYLOPRIM) 100 MG tablet Take 1 tablet by mouth Daily.    aspirin 81 MG EC tablet Take 1 tablet by mouth Daily. Last dose 23 per pt as directed.    atorvastatin (LIPITOR) 40 MG tablet Take 1 tablet by mouth Daily.    Blood Glucose Monitoring Suppl (FreeStyle Lite) device 1 each Daily.    JOHNSON PO Take  by mouth. Johnson gels at HS for the gout pain OTC    clopidogrel (PLAVIX) 75 MG tablet Take 1 tablet by mouth Daily.    dapagliflozin Propanediol 10 MG tablet Take 10 mg by mouth Daily. Per Dr Luis    Diclofenac Sodium (VOLTAREN) 1 % gel gel Apply 4 g topically to the appropriate area as directed 4 (Four) Times a Day As Needed (OA joint pain).    glucose blood test strip 1 each by Other route Daily. Use as instructed    Lancets (freestyle) lancets 1 each by Other route Daily. Use as instructed    levothyroxine (SYNTHROID, LEVOTHROID) 75 MCG tablet Take 1 tablet by mouth Every Morning.    metoprolol succinate XL (TOPROL-XL) 50 MG 24 hr tablet Take 1 tablet by mouth 2 (Two) Times a Day.    nitroglycerin (NITROSTAT) 0.4 MG SL tablet Take 1 tablet by mouth Every 5 (Five) Minutes As Needed for Chest Pain.     pantoprazole (PROTONIX) 40 MG EC tablet Take 1 tablet by mouth 2 (Two) Times a Day.    sacubitril-valsartan (ENTRESTO) 24-26 MG tablet Take 1 tablet by mouth 2 (Two) Times a Day.    vitamin D3 125 MCG (5000 UT) capsule capsule Take 2,000 Units by mouth Daily.    [DISCONTINUED] furosemide (LASIX) 20 MG tablet     [DISCONTINUED] pregabalin (Lyrica) 75 MG capsule Take 1 capsule by mouth Every Night.    [DISCONTINUED] traMADol (ULTRAM) 50 MG tablet Take 1 tablet by mouth Every 8 (Eight) Hours As Needed for Moderate Pain or Severe Pain.    [DISCONTINUED] HYDROcodone-acetaminophen (NORCO) 7.5-325 MG per tablet Take 1 tablet by mouth Every 4 (Four) Hours As Needed for Moderate Pain (Pain).     No current facility-administered medications on file prior to visit.       Immunization History   Administered Date(s) Administered    COVID-19 (MODERNA) 1st,2nd,3rd Dose Monovalent 01/21/2021, 02/23/2021, 11/03/2021    COVID-19 (MODERNA) BIVALENT 12+YRS 10/06/2022    FLUAD TRI 65YR+ 10/26/2017    Fluzone High Dose =>65 Years ([employer] ONLY) 10/27/2014, 10/28/2015, 10/26/2016    Fluzone High-Dose 65+yrs 09/27/2021, 09/28/2022    Hepatitis A 10/10/2018, 09/08/2020    Influenza, Unspecified 10/10/2018, 09/30/2019    Pneumococcal Conjugate 20-Valent (PCV20) 12/08/2022       Review of Systems   Constitutional:  Negative for unexpected weight gain and unexpected weight loss.   HENT:  Negative for trouble swallowing.    Eyes:  Negative for blurred vision and double vision.   Respiratory:  Negative for choking and shortness of breath.    Cardiovascular:  Negative for chest pain and leg swelling.   Gastrointestinal:  Negative for abdominal pain.   Genitourinary:  Negative for dysuria.   Musculoskeletal:  Positive for arthralgias and gait problem.   Neurological:  Negative for dizziness, seizures, syncope and light-headedness.   Psychiatric/Behavioral: Negative.        Objective     /70 (BP Location: Right arm, Patient Position: Sitting,  "Cuff Size: Adult)   Pulse 72   Temp 96.8 °F (36 °C) (Temporal)   Ht 152.4 cm (60\")   Wt 68 kg (150 lb)   SpO2 96%   BMI 29.29 kg/m²       Physical Exam  Vitals and nursing note reviewed.   Constitutional:       Appearance: Normal appearance.      Comments: BMI 29.29    HENT:      Head: Normocephalic.      Right Ear: External ear normal.      Left Ear: External ear normal.      Nose: Nose normal.      Mouth/Throat:      Mouth: Mucous membranes are moist.   Eyes:      Pupils: Pupils are equal, round, and reactive to light.   Cardiovascular:      Rate and Rhythm: Normal rate and regular rhythm.      Heart sounds: Normal heart sounds.   Pulmonary:      Effort: Pulmonary effort is normal.      Breath sounds: Normal breath sounds.   Abdominal:      Palpations: Abdomen is soft.   Musculoskeletal:      Cervical back: Normal range of motion.      Right lower leg: No edema.      Left lower leg: No edema.      Comments: Using a rollator walker--and then right wrist splint/brace--and then pt just had surgery    Skin:     General: Skin is warm and dry.   Neurological:      Mental Status: She is alert and oriented to person, place, and time.   Psychiatric:         Mood and Affect: Mood normal.         Behavior: Behavior normal.         Thought Content: Thought content normal.         Judgment: Judgment normal.       Result Review :                      FROM CARDIOLOGY VISIT 9/6/23:  Assessment/Plan    Cardiac status appears to be stable at this time. Maintain Lasix will refill.    BMP follow-up per family doctor.    Verify Pacemaker transmission most recent documented May 2023.  Recommend extra Lasix as needed increased weight gain shortness of breath    Follow-up 4 months    Diagnoses/Orders:  Diagnoses and all orders for this visit:  Ischemic cardiomyopathy  Hypercholesterolemia, not otherwise specified  Automatic implantable cardiac defibrillator in situ  Essential hypertension  Renal insufficiency    Elian Luis, " MD [facility]  9/6/2023 9:01 AM  Electronically signed by Elian Luis MD at 09/06/2023 9:27 AM EDT      Assessment and Plan      Diagnoses and all orders for this visit:    1. Encounter for examination following treatment at hospital (Primary)  -     Basic Metabolic Panel  -     proBNP    2. Congestive heart failure, unspecified HF chronicity, unspecified heart failure type  -     furosemide (LASIX) 20 MG tablet; Take 1 tablet by mouth Daily.  Dispense: 90 tablet; Refill: 0  -     potassium chloride 10 MEQ CR tablet; Take 1 tablet by mouth Daily.  Dispense: 90 tablet; Refill: 0  -     Basic Metabolic Panel  -     proBNP    3. Hypokalemia  -     Basic Metabolic Panel  -     proBNP    4. Cardiomyopathy, unspecified type  -     furosemide (LASIX) 20 MG tablet; Take 1 tablet by mouth Daily.  Dispense: 90 tablet; Refill: 0  -     potassium chloride 10 MEQ CR tablet; Take 1 tablet by mouth Daily.  Dispense: 90 tablet; Refill: 0  -     Basic Metabolic Panel  -     proBNP    5. Type 2 diabetes mellitus with stage 3 chronic kidney disease, without long-term current use of insulin, unspecified whether stage 3a or 3b CKD  -     pregabalin (Lyrica) 75 MG capsule; Take 1 capsule by mouth Every Night.  Dispense: 90 capsule; Refill: 0    6. Peripheral nerve disease  -     pregabalin (Lyrica) 75 MG capsule; Take 1 capsule by mouth Every Night.  Dispense: 90 capsule; Refill: 0  -     traMADol (ULTRAM) 50 MG tablet; Take 1 tablet by mouth Every 8 (Eight) Hours As Needed for Moderate Pain or Severe Pain.  Dispense: 270 tablet; Refill: 0    7. Stage 3b chronic kidney disease  -     pregabalin (Lyrica) 75 MG capsule; Take 1 capsule by mouth Every Night.  Dispense: 90 capsule; Refill: 0  -     furosemide (LASIX) 20 MG tablet; Take 1 tablet by mouth Daily.  Dispense: 90 tablet; Refill: 0  -     potassium chloride 10 MEQ CR tablet; Take 1 tablet by mouth Daily.  Dispense: 90 tablet; Refill: 0  -     Basic Metabolic Panel  -      proBNP    8. Polyarthropathy  -     traMADol (ULTRAM) 50 MG tablet; Take 1 tablet by mouth Every 8 (Eight) Hours As Needed for Moderate Pain or Severe Pain.  Dispense: 270 tablet; Refill: 0    9. Primary osteoarthritis of right knee  -     traMADol (ULTRAM) 50 MG tablet; Take 1 tablet by mouth Every 8 (Eight) Hours As Needed for Moderate Pain or Severe Pain.  Dispense: 270 tablet; Refill: 0    I did review cardiology notes from the recent visit and I am still awaiting all of the records from [facility] and we will review all of these in detail with the labs and x-rays and all of the admission and discharge notes and then I also had the patient to call her daughter and she verified that it was Lasix 20 and potassium 10 mEq that needed to be continued    At 9:34 AM we still have not received the records that we called for so I will do an amendment once I have received the records        Follow Up     Return in about 3 months (around 12/9/2023), or if symptoms worsen or fail to improve, for Recheck, fasting labs and refills, Medicare Wellness.    Patient was given instructions and counseling regarding her condition or for health maintenance advice. Please see specific information pulled into the AVS if appropriate.     BMI is >= 25 and <30. (Overweight) The following options were offered after discussion;: exercise counseling/recommendations and nutrition counseling/recommendations      Nicole FÁTIMA Holliday  reports that she quit smoking about 21 years ago. Her smoking use included cigarettes. She has a 2.50 pack-year smoking history. She has been exposed to tobacco smoke. She has never used smokeless tobacco.         Answers submitted by the patient for this visit:  Other (Submitted on 9/11/2023)  Please describe your symptoms.: Routine follow up, fasting labs and refills  Have you had these symptoms before?: Yes  How long have you been having these symptoms?: Greater than 2 weeks  Please list any medications  you are currently taking for this condition.: Na  Please describe any probable cause for these symptoms. : Na  Primary Reason for Visit (Submitted on 9/11/2023)  What is the primary reason for your visit?: Other

## 2023-09-13 NOTE — PROGRESS NOTES
Please mail letter to patient stating    Effie, the basic metabolic panel shows normal glucose and all of the electrolytes were in good range the potassium was 4.4 and the kidney function tests are still in stable chronic kidney disease stage III    And the BNP that is your congestive heart failure marker shows that you were at 11,004 1 month ago and now you are down to 2454 so this is a vast improvement

## 2023-10-05 ENCOUNTER — OFFICE VISIT (OUTPATIENT)
Dept: ORTHOPEDIC SURGERY | Facility: CLINIC | Age: 83
End: 2023-10-05
Payer: MEDICARE

## 2023-10-05 VITALS
DIASTOLIC BLOOD PRESSURE: 57 MMHG | OXYGEN SATURATION: 95 % | HEIGHT: 60 IN | SYSTOLIC BLOOD PRESSURE: 90 MMHG | BODY MASS INDEX: 27.68 KG/M2 | WEIGHT: 141 LBS | HEART RATE: 62 BPM

## 2023-10-05 DIAGNOSIS — Z47.89 AFTERCARE FOLLOWING SURGERY OF THE MUSCULOSKELETAL SYSTEM: Primary | ICD-10-CM

## 2023-10-05 PROCEDURE — 99024 POSTOP FOLLOW-UP VISIT: CPT | Performed by: PHYSICIAN ASSISTANT

## 2023-10-05 PROCEDURE — 3074F SYST BP LT 130 MM HG: CPT | Performed by: PHYSICIAN ASSISTANT

## 2023-10-05 PROCEDURE — 1160F RVW MEDS BY RX/DR IN RCRD: CPT | Performed by: PHYSICIAN ASSISTANT

## 2023-10-05 PROCEDURE — 1159F MED LIST DOCD IN RCRD: CPT | Performed by: PHYSICIAN ASSISTANT

## 2023-10-05 PROCEDURE — 3078F DIAST BP <80 MM HG: CPT | Performed by: PHYSICIAN ASSISTANT

## 2023-10-05 NOTE — PROGRESS NOTES
"Chief Complaint  Follow-up and Pain of the Right Wrist    Subjective      Ada FÁTIMA Holliday presents to [facility] for follow-up of ORIF of right distal radius four-part intra-articular fracture performed on 8/2/2023 by Dr. Lawson.  Patient presents today for follow-up with right wrist brace in place.  She states pain persists, primarily at the distal ulna, which she relates to brace \"rubbing\".  She feels she has regained near full right wrist ROM and has been compliant with participation in home exercise program.    Objective   Allergies   Allergen Reactions    Contrast Dye (Echo Or Unknown Ct/Mr) Unknown - High Severity     Category: IV Contrast Dyes;       Penicillins Rash    Ibandronic Acid Nausea Only and Unknown - High Severity       Vital Signs:   BP 90/57   Pulse 62   Ht 152.4 cm (60\")   Wt 64 kg (141 lb)   SpO2 95%   BMI 27.54 kg/mý       Physical Exam    Constitutional: Awake, alert. Well nourished appearance.    Integumentary: Warm, dry, intact. No obvious rashes.    HENT: Atraumatic, normocephalic.   Respiratory: Non labored respirations .   Cardiovascular: Intact peripheral pulses.    Psychiatric: Normal mood and affect. A&O X3    Ortho Exam  Right wrist: Skin is warm, dry, and intact.  Well-healed surgical scar noted.  Mild deformity noted to distal ulna.  Decreased right wrist ROM in all planes, although is symmetrical with left wrist ROM.  Patient is able to form a full fist.  Good thumb opposition.  Sensation intact light touch.  Distal neurovascular intact.    Imaging Results (Most Recent)       Procedure Component Value Units Date/Time    XR Wrist 2 View Right [[id number]] Resulted: 10/09/23 0810     Updated: 10/09/23 0811    Narrative:      X-Ray Report:  Study: X-rays ordered, taken in the office, and reviewed today.   Site: Right wrist Xray  Indication: ORIF  View: AP/Lateral view(s)  Findings: Intact right distal radius ORIF fixation hardware. No evidence   of " hardware malfunction.  Continued interval improvement noted to right   ulnar styloid fracture.  Stable alignment.  Prior studies available for comparison: yes                       Assessment and Plan   Problem List Items Addressed This Visit    None  Visit Diagnoses       Aftercare following surgery of the musculoskeletal system    -  Primary    Relevant Orders    XR Wrist 2 View Right (Completed)            Follow Up   Return if symptoms worsen or fail to improve.    Tobacco Use: Medium Risk (10/5/2023)    Patient History     Smoking Tobacco Use: Former     Smokeless Tobacco Use: Never     Passive Exposure: Past     Patient is a former smoker.  Encouraged continued tobacco cessation.  Did not discuss options for smoking cessation.    Patient Instructions   X-rays taken and reviewed. Advised continued participation in home exercises. May gradually return to activity as tolerated. Continue icing and elevation as needed.     Follow up as needed. Call with changes or concerns.   Patient was given instructions and counseling regarding her condition or for health maintenance advice. Please see specific information pulled into the AVS if appropriate.

## 2023-10-05 NOTE — PATIENT INSTRUCTIONS
X-rays taken and reviewed. Advised continued participation in home exercises. May gradually return to activity as tolerated. Continue icing and elevation as needed.     Follow up as needed. Call with changes or concerns.

## 2023-12-12 ENCOUNTER — OFFICE VISIT (OUTPATIENT)
Dept: FAMILY MEDICINE CLINIC | Facility: CLINIC | Age: 83
End: 2023-12-12
Payer: MEDICARE

## 2023-12-12 VITALS
HEART RATE: 64 BPM | HEIGHT: 60 IN | OXYGEN SATURATION: 97 % | DIASTOLIC BLOOD PRESSURE: 62 MMHG | SYSTOLIC BLOOD PRESSURE: 116 MMHG | WEIGHT: 156 LBS | TEMPERATURE: 96.7 F | BODY MASS INDEX: 30.63 KG/M2

## 2023-12-12 DIAGNOSIS — M17.11 PRIMARY OSTEOARTHRITIS OF RIGHT KNEE: ICD-10-CM

## 2023-12-12 DIAGNOSIS — M13.0 POLYARTHROPATHY: ICD-10-CM

## 2023-12-12 DIAGNOSIS — Z79.899 HIGH RISK MEDICATION USE: ICD-10-CM

## 2023-12-12 DIAGNOSIS — N18.30 TYPE 2 DIABETES MELLITUS WITH STAGE 3 CHRONIC KIDNEY DISEASE, WITHOUT LONG-TERM CURRENT USE OF INSULIN, UNSPECIFIED WHETHER STAGE 3A OR 3B CKD: ICD-10-CM

## 2023-12-12 DIAGNOSIS — G62.9 PERIPHERAL NERVE DISEASE: ICD-10-CM

## 2023-12-12 DIAGNOSIS — I50.9 CONGESTIVE HEART FAILURE, UNSPECIFIED HF CHRONICITY, UNSPECIFIED HEART FAILURE TYPE: ICD-10-CM

## 2023-12-12 DIAGNOSIS — E78.2 MIXED HYPERLIPIDEMIA: ICD-10-CM

## 2023-12-12 DIAGNOSIS — I42.9 CARDIOMYOPATHY, UNSPECIFIED TYPE: ICD-10-CM

## 2023-12-12 DIAGNOSIS — E03.9 HYPOTHYROIDISM, UNSPECIFIED TYPE: ICD-10-CM

## 2023-12-12 DIAGNOSIS — I10 BENIGN ESSENTIAL HYPERTENSION: ICD-10-CM

## 2023-12-12 DIAGNOSIS — N18.32 STAGE 3B CHRONIC KIDNEY DISEASE: ICD-10-CM

## 2023-12-12 DIAGNOSIS — Z00.00 MEDICARE ANNUAL WELLNESS VISIT, SUBSEQUENT: Primary | ICD-10-CM

## 2023-12-12 DIAGNOSIS — K21.9 GASTROESOPHAGEAL REFLUX DISEASE WITHOUT ESOPHAGITIS: ICD-10-CM

## 2023-12-12 DIAGNOSIS — M1A.9XX0 CHRONIC GOUT WITHOUT TOPHUS, UNSPECIFIED CAUSE, UNSPECIFIED SITE: ICD-10-CM

## 2023-12-12 DIAGNOSIS — E11.22 TYPE 2 DIABETES MELLITUS WITH STAGE 3 CHRONIC KIDNEY DISEASE, WITHOUT LONG-TERM CURRENT USE OF INSULIN, UNSPECIFIED WHETHER STAGE 3A OR 3B CKD: ICD-10-CM

## 2023-12-12 LAB
ALBUMIN SERPL-MCNC: 4.1 G/DL (ref 3.5–5.2)
ALBUMIN/GLOB SERPL: 1.2 G/DL
ALP SERPL-CCNC: 109 U/L (ref 39–117)
ALT SERPL W P-5'-P-CCNC: 16 U/L (ref 1–33)
AMPHET+METHAMPHET UR QL: NEGATIVE
ANION GAP SERPL CALCULATED.3IONS-SCNC: 11.9 MMOL/L (ref 5–15)
AST SERPL-CCNC: 27 U/L (ref 1–32)
BACTERIA UR QL AUTO: ABNORMAL /HPF
BARBITURATES UR QL SCN: NEGATIVE
BASOPHILS # BLD AUTO: 0.04 10*3/MM3 (ref 0–0.2)
BASOPHILS NFR BLD AUTO: 0.5 % (ref 0–1.5)
BENZODIAZ UR QL SCN: NEGATIVE
BILIRUB SERPL-MCNC: 0.5 MG/DL (ref 0–1.2)
BILIRUB UR QL STRIP: NEGATIVE
BUN SERPL-MCNC: 24 MG/DL (ref 8–23)
BUN/CREAT SERPL: 17.6 (ref 7–25)
CALCIUM SPEC-SCNC: 10.7 MG/DL (ref 8.6–10.5)
CANNABINOIDS SERPL QL: NEGATIVE
CHLORIDE SERPL-SCNC: 107 MMOL/L (ref 98–107)
CHOLEST SERPL-MCNC: 142 MG/DL (ref 0–200)
CLARITY UR: CLEAR
CO2 SERPL-SCNC: 25.1 MMOL/L (ref 22–29)
COCAINE UR QL: NEGATIVE
COLOR UR: ABNORMAL
CREAT SERPL-MCNC: 1.36 MG/DL (ref 0.57–1)
DEPRECATED RDW RBC AUTO: 47.7 FL (ref 37–54)
EGFRCR SERPLBLD CKD-EPI 2021: 39.2 ML/MIN/1.73
EOSINOPHIL # BLD AUTO: 0.35 10*3/MM3 (ref 0–0.4)
EOSINOPHIL NFR BLD AUTO: 4.7 % (ref 0.3–6.2)
ERYTHROCYTE [DISTWIDTH] IN BLOOD BY AUTOMATED COUNT: 13.2 % (ref 12.3–15.4)
FENTANYL UR-MCNC: NEGATIVE NG/ML
GLOBULIN UR ELPH-MCNC: 3.3 GM/DL
GLUCOSE SERPL-MCNC: 93 MG/DL (ref 65–99)
GLUCOSE UR STRIP-MCNC: ABNORMAL MG/DL
HBA1C MFR BLD: 5.7 % (ref 4.8–5.6)
HCT VFR BLD AUTO: 41.9 % (ref 34–46.6)
HDLC SERPL-MCNC: 50 MG/DL (ref 40–60)
HGB BLD-MCNC: 13.9 G/DL (ref 12–15.9)
HGB UR QL STRIP.AUTO: NEGATIVE
HOLD SPECIMEN: NORMAL
HYALINE CASTS UR QL AUTO: ABNORMAL /LPF
IMM GRANULOCYTES # BLD AUTO: 0.02 10*3/MM3 (ref 0–0.05)
IMM GRANULOCYTES NFR BLD AUTO: 0.3 % (ref 0–0.5)
KETONES UR QL STRIP: NEGATIVE
LDLC SERPL CALC-MCNC: 71 MG/DL (ref 0–100)
LDLC/HDLC SERPL: 1.38 {RATIO}
LEUKOCYTE ESTERASE UR QL STRIP.AUTO: ABNORMAL
LYMPHOCYTES # BLD AUTO: 2 10*3/MM3 (ref 0.7–3.1)
LYMPHOCYTES NFR BLD AUTO: 26.9 % (ref 19.6–45.3)
MCH RBC QN AUTO: 32.7 PG (ref 26.6–33)
MCHC RBC AUTO-ENTMCNC: 33.2 G/DL (ref 31.5–35.7)
MCV RBC AUTO: 98.6 FL (ref 79–97)
METHADONE UR QL SCN: NEGATIVE
MONOCYTES # BLD AUTO: 0.68 10*3/MM3 (ref 0.1–0.9)
MONOCYTES NFR BLD AUTO: 9.2 % (ref 5–12)
NEUTROPHILS NFR BLD AUTO: 4.34 10*3/MM3 (ref 1.7–7)
NEUTROPHILS NFR BLD AUTO: 58.4 % (ref 42.7–76)
NITRITE UR QL STRIP: NEGATIVE
NRBC BLD AUTO-RTO: 0 /100 WBC (ref 0–0.2)
OPIATES UR QL: NEGATIVE
OXYCODONE UR QL SCN: NEGATIVE
PH UR STRIP.AUTO: 5.5 [PH] (ref 5–8)
PLATELET # BLD AUTO: 222 10*3/MM3 (ref 140–450)
PMV BLD AUTO: 12.2 FL (ref 6–12)
POTASSIUM SERPL-SCNC: 4.1 MMOL/L (ref 3.5–5.2)
PROT SERPL-MCNC: 7.4 G/DL (ref 6–8.5)
PROT UR QL STRIP: ABNORMAL
RBC # BLD AUTO: 4.25 10*6/MM3 (ref 3.77–5.28)
RBC # UR STRIP: ABNORMAL /HPF
REF LAB TEST METHOD: ABNORMAL
SODIUM SERPL-SCNC: 144 MMOL/L (ref 136–145)
SP GR UR STRIP: >=1.03 (ref 1–1.03)
SQUAMOUS #/AREA URNS HPF: ABNORMAL /HPF
TRIGL SERPL-MCNC: 116 MG/DL (ref 0–150)
TSH SERPL DL<=0.05 MIU/L-ACNC: 1.95 UIU/ML (ref 0.27–4.2)
URATE SERPL-MCNC: 4.3 MG/DL (ref 2.4–5.7)
UROBILINOGEN UR QL STRIP: ABNORMAL
VLDLC SERPL-MCNC: 21 MG/DL (ref 5–40)
WBC # UR STRIP: ABNORMAL /HPF
WBC NRBC COR # BLD AUTO: 7.43 10*3/MM3 (ref 3.4–10.8)

## 2023-12-12 PROCEDURE — 80307 DRUG TEST PRSMV CHEM ANLYZR: CPT | Performed by: NURSE PRACTITIONER

## 2023-12-12 PROCEDURE — 81001 URINALYSIS AUTO W/SCOPE: CPT | Performed by: NURSE PRACTITIONER

## 2023-12-12 PROCEDURE — 83036 HEMOGLOBIN GLYCOSYLATED A1C: CPT | Performed by: NURSE PRACTITIONER

## 2023-12-12 PROCEDURE — 80061 LIPID PANEL: CPT | Performed by: NURSE PRACTITIONER

## 2023-12-12 PROCEDURE — 84443 ASSAY THYROID STIM HORMONE: CPT | Performed by: NURSE PRACTITIONER

## 2023-12-12 PROCEDURE — 87086 URINE CULTURE/COLONY COUNT: CPT | Performed by: NURSE PRACTITIONER

## 2023-12-12 PROCEDURE — 80053 COMPREHEN METABOLIC PANEL: CPT | Performed by: NURSE PRACTITIONER

## 2023-12-12 PROCEDURE — 84550 ASSAY OF BLOOD/URIC ACID: CPT | Performed by: NURSE PRACTITIONER

## 2023-12-12 PROCEDURE — 85025 COMPLETE CBC W/AUTO DIFF WBC: CPT | Performed by: NURSE PRACTITIONER

## 2023-12-12 RX ORDER — PANTOPRAZOLE SODIUM 40 MG/1
40 TABLET, DELAYED RELEASE ORAL 2 TIMES DAILY
Qty: 180 TABLET | Refills: 1 | Status: SHIPPED | OUTPATIENT
Start: 2023-12-12

## 2023-12-12 RX ORDER — FUROSEMIDE 20 MG/1
20 TABLET ORAL DAILY
Qty: 90 TABLET | Refills: 1 | Status: SHIPPED | OUTPATIENT
Start: 2023-12-12

## 2023-12-12 RX ORDER — ATORVASTATIN CALCIUM 40 MG/1
40 TABLET, FILM COATED ORAL DAILY
Qty: 90 TABLET | Refills: 1 | Status: SHIPPED | OUTPATIENT
Start: 2023-12-12

## 2023-12-12 RX ORDER — CLOPIDOGREL BISULFATE 75 MG/1
75 TABLET ORAL DAILY
Qty: 90 TABLET | Refills: 1 | Status: SHIPPED | OUTPATIENT
Start: 2023-12-12

## 2023-12-12 RX ORDER — LEVOTHYROXINE SODIUM 0.07 MG/1
75 TABLET ORAL
Qty: 90 TABLET | Refills: 1 | Status: SHIPPED | OUTPATIENT
Start: 2023-12-12

## 2023-12-12 RX ORDER — METOPROLOL SUCCINATE 50 MG/1
50 TABLET, EXTENDED RELEASE ORAL 2 TIMES DAILY
Qty: 180 TABLET | Refills: 1 | Status: SHIPPED | OUTPATIENT
Start: 2023-12-12

## 2023-12-12 RX ORDER — TRAMADOL HYDROCHLORIDE 50 MG/1
50 TABLET ORAL EVERY 8 HOURS PRN
Qty: 270 TABLET | Refills: 0 | Status: SHIPPED | OUTPATIENT
Start: 2023-12-12

## 2023-12-12 RX ORDER — POTASSIUM CHLORIDE 750 MG/1
10 TABLET, FILM COATED, EXTENDED RELEASE ORAL DAILY
Qty: 90 TABLET | Refills: 1 | Status: SHIPPED | OUTPATIENT
Start: 2023-12-12

## 2023-12-12 RX ORDER — ALLOPURINOL 100 MG/1
100 TABLET ORAL DAILY
Qty: 90 TABLET | Refills: 1 | Status: SHIPPED | OUTPATIENT
Start: 2023-12-12

## 2023-12-12 RX ORDER — NITROGLYCERIN 0.4 MG/1
0.4 TABLET SUBLINGUAL
Qty: 25 TABLET | Refills: 1 | Status: SHIPPED | OUTPATIENT
Start: 2023-12-12

## 2023-12-12 RX ORDER — PREGABALIN 75 MG/1
75 CAPSULE ORAL NIGHTLY
Qty: 90 CAPSULE | Refills: 0 | Status: SHIPPED | OUTPATIENT
Start: 2023-12-12

## 2023-12-12 NOTE — PROGRESS NOTES
The ABCs of the Annual Wellness Visit  Subsequent Medicare Wellness Visit    Subjective    Nicole Holliday is a 83 y.o. female who presents for a Subsequent Medicare Wellness Visit.    The following portions of the patient's history were reviewed and   updated as appropriate: allergies, current medications, past family history, past medical history, past social history, past surgical history, and problem list.    Compared to one year ago, the patient feels her physical   health is better.    Compared to one year ago, the patient feels her mental   health is the same.    Recent Hospitalizations:  She was admitted within the past 365 days at [facility].       Current Medical Providers:  Patient Care Team:  Brenda Son APRN as PCP - General (Nurse Practitioner)    Outpatient Medications Prior to Visit   Medication Sig Dispense Refill    aspirin 81 MG EC tablet Take 1 tablet by mouth Daily. Last dose 07/16/23 per pt as directed.      Blood Glucose Monitoring Suppl (FreeStyle Lite) device 1 each Daily. 1 each 0    CHERRY PO Take  by mouth. Johnson gels at HS for the gout pain OTC      dapagliflozin Propanediol 10 MG tablet Take 10 mg by mouth Daily. Per Dr Luis      glucose blood test strip 1 each by Other route Daily. Use as instructed 100 each 4    Lancets (freestyle) lancets 1 each by Other route Daily. Use as instructed 100 each 3    sacubitril-valsartan (ENTRESTO) 24-26 MG tablet Take 1 tablet by mouth 2 (Two) Times a Day.      vitamin D3 125 MCG (5000 UT) capsule capsule Take 2,000 Units by mouth Daily.      allopurinol (ZYLOPRIM) 100 MG tablet Take 1 tablet by mouth Daily. 90 tablet 1    atorvastatin (LIPITOR) 40 MG tablet Take 1 tablet by mouth Daily. 90 tablet 1    clopidogrel (PLAVIX) 75 MG tablet Take 1 tablet by mouth Daily. 90 tablet 1    Diclofenac Sodium (VOLTAREN) 1 % gel gel Apply 4 g topically to the appropriate area as directed 4 (Four) Times a Day As Needed (OA joint pain). 350 g  1    furosemide (LASIX) 20 MG tablet Take 1 tablet by mouth Daily. 90 tablet 0    levothyroxine (SYNTHROID, LEVOTHROID) 75 MCG tablet Take 1 tablet by mouth Every Morning. 90 tablet 1    metoprolol succinate XL (TOPROL-XL) 50 MG 24 hr tablet Take 1 tablet by mouth 2 (Two) Times a Day. 180 tablet 1    nitroglycerin (NITROSTAT) 0.4 MG SL tablet Take 1 tablet by mouth Every 5 (Five) Minutes As Needed for Chest Pain. 25 tablet 1    pantoprazole (PROTONIX) 40 MG EC tablet Take 1 tablet by mouth 2 (Two) Times a Day. 180 tablet 1    potassium chloride 10 MEQ CR tablet Take 1 tablet by mouth Daily. 90 tablet 0    pregabalin (Lyrica) 75 MG capsule Take 1 capsule by mouth Every Night. 90 capsule 0    traMADol (ULTRAM) 50 MG tablet Take 1 tablet by mouth Every 8 (Eight) Hours As Needed for Moderate Pain or Severe Pain. 270 tablet 0     No facility-administered medications prior to visit.       Opioid medication/s are on active medication list.  and I have evaluated her active treatment plan and pain score trends (see table).  There were no vitals filed for this visit.  I have reviewed the chart for potential of high risk medication and harmful drug interactions in the elderly.          Aspirin is on active medication list. Aspirin use is indicated based on review of current medical condition/s. Pros and cons of this therapy have been discussed today. Benefits of this medication outweigh potential harm.  Patient has been encouraged to continue taking this medication.  .      Patient Active Problem List   Diagnosis    Stage 3 chronic kidney disease    Polyarthropathy    Peripheral nerve disease    Hypothyroidism    Hyperlipidemia    Gastroesophageal reflux disease    Diabetes mellitus, type II    Cardiomyopathy    Benign essential hypertension    Automatic implantable cardiac defibrillator in situ    Arteriosclerosis of coronary artery    Allergic rhinitis    Gout    Atherosclerosis    Pain of knee joint with osteochondral  "injury    Primary osteoarthritis of right knee    Closed fracture of right distal radius    Distal radius fracture, right     Advance Care Planning   Advance Care Planning     Advance Directive is on file.  ACP discussion was held with the patient during this visit. Patient has an advance directive in EMR which is still valid.      Objective    Vitals:    23 0758   BP: 116/62   BP Location: Left arm   Patient Position: Sitting   Cuff Size: Adult   Pulse: 64   Temp: 96.7 °F (35.9 °C)   TempSrc: Temporal   SpO2: 97%   Weight: 70.8 kg (156 lb)   Height: 152.4 cm (60\")     Estimated body mass index is 30.47 kg/m² as calculated from the following:    Height as of this encounter: 152.4 cm (60\").    Weight as of this encounter: 70.8 kg (156 lb).           Does the patient have evidence of cognitive impairment? No          HEALTH RISK ASSESSMENT    Smoking Status:  Social History     Tobacco Use   Smoking Status Former    Packs/day: 0.50    Years: 5.00    Additional pack years: 0.00    Total pack years: 2.50    Types: Cigarettes    Quit date:     Years since quittin.9    Passive exposure: Past   Smokeless Tobacco Never     Alcohol Consumption:  Social History     Substance and Sexual Activity   Alcohol Use Never     Fall Risk Screen:    STEADI Fall Risk Assessment was completed, and patient is at HIGH risk for falls. Assessment completed on:2023    Depression Screenin/12/2023     8:01 AM   PHQ-2/PHQ-9 Depression Screening   Little Interest or Pleasure in Doing Things 0-->not at all   Feeling Down, Depressed or Hopeless 0-->not at all   PHQ-9: Brief Depression Severity Measure Score 0       Health Habits and Functional and Cognitive Screenin/12/2023     7:59 AM   Functional & Cognitive Status   Do you have difficulty preparing food and eating? No   Do you have difficulty bathing yourself, getting dressed or grooming yourself? No   Do you have difficulty using the toilet? No   Do you " have difficulty moving around from place to place? No   Do you have trouble with steps or getting out of a bed or a chair? Yes   Current Diet Well Balanced Diet   Dental Exam Up to date        Dental Exam Comment dentures   Eye Exam Up to date   Exercise (times per week) 7 times per week   Current Exercises Include Other   Do you need help using the phone?  No   Are you deaf or do you have serious difficulty hearing?  No   Do you need help to go to places out of walking distance? No   Do you need help shopping? Yes   Do you need help preparing meals?  No   Do you need help with housework?  No   Do you need help with laundry? No   Do you need help taking your medications? No   Do you need help managing money? No   Do you ever drive or ride in a car without wearing a seat belt? No   Have you felt unusual stress, anger or loneliness in the last month? No   Who do you live with? Alone   If you need help, do you have trouble finding someone available to you? No   Have you been bothered in the last four weeks by sexual problems? No   Do you have difficulty concentrating, remembering or making decisions? Yes       Age-appropriate Screening Schedule:  Refer to the list below for future screening recommendations based on patient's age, sex and/or medical conditions. Orders for these recommended tests are listed in the plan section. The patient has been provided with a written plan.    Health Maintenance   Topic Date Due    DIABETIC EYE EXAM  04/07/2023    HEMOGLOBIN A1C  12/08/2023    TDAP/TD VACCINES (1 - Tdap) 12/12/2024 (Originally 10/14/1959)    ZOSTER VACCINE (1 of 2) 12/12/2024 (Originally 10/14/1992)    LIPID PANEL  06/08/2024    URINE MICROALBUMIN  06/08/2024    BMI FOLLOWUP  09/12/2024    ANNUAL WELLNESS VISIT  12/12/2024    DXA SCAN  01/11/2025    COVID-19 Vaccine  Completed    INFLUENZA VACCINE  Completed    Pneumococcal Vaccine 65+  Completed                  CMS Preventative Services Quick Reference  Risk  Factors Identified During Encounter  Immunizations Discussed/Encouraged: Tdap and Shingrix  The above risks/problems have been discussed with the patient.  Pertinent information has been shared with the patient in the After Visit Summary.  An After Visit Summary and PPPS were made available to the patient.    Follow Up:   Next Medicare Wellness visit to be scheduled in 1 year.       Additional E&M Note during same encounter follows:  Patient has multiple medical problems which are significant and separately identifiable that require additional work above and beyond the Medicare Wellness Visit.      Chief Complaint  Medicare Wellness-subsequent and Diabetes (Medication refills. )    Subjective        Patient is here today for her annual subsequent annual visit for Medicare    And then also her medication refills and fasting labs for the chronic comorbid conditions managed from the primary care standpoint    -- Type 2 diabetes non-insulin-dependent: Tolerating medications well with no side effects no issues reported no hypoglycemic episodes reported-and reports overall stable    -- Hypertension: Denies chest pain shortness of breath syncopal episodes dizziness or lightheadedness-- tolerating medications well with no side effects no issues reported and overall stable    -- Dyslipidemia: Tolerating medications well with no side effects no issues reported no myalgias reported overall stable    -- Cardiomyopathy with history of congestive heart failure: Tolerating medication well with no side effects no issues reported does report at times little bit of edema in the ankles and feet but that overall the Lasix takes care of that and overall reports stable and does still continue seeing her cardiologist regularly    -- Hypothyroidism: Tolerating medication well with no side effects no issues reported no hair or nail skin changes reported and overall stable    --Gout: Tolerating medication well with no side effects no issues  "reported no exacerbations or flareups reported overall stable    --Diabetic neuropathy and probably arthritis multiple joints involved with the osteoarthritis bilateral knees shoulders hands and feet-also with chronic kidney disease stage III which is very stable-tolerating her medications well with no side effects no issues reported Raz report is appropriate and yearly urine tox cream will be obtained today but in the past has always been appropriate-and patient reports that overall that the medications that she is on does improve her quality of life and shows good tolerability with no side effects no issues reported and no SI/HI showing no aberrant behaviors no signs and symptoms of misuse nor abuse and although she is in pain on a daily basis it does make the pain overall manageable      Nicole FÁTIMA Holliday is also being seen today for med refills and fasting labs     Review of Systems   Constitutional:  Negative for fatigue.   HENT:  Negative for trouble swallowing.    Eyes:  Negative for visual disturbance.   Respiratory:  Negative for choking and shortness of breath.    Cardiovascular:  Positive for leg swelling. Negative for chest pain.   Gastrointestinal:  Negative for abdominal pain and blood in stool.   Endocrine: Positive for polyuria. Negative for polydipsia and polyphagia.   Genitourinary:  Positive for frequency. Negative for dysuria and vaginal bleeding.   Musculoskeletal:  Positive for arthralgias and gait problem.        Knees and fingers shoulders feet    Neurological:  Negative for dizziness, seizures, syncope and light-headedness.   Hematological:  Bruises/bleeds easily.   Psychiatric/Behavioral:  Negative for self-injury and suicidal ideas.        Objective   Vital Signs:  /62 (BP Location: Left arm, Patient Position: Sitting, Cuff Size: Adult)   Pulse 64   Temp 96.7 °F (35.9 °C) (Temporal)   Ht 152.4 cm (60\")   Wt 70.8 kg (156 lb)   SpO2 97%   BMI 30.47 kg/m²     Physical " Exam  Vitals and nursing note reviewed.   Constitutional:       Appearance: Normal appearance.   HENT:      Head: Normocephalic.      Right Ear: External ear normal.      Left Ear: External ear normal.      Nose: Nose normal.      Mouth/Throat:      Mouth: Mucous membranes are moist.   Eyes:      Pupils: Pupils are equal, round, and reactive to light.   Cardiovascular:      Rate and Rhythm: Normal rate and regular rhythm.      Pulses:           Dorsalis pedis pulses are 2+ on the right side and 2+ on the left side.        Posterior tibial pulses are 2+ on the right side and 2+ on the left side.      Heart sounds: Normal heart sounds.   Pulmonary:      Effort: Pulmonary effort is normal.      Breath sounds: Normal breath sounds.   Abdominal:      Palpations: Abdomen is soft.      Tenderness: There is no abdominal tenderness.   Musculoskeletal:      Cervical back: Normal range of motion and neck supple.      Right foot: Deformity and bunion present.      Left foot: Deformity present.        Feet:       Comments: Ambulates with the cane --multiple joints affected with the osteoarthritis shoulders hands knees-and patient even reports that I have no joint or bone it all hurts but she still able to ambulate well she is able to do her own ADLs and her daughter checks on her on a daily basis   Feet:      Right foot:      Protective Sensation: 10 sites tested.  5 sites sensed.      Skin integrity: Callus and dry skin present. No ulcer or blister.      Toenail Condition: Right toenails are normal.      Left foot:      Protective Sensation: 10 sites tested.  3 sites sensed.      Skin integrity: Callus and dry skin present. No ulcer or blister.      Toenail Condition: Left toenails are normal.      Comments: Diabetic Foot Exam Performed and Monofilament Test Performed    Right better sensation than the left--as indicated      Skin:     General: Skin is warm and dry.   Neurological:      Mental Status: She is alert and oriented  to person, place, and time.   Psychiatric:         Mood and Affect: Mood normal.         Behavior: Behavior normal.         Thought Content: Thought content normal.         Judgment: Judgment normal.                         Assessment and Plan   Diagnoses and all orders for this visit:    1. Medicare annual wellness visit, subsequent (Primary)    2. Type 2 diabetes mellitus with stage 3 chronic kidney disease, without long-term current use of insulin, unspecified whether stage 3a or 3b CKD  -     pregabalin (Lyrica) 75 MG capsule; Take 1 capsule by mouth Every Night.  Dispense: 90 capsule; Refill: 0  -     Urinalysis With Culture If Indicated -  -     CBC & Differential  -     Comprehensive Metabolic Panel  -     Hemoglobin A1c  -     Lipid Panel  -     TSH Rfx On Abnormal To Free T4    3. Benign essential hypertension  -     levothyroxine (SYNTHROID, LEVOTHROID) 75 MCG tablet; Take 1 tablet by mouth Every Morning.  Dispense: 90 tablet; Refill: 1  -     metoprolol succinate XL (TOPROL-XL) 50 MG 24 hr tablet; Take 1 tablet by mouth 2 (Two) Times a Day.  Dispense: 180 tablet; Refill: 1  -     Urinalysis With Culture If Indicated -  -     CBC & Differential  -     Comprehensive Metabolic Panel  -     Lipid Panel  -     TSH Rfx On Abnormal To Free T4    4. Cardiomyopathy, unspecified type  -     clopidogrel (PLAVIX) 75 MG tablet; Take 1 tablet by mouth Daily.  Dispense: 90 tablet; Refill: 1  -     furosemide (LASIX) 20 MG tablet; Take 1 tablet by mouth Daily.  Dispense: 90 tablet; Refill: 1  -     metoprolol succinate XL (TOPROL-XL) 50 MG 24 hr tablet; Take 1 tablet by mouth 2 (Two) Times a Day.  Dispense: 180 tablet; Refill: 1  -     nitroglycerin (NITROSTAT) 0.4 MG SL tablet; Take 1 tablet by mouth Every 5 (Five) Minutes As Needed for Chest Pain.  Dispense: 25 tablet; Refill: 1  -     potassium chloride 10 MEQ CR tablet; Take 1 tablet by mouth Daily.  Dispense: 90 tablet; Refill: 1  -     Comprehensive Metabolic  Panel    5. Congestive heart failure, unspecified HF chronicity, unspecified heart failure type  -     furosemide (LASIX) 20 MG tablet; Take 1 tablet by mouth Daily.  Dispense: 90 tablet; Refill: 1  -     potassium chloride 10 MEQ CR tablet; Take 1 tablet by mouth Daily.  Dispense: 90 tablet; Refill: 1  -     Comprehensive Metabolic Panel    6. Mixed hyperlipidemia  -     atorvastatin (LIPITOR) 40 MG tablet; Take 1 tablet by mouth Daily.  Dispense: 90 tablet; Refill: 1  -     Comprehensive Metabolic Panel  -     Lipid Panel    7. Hypothyroidism, unspecified type  -     levothyroxine (SYNTHROID, LEVOTHROID) 75 MCG tablet; Take 1 tablet by mouth Every Morning.  Dispense: 90 tablet; Refill: 1  -     TSH Rfx On Abnormal To Free T4    8. Chronic gout without tophus, unspecified cause, unspecified site  -     allopurinol (ZYLOPRIM) 100 MG tablet; Take 1 tablet by mouth Daily.  Dispense: 90 tablet; Refill: 1  -     Uric Acid    9. Gastroesophageal reflux disease without esophagitis  -     pantoprazole (PROTONIX) 40 MG EC tablet; Take 1 tablet by mouth 2 (Two) Times a Day.  Dispense: 180 tablet; Refill: 1    10. Polyarthropathy  -     Diclofenac Sodium (VOLTAREN) 1 % gel gel; Apply 4 g topically to the appropriate area as directed 4 (Four) Times a Day As Needed (OA joint pain).  Dispense: 350 g; Refill: 1  -     traMADol (ULTRAM) 50 MG tablet; Take 1 tablet by mouth Every 8 (Eight) Hours As Needed for Moderate Pain or Severe Pain.  Dispense: 270 tablet; Refill: 0  -     Urine Drug Screen - Urine, Clean Catch    11. Stage 3b chronic kidney disease  -     furosemide (LASIX) 20 MG tablet; Take 1 tablet by mouth Daily.  Dispense: 90 tablet; Refill: 1  -     potassium chloride 10 MEQ CR tablet; Take 1 tablet by mouth Daily.  Dispense: 90 tablet; Refill: 1  -     pregabalin (Lyrica) 75 MG capsule; Take 1 capsule by mouth Every Night.  Dispense: 90 capsule; Refill: 0  -     Urine Drug Screen - Urine, Clean Catch    12. Peripheral  nerve disease  -     pregabalin (Lyrica) 75 MG capsule; Take 1 capsule by mouth Every Night.  Dispense: 90 capsule; Refill: 0  -     traMADol (ULTRAM) 50 MG tablet; Take 1 tablet by mouth Every 8 (Eight) Hours As Needed for Moderate Pain or Severe Pain.  Dispense: 270 tablet; Refill: 0  -     Urine Drug Screen - Urine, Clean Catch    13. Primary osteoarthritis of right knee  -     traMADol (ULTRAM) 50 MG tablet; Take 1 tablet by mouth Every 8 (Eight) Hours As Needed for Moderate Pain or Severe Pain.  Dispense: 270 tablet; Refill: 0  -     Urine Drug Screen - Urine, Clean Catch    14. High risk medication use  -     Urine Drug Screen - Urine, Clean Catch    Medications refilled as noted above and faxing the tramadol and Lyrica to [facility] and then obtaining all the labs today and patient will follow-up in 3 months         Follow Up   Return in about 3 months (around 3/12/2024), or if symptoms worsen or fail to improve, for Recheck.  Patient was given instructions and counseling regarding her condition or for health maintenance advice. Please see specific information pulled into the AVS if appropriate.

## 2023-12-12 NOTE — PROGRESS NOTES
..  Venipuncture Blood Specimen Collection  Venipuncture performed in left arm by Jahaira Lawson with good hemostasis. Patient tolerated the procedure well without complications.   12/12/23   Jahaira Lawson

## 2023-12-13 DIAGNOSIS — E83.52 HYPERCALCEMIA: Primary | ICD-10-CM

## 2023-12-13 LAB — BACTERIA SPEC AEROBE CULT: NORMAL

## 2023-12-13 NOTE — PROGRESS NOTES
Please mail letter to patient stating    Effie the urinalysis shows 3+ glucose spillage and 1+ leukocytes no blood and no nitrates only trace protein and microscopically no bacteria was seen and no blood in the urine--the comprehensive panel shows normal glucose and normal liver function tests and kidney function test have actually improved a little bit and show good stable chronic kidney disease stage III and then the electrolytes were all normal with the exception of calcium at 10.7 and I we will drop in another lab to repeat the calcium with a parathyroid hormone level-but I do not want you to get this done for another week and this is nonfasting stay well-hydrated and hold your vitamin D or calcium that you are taking and we will see if that is what is caused this to elevate slightly    Blood counts normal range; hemoglobin A1c shows very good stable diabetic control with an A1c of 5.7%; and the cholesterol panel was normal and thyroid levels were normal and uric acid levels normal; and the yearly urine tox screen was negative

## 2023-12-22 ENCOUNTER — CLINICAL SUPPORT (OUTPATIENT)
Dept: FAMILY MEDICINE CLINIC | Facility: CLINIC | Age: 83
End: 2023-12-22
Payer: MEDICARE

## 2023-12-22 DIAGNOSIS — N18.30 TYPE 2 DIABETES MELLITUS WITH STAGE 3 CHRONIC KIDNEY DISEASE, WITHOUT LONG-TERM CURRENT USE OF INSULIN, UNSPECIFIED WHETHER STAGE 3A OR 3B CKD: Primary | ICD-10-CM

## 2023-12-22 DIAGNOSIS — E78.2 MIXED HYPERLIPIDEMIA: ICD-10-CM

## 2023-12-22 DIAGNOSIS — E11.22 TYPE 2 DIABETES MELLITUS WITH STAGE 3 CHRONIC KIDNEY DISEASE, WITHOUT LONG-TERM CURRENT USE OF INSULIN, UNSPECIFIED WHETHER STAGE 3A OR 3B CKD: Primary | ICD-10-CM

## 2023-12-22 DIAGNOSIS — M13.0 POLYARTHROPATHY: ICD-10-CM

## 2023-12-22 DIAGNOSIS — M1A.9XX0 CHRONIC GOUT WITHOUT TOPHUS, UNSPECIFIED CAUSE, UNSPECIFIED SITE: ICD-10-CM

## 2023-12-22 DIAGNOSIS — N18.32 STAGE 3B CHRONIC KIDNEY DISEASE: ICD-10-CM

## 2023-12-22 DIAGNOSIS — E03.9 HYPOTHYROIDISM, UNSPECIFIED TYPE: ICD-10-CM

## 2023-12-22 LAB
CA-I BLD-MCNC: 5.7 MG/DL (ref 4.6–5.4)
CA-I SERPL ISE-MCNC: 1.42 MMOL/L (ref 1.15–1.35)
CALCIUM SPEC-SCNC: 10.7 MG/DL (ref 8.6–10.5)
MAGNESIUM SERPL-MCNC: 1.6 MG/DL (ref 1.6–2.4)
PHOSPHATE SERPL-MCNC: 3 MG/DL (ref 2.5–4.5)
PTH-INTACT SERPL-MCNC: 81.5 PG/ML (ref 15–65)

## 2023-12-22 PROCEDURE — 82397 CHEMILUMINESCENT ASSAY: CPT | Performed by: NURSE PRACTITIONER

## 2023-12-22 PROCEDURE — 83735 ASSAY OF MAGNESIUM: CPT | Performed by: NURSE PRACTITIONER

## 2023-12-22 PROCEDURE — 82330 ASSAY OF CALCIUM: CPT | Performed by: NURSE PRACTITIONER

## 2023-12-22 PROCEDURE — 84100 ASSAY OF PHOSPHORUS: CPT | Performed by: NURSE PRACTITIONER

## 2023-12-22 PROCEDURE — 83970 ASSAY OF PARATHORMONE: CPT | Performed by: NURSE PRACTITIONER

## 2023-12-22 PROCEDURE — 82310 ASSAY OF CALCIUM: CPT | Performed by: NURSE PRACTITIONER

## 2023-12-22 PROCEDURE — 84155 ASSAY OF PROTEIN SERUM: CPT | Performed by: NURSE PRACTITIONER

## 2023-12-22 PROCEDURE — 84165 PROTEIN E-PHORESIS SERUM: CPT | Performed by: NURSE PRACTITIONER

## 2023-12-22 NOTE — PROGRESS NOTES
Venipuncture Blood Specimen Collection  Venipuncture performed in [place] by Miranda Quiroga with good hemostasis. Patient tolerated the procedure well without complications.   12/22/23   Miranda Quiroga

## 2023-12-26 ENCOUNTER — TELEPHONE (OUTPATIENT)
Dept: FAMILY MEDICINE CLINIC | Facility: CLINIC | Age: 83
End: 2023-12-26

## 2023-12-26 LAB
ALBUMIN SERPL ELPH-MCNC: 3.6 G/DL (ref 2.9–4.4)
ALBUMIN/GLOB SERPL: 1.1 {RATIO} (ref 0.7–1.7)
ALPHA1 GLOB SERPL ELPH-MCNC: 0.2 G/DL (ref 0–0.4)
ALPHA2 GLOB SERPL ELPH-MCNC: 0.8 G/DL (ref 0.4–1)
B-GLOBULIN SERPL ELPH-MCNC: 1 G/DL (ref 0.7–1.3)
GAMMA GLOB SERPL ELPH-MCNC: 1.2 G/DL (ref 0.4–1.8)
GLOBULIN SER CALC-MCNC: 3.2 G/DL (ref 2.2–3.9)
LABORATORY COMMENT REPORT: NORMAL
M PROTEIN SERPL ELPH-MCNC: NORMAL G/DL
PROT SERPL-MCNC: 6.8 G/DL (ref 6–8.5)

## 2023-12-26 NOTE — TELEPHONE ENCOUNTER
"    Caller: Nicole Holliday R \"Effie\"    Relationship to patient: Self    Best call back number: [phone]    Patient is needing: PATIENT IS CALLING REGARDING     traMADol (ULTRAM) 50 MG tablet  50 mg, Every 8 Hours PRN   AND   pregabalin (Lyrica) 75 MG capsule  75 mg, Nightly   SHE STATES SHE RECEIVED THE OTHER PRESCRIPTIONS THROUGH MAIL ORDER BUT NOT THESE TWO AND WOULD LIKE A CALL BACK PLEASE REGARDING STATUS OF THESE PRESCRIPTIONS. PATIENT IS OUT OF MEDICATION.      [facility] - [id number] - [phone]  - [phone]   "

## 2023-12-29 LAB — PTH RELATED PROT SERPL-SCNC: <2 PMOL/L

## 2024-03-11 ENCOUNTER — OFFICE VISIT (OUTPATIENT)
Dept: FAMILY MEDICINE CLINIC | Facility: CLINIC | Age: 84
End: 2024-03-11
Payer: MEDICARE

## 2024-03-11 VITALS
TEMPERATURE: 96.9 F | BODY MASS INDEX: 33.01 KG/M2 | SYSTOLIC BLOOD PRESSURE: 122 MMHG | HEART RATE: 77 BPM | DIASTOLIC BLOOD PRESSURE: 70 MMHG | OXYGEN SATURATION: 96 % | WEIGHT: 169 LBS

## 2024-03-11 DIAGNOSIS — E11.22 TYPE 2 DIABETES MELLITUS WITH STAGE 3 CHRONIC KIDNEY DISEASE, WITHOUT LONG-TERM CURRENT USE OF INSULIN, UNSPECIFIED WHETHER STAGE 3A OR 3B CKD: Primary | ICD-10-CM

## 2024-03-11 DIAGNOSIS — R60.0 FLUID RETENTION IN LEGS: ICD-10-CM

## 2024-03-11 DIAGNOSIS — N18.30 TYPE 2 DIABETES MELLITUS WITH STAGE 3 CHRONIC KIDNEY DISEASE, WITHOUT LONG-TERM CURRENT USE OF INSULIN, UNSPECIFIED WHETHER STAGE 3A OR 3B CKD: Primary | ICD-10-CM

## 2024-03-11 DIAGNOSIS — M17.11 PRIMARY OSTEOARTHRITIS OF RIGHT KNEE: ICD-10-CM

## 2024-03-11 DIAGNOSIS — N18.31 STAGE 3A CHRONIC KIDNEY DISEASE: ICD-10-CM

## 2024-03-11 DIAGNOSIS — E83.52 HYPERCALCEMIA: ICD-10-CM

## 2024-03-11 DIAGNOSIS — M13.0 POLYARTHROPATHY: ICD-10-CM

## 2024-03-11 DIAGNOSIS — Z86.79 HISTORY OF CHRONIC CHF: ICD-10-CM

## 2024-03-11 DIAGNOSIS — G62.9 PERIPHERAL NERVE DISEASE: ICD-10-CM

## 2024-03-11 DIAGNOSIS — R79.89 ELEVATED PTHRP LEVEL: ICD-10-CM

## 2024-03-11 DIAGNOSIS — I42.9 CARDIOMYOPATHY, UNSPECIFIED TYPE: ICD-10-CM

## 2024-03-11 LAB
ANION GAP SERPL CALCULATED.3IONS-SCNC: 11.3 MMOL/L (ref 5–15)
BUN SERPL-MCNC: 28 MG/DL (ref 8–23)
BUN/CREAT SERPL: 20.4 (ref 7–25)
CA-I BLD-MCNC: 5.8 MG/DL (ref 4.6–5.4)
CA-I SERPL ISE-MCNC: 1.46 MMOL/L (ref 1.15–1.35)
CALCIUM SPEC-SCNC: 10.4 MG/DL (ref 8.6–10.5)
CALCIUM SPEC-SCNC: 10.5 MG/DL (ref 8.6–10.5)
CHLORIDE SERPL-SCNC: 108 MMOL/L (ref 98–107)
CO2 SERPL-SCNC: 25.7 MMOL/L (ref 22–29)
CREAT SERPL-MCNC: 1.37 MG/DL (ref 0.57–1)
EGFRCR SERPLBLD CKD-EPI 2021: 38.9 ML/MIN/1.73
GLUCOSE SERPL-MCNC: 87 MG/DL (ref 65–99)
HBA1C MFR BLD: 5.6 % (ref 4.8–5.6)
MAGNESIUM SERPL-MCNC: 1.7 MG/DL (ref 1.6–2.4)
PHOSPHATE SERPL-MCNC: 3.4 MG/DL (ref 2.5–4.5)
POTASSIUM SERPL-SCNC: 4.2 MMOL/L (ref 3.5–5.2)
PTH-INTACT SERPL-MCNC: 138 PG/ML (ref 15–65)
SODIUM SERPL-SCNC: 145 MMOL/L (ref 136–145)
T4 FREE SERPL-MCNC: 1.28 NG/DL (ref 0.93–1.7)
TSH SERPL DL<=0.05 MIU/L-ACNC: 1.56 UIU/ML (ref 0.27–4.2)

## 2024-03-11 PROCEDURE — 84439 ASSAY OF FREE THYROXINE: CPT | Performed by: NURSE PRACTITIONER

## 2024-03-11 PROCEDURE — 83735 ASSAY OF MAGNESIUM: CPT | Performed by: NURSE PRACTITIONER

## 2024-03-11 PROCEDURE — 83970 ASSAY OF PARATHORMONE: CPT | Performed by: NURSE PRACTITIONER

## 2024-03-11 PROCEDURE — 84155 ASSAY OF PROTEIN SERUM: CPT | Performed by: NURSE PRACTITIONER

## 2024-03-11 PROCEDURE — 84443 ASSAY THYROID STIM HORMONE: CPT | Performed by: NURSE PRACTITIONER

## 2024-03-11 PROCEDURE — 84100 ASSAY OF PHOSPHORUS: CPT | Performed by: NURSE PRACTITIONER

## 2024-03-11 PROCEDURE — 80048 BASIC METABOLIC PNL TOTAL CA: CPT | Performed by: NURSE PRACTITIONER

## 2024-03-11 PROCEDURE — 84165 PROTEIN E-PHORESIS SERUM: CPT | Performed by: NURSE PRACTITIONER

## 2024-03-11 PROCEDURE — 82330 ASSAY OF CALCIUM: CPT | Performed by: NURSE PRACTITIONER

## 2024-03-11 PROCEDURE — 82397 CHEMILUMINESCENT ASSAY: CPT | Performed by: NURSE PRACTITIONER

## 2024-03-11 PROCEDURE — 83036 HEMOGLOBIN GLYCOSYLATED A1C: CPT | Performed by: NURSE PRACTITIONER

## 2024-03-11 RX ORDER — TRAMADOL HYDROCHLORIDE 50 MG/1
50 TABLET ORAL EVERY 8 HOURS PRN
Qty: 270 TABLET | Refills: 0 | Status: SHIPPED | OUTPATIENT
Start: 2024-03-11

## 2024-03-11 RX ORDER — PREGABALIN 75 MG/1
75 CAPSULE ORAL NIGHTLY
Qty: 90 CAPSULE | Refills: 0 | Status: SHIPPED | OUTPATIENT
Start: 2024-03-11

## 2024-03-11 NOTE — PROGRESS NOTES
Answers submitted by the patient for this visit:  Primary Reason for Visit (Submitted on 3/9/2024)  What is the primary reason for your visit?: Diabetes  Diabetes Questionnaire (Submitted on 3/9/2024)  Chief Complaint: Diabetes problem  Below 70: occasionally  Chief Complaint  Pain (Medication refills )    Subjective            Ada FÁTIMA Holliday presents to [facility]  History of Present Illness  Pt is here for the med refills on the tramadol and lyrica--For her diabetic neuropathy and osteoarthritis pain-in patient is unable to take any nonsteroidal anti-inflammatories and does have history of chronic kidney disease as well as cardiomyopathy and is also on Plavix-and patient only takes medications as prescribed and shows no aberrant behaviors no signs and symptoms of misuse nor abuse and the Raz report is appropriate and yearly urine tox screen was appropriate and patient get everything through her mail order [facility] and overall patient reports that the medications do improve her quality of life and she is still able to live independently and do her own ADLs although her daughter checks on her frequently on at least a daily basis--denies any SI/HI     Then also reports that the pt is retaining fluid of the ankles an lower legs--and her daughter and pt are monitoring and hs not taken her diuretics as yet today and will once gets home--denies SOB and no CP or palpitation no dizziness no lightheadedness--and will be seeing cardiology/Dr. Luis in approximately 1 month    Also at the last lab draws patient did have an elevated calcium with an elevated PTH but everything else was normal and we will repeat today before we proceed with any further evaluation or referral  Diabetes  She has type 2 diabetes mellitus. MedicAlert identification noted. The initial diagnosis of diabetes was made 15 years ago. Pertinent negatives for hypoglycemia include no confusion, dizziness, headaches,  seizures, speech difficulty, sweats or tremors. Associated symptoms include foot paresthesias. Pertinent negatives for diabetes include no blurred vision, no chest pain, no fatigue, no foot ulcerations, no polydipsia, no polyuria and no weight loss. Pertinent negatives for hypoglycemia complications include no blackouts, no hospitalization, no nocturnal hypoglycemia, no required assistance and no required glucagon injection. Symptoms are stable. She is compliant with treatment most of the time. She is following a generally healthy diet. Meal planning includes avoidance of concentrated sweets. She never participates in exercise. She monitors blood glucose at home 1-2 x per day. Her highest blood glucose is 130-140 mg/dl. She does not see a podiatrist. Eye exam is current.   Pain  Associated symptoms include arthralgias. Pertinent negatives include no abdominal pain, chest pain, fatigue or headaches.       PHQ-2 Total Score:    PHQ-9 Total Score:      Past Medical History:   Diagnosis Date    Arthritis     Benign essential hypertension     Cancer     cervical    Cardiomyopathy     CKD (chronic kidney disease)     Coronary artery disease     Diabetes mellitus     GERD (gastroesophageal reflux disease)     Gout     HLD (hyperlipidemia)     Hypothyroidism        Allergies   Allergen Reactions    Contrast Dye (Echo Or Unknown Ct/Mr) Unknown - High Severity     Category: IV Contrast Dyes;       Penicillins Rash    Ibandronic Acid Nausea Only and Unknown - High Severity        Past Surgical History:   Procedure Laterality Date    CHOLECYSTECTOMY      CORONARY ANGIOPLASTY WITH STENT PLACEMENT      GASTRIC BYPASS      HYSTERECTOMY      ORIF WRIST FRACTURE Right 8/2/2023    Procedure: OPEN REDUCTION INTERNAL FIXATION DISTAL RADIUS;  Surgeon: Symone Lawson MD;  Location: [facility] OR [facility] [place];  Service: Orthopedics;  Laterality: Right;    VENTRICULAR CARDIAC PACEMAKER INSERTION      WRIST FRACTURE SURGERY          Social History      Tobacco Use    Smoking status: Former     Current packs/day: 0.00     Average packs/day: 0.5 packs/day for 5.0 years (2.5 ttl pk-yrs)     Types: Cigarettes     Start date:      Quit date:      Years since quittin.2     Passive exposure: Past    Smokeless tobacco: Never   Vaping Use    Vaping status: Never Used   Substance Use Topics    Alcohol use: Never    Drug use: Never       Family History   Problem Relation Age of Onset    Arthritis Father     Colon cancer Father     Colon cancer Brother         Health Maintenance Due   Topic Date Due    RSV Vaccine - Adults (1 - 1-dose 60+ series) Never done    DIABETIC EYE EXAM  2023        Current Outpatient Medications on File Prior to Visit   Medication Sig    allopurinol (ZYLOPRIM) 100 MG tablet Take 1 tablet by mouth Daily.    aspirin 81 MG EC tablet Take 1 tablet by mouth Daily. Last dose 23 per pt as directed.    atorvastatin (LIPITOR) 40 MG tablet Take 1 tablet by mouth Daily.    Blood Glucose Monitoring Suppl (FreeStyle Lite) device 1 each Daily.    JOHNSON PO Take  by mouth. Johnson gels at HS for the gout pain OTC    clopidogrel (PLAVIX) 75 MG tablet Take 1 tablet by mouth Daily.    dapagliflozin Propanediol 10 MG tablet Take 10 mg by mouth Daily. Per Dr Luis    Diclofenac Sodium (VOLTAREN) 1 % gel gel Apply 4 g topically to the appropriate area as directed 4 (Four) Times a Day As Needed (OA joint pain).    furosemide (LASIX) 20 MG tablet Take 1 tablet by mouth Daily.    glucose blood test strip 1 each by Other route Daily. Use as instructed    Lancets (freestyle) lancets 1 each by Other route Daily. Use as instructed    levothyroxine (SYNTHROID, LEVOTHROID) 75 MCG tablet Take 1 tablet by mouth Every Morning.    metoprolol succinate XL (TOPROL-XL) 50 MG 24 hr tablet Take 1 tablet by mouth 2 (Two) Times a Day.    nitroglycerin (NITROSTAT) 0.4 MG SL tablet Take 1 tablet by mouth Every 5 (Five) Minutes As Needed for Chest Pain.     pantoprazole (PROTONIX) 40 MG EC tablet Take 1 tablet by mouth 2 (Two) Times a Day.    potassium chloride 10 MEQ CR tablet Take 1 tablet by mouth Daily.    sacubitril-valsartan (ENTRESTO) 24-26 MG tablet Take 1 tablet by mouth 2 (Two) Times a Day.    vitamin D3 125 MCG (5000 UT) capsule capsule Take 2,000 Units by mouth Daily.    [DISCONTINUED] pregabalin (Lyrica) 75 MG capsule Take 1 capsule by mouth Every Night.    [DISCONTINUED] traMADol (ULTRAM) 50 MG tablet Take 1 tablet by mouth Every 8 (Eight) Hours As Needed for Moderate Pain or Severe Pain.     No current facility-administered medications on file prior to visit.       Immunization History   Administered Date(s) Administered    COVID-19 (MODERNA) 1st,2nd,3rd Dose Monovalent 01/21/2021, 02/23/2021, 11/03/2021    COVID-19 (MODERNA) BIVALENT 12+YRS 10/06/2022    COVID-19 F23 (PFIZER) 12YRS+ (COMIRNATY) 12/04/2023    FLUAD TRI 65YR+ 10/26/2017    Fluzone High Dose =>65 Years ([employer] ONLY) 10/27/2014, 10/28/2015, 10/26/2016    Fluzone High-Dose 65+yrs 09/27/2021, 09/28/2022    Hepatitis A 10/10/2018, 09/08/2020    Influenza, Unspecified 10/10/2018, 09/30/2019    Pneumococcal Conjugate 20-Valent (PCV20) 12/08/2022       Review of Systems   Constitutional:  Positive for unexpected weight gain. Negative for fatigue and unexpected weight loss.   HENT:  Negative for trouble swallowing.    Eyes:  Negative for blurred vision.   Respiratory:  Negative for shortness of breath.         Some SOBOE--pt reports unchanged    Cardiovascular:  Positive for leg swelling. Negative for chest pain and palpitations.   Gastrointestinal:  Negative for abdominal pain and blood in stool.   Endocrine: Negative for polydipsia and polyuria.   Genitourinary:  Negative for dysuria.   Musculoskeletal:  Positive for arthralgias and gait problem.   Neurological:  Negative for dizziness, tremors, seizures, speech difficulty, light-headedness and confusion.   Psychiatric/Behavioral:  Negative for  self-injury and suicidal ideas.         Objective     /70   Pulse 77   Temp 96.9 °F (36.1 °C)   Wt 76.7 kg (169 lb)   SpO2 96%   BMI 33.01 kg/m²       Physical Exam  Vitals and nursing note reviewed.   Constitutional:       Appearance: Normal appearance.   HENT:      Head: Normocephalic.      Right Ear: External ear normal.      Left Ear: External ear normal.      Nose: Nose normal.      Mouth/Throat:      Mouth: Mucous membranes are moist.   Eyes:      Pupils: Pupils are equal, round, and reactive to light.   Cardiovascular:      Rate and Rhythm: Normal rate and regular rhythm.      Heart sounds: Normal heart sounds.   Pulmonary:      Effort: Pulmonary effort is normal.      Breath sounds: Normal breath sounds.   Abdominal:      Palpations: Abdomen is soft.      Tenderness: There is no abdominal tenderness.   Musculoskeletal:      Cervical back: Normal range of motion and neck supple.      Right lower le+ Pitting Edema present.      Left lower le+ Pitting Edema present.      Comments: Ambulates with the cane --multiple joints affected with the osteoarthritis shoulders hands knees-and patient even reports that I have no joint or bone it all hurts but she still able to ambulate well she is able to do her own ADLs and her daughter checks on her on a daily basis    Skin:     General: Skin is warm and dry.   Neurological:      Mental Status: She is alert and oriented to person, place, and time.   Psychiatric:         Mood and Affect: Mood normal.         Behavior: Behavior normal.         Thought Content: Thought content normal.         Judgment: Judgment normal.         Result Review :                      REVIEWED THE VISIT WITH CARDIOLOGY DR AMANDA ON 1/3/24   XR Chest PA & Lateral (2024 08:35)      Assessment and Plan      Diagnoses and all orders for this visit:    1. Type 2 diabetes mellitus with stage 3 chronic kidney disease, without long-term current use of insulin, unspecified whether  stage 3a or 3b CKD (Primary)  -     pregabalin (Lyrica) 75 MG capsule; Take 1 capsule by mouth Every Night.  Dispense: 90 capsule; Refill: 0  -     Hemoglobin A1c    2. Peripheral nerve disease  -     pregabalin (Lyrica) 75 MG capsule; Take 1 capsule by mouth Every Night.  Dispense: 90 capsule; Refill: 0  -     traMADol (ULTRAM) 50 MG tablet; Take 1 tablet by mouth Every 8 (Eight) Hours As Needed for Moderate Pain or Severe Pain.  Dispense: 270 tablet; Refill: 0    3. Primary osteoarthritis of right knee  -     traMADol (ULTRAM) 50 MG tablet; Take 1 tablet by mouth Every 8 (Eight) Hours As Needed for Moderate Pain or Severe Pain.  Dispense: 270 tablet; Refill: 0    4. Stage 3a chronic kidney disease  -     Basic Metabolic Panel    5. Hypercalcemia  -     XR Chest PA & Lateral  -     Magnesium  -     TSH+Free T4  -     PTH, Intact & Calcium  -     PTH-related Peptide  -     Calcium, Ionized  -     Protein Electrophoresis, Total  -     Phosphorus    6. Polyarthropathy  -     traMADol (ULTRAM) 50 MG tablet; Take 1 tablet by mouth Every 8 (Eight) Hours As Needed for Moderate Pain or Severe Pain.  Dispense: 270 tablet; Refill: 0    7. Elevated PTHrP level  -     XR Chest PA & Lateral  -     Magnesium  -     TSH+Free T4  -     PTH, Intact & Calcium  -     PTH-related Peptide  -     Calcium, Ionized  -     Protein Electrophoresis, Total  -     Phosphorus    8. Fluid retention in legs  -     proBNP    9. Cardiomyopathy, unspecified type  -     XR Chest PA & Lateral  -     proBNP    10. History of chronic CHF  -     proBNP    Medication refilled and printed and we will fax for the patient on the 2 medications for her pain and diabetic neuropathy and then also we will obtain labs and chest x-ray with regards to the weight gain as well as the fluid retention although patient Is reporting at present time no shortness of breath that has worsened or changed and no chest pains or palpitations no dizziness no lightheadedness-and  she is only on Lasix 20 daily    I will fax all of these results once we have obtained them all and the office visit to Dr. Luis as well as call the results to the patient        Follow Up     Return in about 3 months (around 6/5/2024), or if symptoms worsen or fail to improve, for Recheck, fasting labs and refills.    Patient was given instructions and counseling regarding her condition or for health maintenance advice. Please see specific information pulled into the AVS if appropriate.            Nicole Holliday  reports that she quit smoking about 22 years ago. Her smoking use included cigarettes. She started smoking about 27 years ago. She has a 2.5 pack-year smoking history. She has been exposed to tobacco smoke. She has never used smokeless tobacco.. I have educated her on the risk of diseases from using tobacco products such as cancer, COPD, and heart disease.

## 2024-03-11 NOTE — PROGRESS NOTES
Venipuncture Blood Specimen Collection  Venipuncture performed in left arm by Lupe White with good hemostasis. Patient tolerated the procedure well without complications.   03/11/24   Lupe White

## 2024-03-11 NOTE — PROGRESS NOTES
Please call Effie and let her know that the CXR was normal/stable for her--and no signs of fluid in the lungs or around the heart--thank you

## 2024-03-12 LAB
ALBUMIN SERPL ELPH-MCNC: 3.5 G/DL (ref 2.9–4.4)
ALBUMIN/GLOB SERPL: 1 {RATIO} (ref 0.7–1.7)
ALPHA1 GLOB SERPL ELPH-MCNC: 0.2 G/DL (ref 0–0.4)
ALPHA2 GLOB SERPL ELPH-MCNC: 0.9 G/DL (ref 0.4–1)
B-GLOBULIN SERPL ELPH-MCNC: 1 G/DL (ref 0.7–1.3)
GAMMA GLOB SERPL ELPH-MCNC: 1.4 G/DL (ref 0.4–1.8)
GLOBULIN SER CALC-MCNC: 3.5 G/DL (ref 2.2–3.9)
LABORATORY COMMENT REPORT: NORMAL
M PROTEIN SERPL ELPH-MCNC: NORMAL G/DL
PROT SERPL-MCNC: 7 G/DL (ref 6–8.5)

## 2024-03-12 NOTE — PROGRESS NOTES
Please mail letter to patient stating    Effie the ionized calcium was elevated and the PTH which is the parathyroid hormone was also elevated and the glucose was normal range and you are a little bit on the dehydrated side and electrolytes were normal and kidney tests show stable chronic kidney disease stage III and magnesium and phosphorus were normal and thyroid was normal and hemoglobin A1c was normal and I am still awaiting 3 other labs--but with that ionized calcium and PTH levels being elevated I really should refer you on to nephrology-as this could be an association to the chronic kidney disease stage III-please let me know if you are willing to proceed with this referral-and we could also do a CT of the neck where the parathyroid glands are located or we could go the route of getting you referred for evaluation with endocrinology-please discuss this with Stacey and get back with me and let me know how you desire to proceed

## 2024-03-13 NOTE — PROGRESS NOTES
Please mail letter to patient stating    Effie the protein electrophoresis was normal with no M spike observed so I am leaning more towards the parathyroid hormone being elevated related to renal function but there is still another two labs pending

## 2024-03-15 LAB — PTH RELATED PROT SERPL-SCNC: <2 PMOL/L

## 2024-03-26 ENCOUNTER — TELEPHONE (OUTPATIENT)
Dept: FAMILY MEDICINE CLINIC | Facility: CLINIC | Age: 84
End: 2024-03-26
Payer: MEDICARE

## 2024-03-26 NOTE — TELEPHONE ENCOUNTER
Caller: JOSÉ LUIS BURNETTE    Relationship to patient: Emergency Contact    Best call back number: [phone]    Patient is needing: PATIENT'S DAUGHTER CALLED IN AND IS REQUESTING A CALL BACK FROM JHON TILLMAN REGARDING PATIENT'S RECENT LAB RESULTS. PATIENT'S DAUGHTER HAD SOME QUESTIONS AND IS REQUESTING A CALL BACK PLEASE    __________________________________    Called and spoke with patient's daughter, José Luis Burnette, nurse practitioner, and she had discussed in detail explaining everything to her mother and at this present time she does not want to proceed with a referral to nephrology nor does she want to do any further scans regarding the parathyroid hormone and they will make mention of this to her cardiologist when they had the visit with him which is Dr. Luis-and then we will just continue to monitor at this present time and if anything starts really declining with the kidney disease then she may reconsider

## 2024-04-10 ENCOUNTER — CLINICAL SUPPORT (OUTPATIENT)
Dept: FAMILY MEDICINE CLINIC | Facility: CLINIC | Age: 84
End: 2024-04-10
Payer: MEDICARE

## 2024-04-10 DIAGNOSIS — I10 BENIGN ESSENTIAL HYPERTENSION: ICD-10-CM

## 2024-04-10 DIAGNOSIS — N18.30 STAGE 3 CHRONIC KIDNEY DISEASE, UNSPECIFIED WHETHER STAGE 3A OR 3B CKD: ICD-10-CM

## 2024-04-10 DIAGNOSIS — I25.10 ARTERIOSCLEROSIS OF CORONARY ARTERY: ICD-10-CM

## 2024-04-10 DIAGNOSIS — M1A.9XX0 CHRONIC GOUT WITHOUT TOPHUS, UNSPECIFIED CAUSE, UNSPECIFIED SITE: Primary | ICD-10-CM

## 2024-04-10 DIAGNOSIS — E03.9 HYPOTHYROIDISM, UNSPECIFIED TYPE: ICD-10-CM

## 2024-04-10 LAB — NT-PROBNP SERPL-MCNC: 3530 PG/ML (ref 0–1800)

## 2024-04-10 PROCEDURE — 83880 ASSAY OF NATRIURETIC PEPTIDE: CPT | Performed by: NURSE PRACTITIONER

## 2024-04-10 PROCEDURE — 36415 COLL VENOUS BLD VENIPUNCTURE: CPT | Performed by: NURSE PRACTITIONER

## 2024-04-10 NOTE — PROGRESS NOTES
Venipuncture Blood Specimen Collection  Venipuncture performed in left arm  by Lupe White with good hemostasis. Patient tolerated the procedure well without complications.   04/10/24   Lupe White

## 2024-04-23 ENCOUNTER — TELEPHONE (OUTPATIENT)
Dept: FAMILY MEDICINE CLINIC | Facility: CLINIC | Age: 84
End: 2024-04-23

## 2024-04-23 DIAGNOSIS — I50.9 CONGESTIVE HEART FAILURE, UNSPECIFIED HF CHRONICITY, UNSPECIFIED HEART FAILURE TYPE: ICD-10-CM

## 2024-04-23 DIAGNOSIS — I42.9 CARDIOMYOPATHY, UNSPECIFIED TYPE: ICD-10-CM

## 2024-04-23 DIAGNOSIS — N18.32 STAGE 3B CHRONIC KIDNEY DISEASE: ICD-10-CM

## 2024-04-23 DIAGNOSIS — N18.31 STAGE 3A CHRONIC KIDNEY DISEASE: Primary | ICD-10-CM

## 2024-04-23 RX ORDER — FUROSEMIDE 40 MG/1
40 TABLET ORAL DAILY
Qty: 89 TABLET | Refills: 1 | Status: SHIPPED | OUTPATIENT
Start: 2024-04-23

## 2024-04-23 NOTE — TELEPHONE ENCOUNTER
"Caller: Nicole Holliday \"Effie\"    Relationship: Self    Best call back number: [phone]     What medication are you requesting: FUROSEMIDE - 40 MG    If a prescription is needed, what is your preferred pharmacy and phone number: [facility] - [address] - [phone]  - [phone] FX     Additional notes:    PATIENT STATES DR KAY HER CARDIOLOGIST WANTED HER DOSAGE CHANGED TO 40 MG.    _____________________________    Reviewed his OV notes from 4/3/24 visit and he did state will increase the lasix to 40 mg and stop the HCTZ and then PCP needs to recheck the BMP   "

## 2024-05-02 ENCOUNTER — CLINICAL SUPPORT (OUTPATIENT)
Dept: FAMILY MEDICINE CLINIC | Facility: CLINIC | Age: 84
End: 2024-05-02
Payer: MEDICARE

## 2024-05-02 DIAGNOSIS — I42.9 CARDIOMYOPATHY, UNSPECIFIED TYPE: ICD-10-CM

## 2024-05-02 DIAGNOSIS — N18.31 STAGE 3A CHRONIC KIDNEY DISEASE: ICD-10-CM

## 2024-05-02 DIAGNOSIS — I50.9 CONGESTIVE HEART FAILURE, UNSPECIFIED HF CHRONICITY, UNSPECIFIED HEART FAILURE TYPE: ICD-10-CM

## 2024-05-02 DIAGNOSIS — N18.32 STAGE 3B CHRONIC KIDNEY DISEASE: ICD-10-CM

## 2024-05-02 LAB
ANION GAP SERPL CALCULATED.3IONS-SCNC: 9.3 MMOL/L (ref 5–15)
BUN SERPL-MCNC: 45 MG/DL (ref 8–23)
BUN/CREAT SERPL: 24.9 (ref 7–25)
CALCIUM SPEC-SCNC: 10.2 MG/DL (ref 8.6–10.5)
CHLORIDE SERPL-SCNC: 105 MMOL/L (ref 98–107)
CO2 SERPL-SCNC: 25.7 MMOL/L (ref 22–29)
CREAT SERPL-MCNC: 1.81 MG/DL (ref 0.57–1)
EGFRCR SERPLBLD CKD-EPI 2021: 27.8 ML/MIN/1.73
GLUCOSE SERPL-MCNC: 90 MG/DL (ref 65–99)
POTASSIUM SERPL-SCNC: 4.4 MMOL/L (ref 3.5–5.2)
SODIUM SERPL-SCNC: 140 MMOL/L (ref 136–145)

## 2024-05-02 PROCEDURE — 80048 BASIC METABOLIC PNL TOTAL CA: CPT | Performed by: NURSE PRACTITIONER

## 2024-05-02 PROCEDURE — 36415 COLL VENOUS BLD VENIPUNCTURE: CPT | Performed by: NURSE PRACTITIONER

## 2024-05-03 NOTE — PROGRESS NOTES
Please fax the last 2 basic metabolic panels to Dr. Luis's office as her eGFR has dropped dramatically and her BUN has elevated dramatically--and I am unsure if it is from her recent start on Farxiga 10 mg for the heart failure or if there were diuretic adjustments made by cardiology    Then please call and let patient know the following and I know she has an upcoming visit with Dr. Luis next week    Effie we are faxing your last 2 basic metabolic panels done recently and then 1 done a month ago to Dr. Luis's office so that he knows that the BUN has elevated tremendously as well as your eGFR which is the filtration/function of the kidneys has dropped from stable chronic kidney disease stage III to chronic kidney disease stage IV and I believe this might be since he started the Farxiga 10 mg daily but I am not sure he may have made diuretic adjustments as well--and I know you follow-up with him next week

## 2024-06-10 ENCOUNTER — OFFICE VISIT (OUTPATIENT)
Dept: FAMILY MEDICINE CLINIC | Facility: CLINIC | Age: 84
End: 2024-06-10
Payer: MEDICARE

## 2024-06-10 VITALS
BODY MASS INDEX: 33.18 KG/M2 | OXYGEN SATURATION: 100 % | SYSTOLIC BLOOD PRESSURE: 126 MMHG | WEIGHT: 169 LBS | DIASTOLIC BLOOD PRESSURE: 74 MMHG | HEIGHT: 60 IN | HEART RATE: 77 BPM | TEMPERATURE: 96.9 F

## 2024-06-10 DIAGNOSIS — I42.9 CARDIOMYOPATHY, UNSPECIFIED TYPE: ICD-10-CM

## 2024-06-10 DIAGNOSIS — E78.2 MIXED HYPERLIPIDEMIA: ICD-10-CM

## 2024-06-10 DIAGNOSIS — M13.0 POLYARTHROPATHY: ICD-10-CM

## 2024-06-10 DIAGNOSIS — I10 BENIGN ESSENTIAL HYPERTENSION: ICD-10-CM

## 2024-06-10 DIAGNOSIS — M1A.9XX0 CHRONIC GOUT WITHOUT TOPHUS, UNSPECIFIED CAUSE, UNSPECIFIED SITE: ICD-10-CM

## 2024-06-10 DIAGNOSIS — E11.22 TYPE 2 DIABETES MELLITUS WITH STAGE 3 CHRONIC KIDNEY DISEASE, WITHOUT LONG-TERM CURRENT USE OF INSULIN, UNSPECIFIED WHETHER STAGE 3A OR 3B CKD: Primary | ICD-10-CM

## 2024-06-10 DIAGNOSIS — E03.9 HYPOTHYROIDISM, UNSPECIFIED TYPE: ICD-10-CM

## 2024-06-10 DIAGNOSIS — K21.9 GASTROESOPHAGEAL REFLUX DISEASE WITHOUT ESOPHAGITIS: ICD-10-CM

## 2024-06-10 DIAGNOSIS — G62.9 PERIPHERAL NERVE DISEASE: ICD-10-CM

## 2024-06-10 DIAGNOSIS — N18.30 TYPE 2 DIABETES MELLITUS WITH STAGE 3 CHRONIC KIDNEY DISEASE, WITHOUT LONG-TERM CURRENT USE OF INSULIN, UNSPECIFIED WHETHER STAGE 3A OR 3B CKD: Primary | ICD-10-CM

## 2024-06-10 DIAGNOSIS — E11.9 COMPREHENSIVE DIABETIC FOOT EXAMINATION, TYPE 2 DM, ENCOUNTER FOR: ICD-10-CM

## 2024-06-10 DIAGNOSIS — N18.32 STAGE 3B CHRONIC KIDNEY DISEASE: ICD-10-CM

## 2024-06-10 DIAGNOSIS — M17.11 PRIMARY OSTEOARTHRITIS OF RIGHT KNEE: ICD-10-CM

## 2024-06-10 DIAGNOSIS — I50.9 CONGESTIVE HEART FAILURE, UNSPECIFIED HF CHRONICITY, UNSPECIFIED HEART FAILURE TYPE: ICD-10-CM

## 2024-06-10 LAB
ALBUMIN SERPL-MCNC: 3.9 G/DL (ref 3.5–5.2)
ALBUMIN UR-MCNC: 3.9 MG/DL
ALBUMIN/GLOB SERPL: 1.3 G/DL
ALP SERPL-CCNC: 106 U/L (ref 39–117)
ALT SERPL W P-5'-P-CCNC: 15 U/L (ref 1–33)
ANION GAP SERPL CALCULATED.3IONS-SCNC: 10.1 MMOL/L (ref 5–15)
AST SERPL-CCNC: 21 U/L (ref 1–32)
BACTERIA UR QL AUTO: ABNORMAL /HPF
BASOPHILS # BLD AUTO: 0.04 10*3/MM3 (ref 0–0.2)
BASOPHILS NFR BLD AUTO: 0.6 % (ref 0–1.5)
BILIRUB SERPL-MCNC: 0.5 MG/DL (ref 0–1.2)
BILIRUB UR QL STRIP: NEGATIVE
BUN SERPL-MCNC: 33 MG/DL (ref 8–23)
BUN/CREAT SERPL: 17.7 (ref 7–25)
CALCIUM SPEC-SCNC: 10.3 MG/DL (ref 8.6–10.5)
CHLORIDE SERPL-SCNC: 107 MMOL/L (ref 98–107)
CHOLEST SERPL-MCNC: 128 MG/DL (ref 0–200)
CLARITY UR: CLEAR
CO2 SERPL-SCNC: 24.9 MMOL/L (ref 22–29)
COLOR UR: YELLOW
CREAT SERPL-MCNC: 1.86 MG/DL (ref 0.57–1)
DEPRECATED RDW RBC AUTO: 46.1 FL (ref 37–54)
EGFRCR SERPLBLD CKD-EPI 2021: 26.9 ML/MIN/1.73
EOSINOPHIL # BLD AUTO: 0.33 10*3/MM3 (ref 0–0.4)
EOSINOPHIL NFR BLD AUTO: 5.2 % (ref 0.3–6.2)
ERYTHROCYTE [DISTWIDTH] IN BLOOD BY AUTOMATED COUNT: 13 % (ref 12.3–15.4)
GLOBULIN UR ELPH-MCNC: 3.1 GM/DL
GLUCOSE SERPL-MCNC: 76 MG/DL (ref 65–99)
GLUCOSE UR STRIP-MCNC: ABNORMAL MG/DL
HBA1C MFR BLD: 5.8 % (ref 4.8–5.6)
HCT VFR BLD AUTO: 38.7 % (ref 34–46.6)
HDLC SERPL-MCNC: 38 MG/DL (ref 40–60)
HGB BLD-MCNC: 13.2 G/DL (ref 12–15.9)
HGB UR QL STRIP.AUTO: NEGATIVE
HOLD SPECIMEN: NORMAL
HYALINE CASTS UR QL AUTO: ABNORMAL /LPF
IMM GRANULOCYTES # BLD AUTO: 0.02 10*3/MM3 (ref 0–0.05)
IMM GRANULOCYTES NFR BLD AUTO: 0.3 % (ref 0–0.5)
KETONES UR QL STRIP: NEGATIVE
LDLC SERPL CALC-MCNC: 59 MG/DL (ref 0–100)
LDLC/HDLC SERPL: 1.4 {RATIO}
LEUKOCYTE ESTERASE UR QL STRIP.AUTO: ABNORMAL
LYMPHOCYTES # BLD AUTO: 1.98 10*3/MM3 (ref 0.7–3.1)
LYMPHOCYTES NFR BLD AUTO: 31.4 % (ref 19.6–45.3)
MCH RBC QN AUTO: 33.6 PG (ref 26.6–33)
MCHC RBC AUTO-ENTMCNC: 34.1 G/DL (ref 31.5–35.7)
MCV RBC AUTO: 98.5 FL (ref 79–97)
MONOCYTES # BLD AUTO: 0.63 10*3/MM3 (ref 0.1–0.9)
MONOCYTES NFR BLD AUTO: 10 % (ref 5–12)
NEUTROPHILS NFR BLD AUTO: 3.3 10*3/MM3 (ref 1.7–7)
NEUTROPHILS NFR BLD AUTO: 52.5 % (ref 42.7–76)
NITRITE UR QL STRIP: NEGATIVE
NRBC BLD AUTO-RTO: 0 /100 WBC (ref 0–0.2)
PH UR STRIP.AUTO: 5.5 [PH] (ref 5–8)
PLATELET # BLD AUTO: 193 10*3/MM3 (ref 140–450)
PMV BLD AUTO: 12.1 FL (ref 6–12)
POTASSIUM SERPL-SCNC: 4.4 MMOL/L (ref 3.5–5.2)
PROT SERPL-MCNC: 7 G/DL (ref 6–8.5)
PROT UR QL STRIP: ABNORMAL
RBC # BLD AUTO: 3.93 10*6/MM3 (ref 3.77–5.28)
RBC # UR STRIP: ABNORMAL /HPF
REF LAB TEST METHOD: ABNORMAL
SODIUM SERPL-SCNC: 142 MMOL/L (ref 136–145)
SP GR UR STRIP: 1.02 (ref 1–1.03)
SQUAMOUS #/AREA URNS HPF: ABNORMAL /HPF
TRIGL SERPL-MCNC: 184 MG/DL (ref 0–150)
TSH SERPL DL<=0.05 MIU/L-ACNC: 3.25 UIU/ML (ref 0.27–4.2)
URATE SERPL-MCNC: 4.4 MG/DL (ref 2.4–5.7)
UROBILINOGEN UR QL STRIP: ABNORMAL
VLDLC SERPL-MCNC: 31 MG/DL (ref 5–40)
WBC # UR STRIP: ABNORMAL /HPF
WBC NRBC COR # BLD AUTO: 6.3 10*3/MM3 (ref 3.4–10.8)

## 2024-06-10 PROCEDURE — 3074F SYST BP LT 130 MM HG: CPT | Performed by: NURSE PRACTITIONER

## 2024-06-10 PROCEDURE — 80061 LIPID PANEL: CPT | Performed by: NURSE PRACTITIONER

## 2024-06-10 PROCEDURE — 1159F MED LIST DOCD IN RCRD: CPT | Performed by: NURSE PRACTITIONER

## 2024-06-10 PROCEDURE — 80053 COMPREHEN METABOLIC PANEL: CPT | Performed by: NURSE PRACTITIONER

## 2024-06-10 PROCEDURE — 3078F DIAST BP <80 MM HG: CPT | Performed by: NURSE PRACTITIONER

## 2024-06-10 PROCEDURE — 83036 HEMOGLOBIN GLYCOSYLATED A1C: CPT | Performed by: NURSE PRACTITIONER

## 2024-06-10 PROCEDURE — 81001 URINALYSIS AUTO W/SCOPE: CPT | Performed by: NURSE PRACTITIONER

## 2024-06-10 PROCEDURE — 87086 URINE CULTURE/COLONY COUNT: CPT | Performed by: NURSE PRACTITIONER

## 2024-06-10 PROCEDURE — 1125F AMNT PAIN NOTED PAIN PRSNT: CPT | Performed by: NURSE PRACTITIONER

## 2024-06-10 PROCEDURE — 85025 COMPLETE CBC W/AUTO DIFF WBC: CPT | Performed by: NURSE PRACTITIONER

## 2024-06-10 PROCEDURE — 99214 OFFICE O/P EST MOD 30 MIN: CPT | Performed by: NURSE PRACTITIONER

## 2024-06-10 PROCEDURE — 1160F RVW MEDS BY RX/DR IN RCRD: CPT | Performed by: NURSE PRACTITIONER

## 2024-06-10 PROCEDURE — 84550 ASSAY OF BLOOD/URIC ACID: CPT | Performed by: NURSE PRACTITIONER

## 2024-06-10 PROCEDURE — 84443 ASSAY THYROID STIM HORMONE: CPT | Performed by: NURSE PRACTITIONER

## 2024-06-10 PROCEDURE — 82043 UR ALBUMIN QUANTITATIVE: CPT | Performed by: NURSE PRACTITIONER

## 2024-06-10 PROCEDURE — 36415 COLL VENOUS BLD VENIPUNCTURE: CPT | Performed by: NURSE PRACTITIONER

## 2024-06-10 RX ORDER — METOPROLOL SUCCINATE 50 MG/1
50 TABLET, EXTENDED RELEASE ORAL 2 TIMES DAILY
Qty: 180 TABLET | Refills: 1 | Status: SHIPPED | OUTPATIENT
Start: 2024-06-10

## 2024-06-10 RX ORDER — POTASSIUM CHLORIDE 750 MG/1
10 TABLET, FILM COATED, EXTENDED RELEASE ORAL DAILY
Qty: 90 TABLET | Refills: 1 | Status: SHIPPED | OUTPATIENT
Start: 2024-06-10

## 2024-06-10 RX ORDER — PREGABALIN 75 MG/1
75 CAPSULE ORAL NIGHTLY
Qty: 90 CAPSULE | Refills: 0 | Status: SHIPPED | OUTPATIENT
Start: 2024-06-10 | End: 2024-06-10

## 2024-06-10 RX ORDER — LEVOTHYROXINE SODIUM 0.07 MG/1
75 TABLET ORAL
Qty: 90 TABLET | Refills: 1 | Status: SHIPPED | OUTPATIENT
Start: 2024-06-10

## 2024-06-10 RX ORDER — PREGABALIN 75 MG/1
75 CAPSULE ORAL NIGHTLY
Qty: 90 CAPSULE | Refills: 0 | Status: SHIPPED | OUTPATIENT
Start: 2024-06-10

## 2024-06-10 RX ORDER — TRAMADOL HYDROCHLORIDE 50 MG/1
50 TABLET ORAL EVERY 8 HOURS PRN
Qty: 270 TABLET | Refills: 0 | Status: SHIPPED | OUTPATIENT
Start: 2024-06-10

## 2024-06-10 RX ORDER — ALLOPURINOL 100 MG/1
100 TABLET ORAL DAILY
Qty: 90 TABLET | Refills: 1 | Status: SHIPPED | OUTPATIENT
Start: 2024-06-10

## 2024-06-10 RX ORDER — HYDROCHLOROTHIAZIDE 12.5 MG/1
12.5 CAPSULE, GELATIN COATED ORAL DAILY
COMMUNITY
End: 2024-06-10 | Stop reason: ALTCHOICE

## 2024-06-10 RX ORDER — CLOPIDOGREL BISULFATE 75 MG/1
75 TABLET ORAL DAILY
Qty: 90 TABLET | Refills: 1 | Status: SHIPPED | OUTPATIENT
Start: 2024-06-10

## 2024-06-10 RX ORDER — LANCETS 28 GAUGE
1 EACH MISCELLANEOUS DAILY
Qty: 100 EACH | Refills: 3 | Status: SHIPPED | OUTPATIENT
Start: 2024-06-10

## 2024-06-10 RX ORDER — PANTOPRAZOLE SODIUM 40 MG/1
40 TABLET, DELAYED RELEASE ORAL DAILY
Qty: 90 TABLET | Refills: 1 | Status: SHIPPED | OUTPATIENT
Start: 2024-06-10

## 2024-06-10 RX ORDER — ATORVASTATIN CALCIUM 40 MG/1
40 TABLET, FILM COATED ORAL DAILY
Qty: 90 TABLET | Refills: 1 | Status: SHIPPED | OUTPATIENT
Start: 2024-06-10

## 2024-06-10 RX ORDER — NITROGLYCERIN 0.4 MG/1
0.4 TABLET SUBLINGUAL
Qty: 25 TABLET | Refills: 1 | Status: SHIPPED | OUTPATIENT
Start: 2024-06-10

## 2024-06-10 RX ORDER — FUROSEMIDE 40 MG/1
TABLET ORAL
Qty: 90 TABLET | Refills: 1 | Status: SHIPPED | OUTPATIENT
Start: 2024-06-10

## 2024-06-10 RX ORDER — TRAMADOL HYDROCHLORIDE 50 MG/1
50 TABLET ORAL EVERY 8 HOURS PRN
Qty: 270 TABLET | Refills: 0 | Status: SHIPPED | OUTPATIENT
Start: 2024-06-10 | End: 2024-06-10

## 2024-06-10 NOTE — PROGRESS NOTES
Answers submitted by the patient for this visit:  Primary Reason for Visit (Submitted on 6/5/2024)  What is the primary reason for your visit?: Diabetes  Diabetes Questionnaire (Submitted on 6/5/2024)  Chief Complaint: Diabetes problem  Below 70: occasionally  Chief Complaint  Diabetes (3 month check up and fasting lab.  Medication refills. )    Subjective            Ada FÁTIMA Holliday presents to [facility]  History of Present Illness  Patient is here today for medication refills on the chronic comorbid conditions managed with primary care standpoint and to obtain fasting labs patient feels all of her medications through [facility]    -- Diabetes type 2 non-insulin-dependent: Tolerating medications well with no side effects no issues reported no hypoglycemic episodes reported and overall receiving good efficacy and good stability    -- Hypertension with cardiomyopathy: Tolerating medication well with no side effects no issues reported denies any overt chest pain shortness of breath syncopal episodes dizziness or lightheadedness although she does still experience some edema with the legs and continue seeing cardiology regularly    -- Congestive heart failure with edema: Does continue seeing Dr. Luis on a regular basis who is her cardiologist-and he stopped the HCTZ 12.5 and increase the Lasix from 20 to 40 mg daily and then she now reports that he actually has her doing 40 mg Lasix 4 days weekly and 20 mg 3 days weekly overall good stability and good efficacy    -- Dyslipidemia: Tolerating medications well with no side effects no issues reported no myalgias overall stable    -- Hypothyroidism: Tolerating medication well without side effects no issues reported no hair or nail skin issues reported and no overt fatigue overall stable    -- GERD: Tolerating medication very well with no breakthrough symptoms reported and patient since gastroenterology had placed her on the Protonix 40 mg  had been on this twice daily and I did discuss with the patient the risks of remaining on this long-term and patient is willing to cut back to once daily and then if she starts receiving any type of breakthrough symptoms she can reach out to me or her daughter again and we will add the famotidine 40 mg    -- Gout: Tolerating medication well with no side effects no issues reported no exacerbations or flareups reported overall stable    -- Chronic kidney disease stage III: Tolerating medications well with no side effects no issues reported overall stable    -- Polyarthropathy and peripheral neuropathy: Tolerating medications well with no side effects no issues reported does not show any signs or symptoms of misuse nor abuse and no aberrant behaviors patient follows up every 3 months and does have chronic kidney disease stage III as well as heart issues and is unable to take any nonsteroidal anti-inflammatories orally-and this does overall improve her quality of life and she is able to still live independently and do her own ADLs and her daughter checks in frequently on her on a daily basis and Raz report was appropriate and yearly urine tox screen was also appropriate  Diabetes  She has type 2 diabetes mellitus. MedicAlert identification noted. The initial diagnosis of diabetes was made 12 years ago. Hypoglycemia symptoms include sweats and tremors. Pertinent negatives for hypoglycemia include no confusion, dizziness, headaches, seizures or speech difficulty. Associated symptoms include blurred vision and foot paresthesias. Pertinent negatives for diabetes include no chest pain, no fatigue, no foot ulcerations, no polydipsia, no polyuria and no weight loss. Pertinent negatives for hypoglycemia complications include no blackouts, no hospitalization, no nocturnal hypoglycemia, no required assistance and no required glucagon injection. Symptoms are stable. Diabetic complications include heart disease, nephropathy and  peripheral neuropathy. Risk factors for coronary artery disease include diabetes mellitus, dyslipidemia, hypertension and post-menopausal. She is compliant with treatment most of the time. She is following a generally healthy diet. Meal planning includes avoidance of concentrated sweets. She never participates in exercise. She monitors blood glucose at home 3-4 x per week. Her highest blood glucose is 130-140 mg/dl. An ACE inhibitor/angiotensin II receptor blocker is being taken. She does not see a podiatrist. Eye exam is current.       PHQ-2 Total Score: 0  PHQ-9 Total Score: 0    Past Medical History:   Diagnosis Date    Allergic     Iodine    Anemia     Arthritis     Benign essential hypertension     Cancer     cervical    Cardiomyopathy     CHF (congestive heart failure) 2015    Cholelithiasis     Surgical removal    CKD (chronic kidney disease)     Coronary artery disease     Diabetes mellitus     GERD (gastroesophageal reflux disease)     Gout     HLD (hyperlipidemia)     Hypothyroidism     Myocardial infarction     Osteopenia     Renal insufficiency        Allergies   Allergen Reactions    Contrast Dye (Echo Or Unknown Ct/Mr) Unknown - High Severity     Category: IV Contrast Dyes;       Penicillins Rash    Ibandronic Acid Nausea Only and Unknown - High Severity        Past Surgical History:   Procedure Laterality Date    BARIATRIC SURGERY      CARDIAC CATHETERIZATION      CHOLECYSTECTOMY      COLONOSCOPY      CORONARY ANGIOPLASTY WITH STENT PLACEMENT      CORONARY STENT PLACEMENT      FRACTURE SURGERY  9/7/23    Internal ORIF    GASTRIC BYPASS      HYSTERECTOMY      ORIF WRIST FRACTURE Right 08/02/2023    Procedure: OPEN REDUCTION INTERNAL FIXATION DISTAL RADIUS;  Surgeon: Symone Lawson MD;  Location: [facility] OR [facility] [place];  Service: Orthopedics;  Laterality: Right;    VENTRICULAR CARDIAC PACEMAKER INSERTION      WRIST FRACTURE SURGERY          Social History     Tobacco Use    Smoking status: Former     Current  packs/day: 0.00     Average packs/day: 0.9 packs/day for 17.5 years (15.0 ttl pk-yrs)     Types: Cigarettes, Electronic Cigarette     Start date:      Quit date:      Years since quittin.4     Passive exposure: Past    Smokeless tobacco: Never   Vaping Use    Vaping status: Never Used   Substance Use Topics    Alcohol use: Not Currently    Drug use: Never       Family History   Problem Relation Age of Onset    Arthritis Father     Colon cancer Father     Colon cancer Brother     Diabetes Brother         Health Maintenance Due   Topic Date Due    RSV Vaccine - Adults (1 - 1-dose 60+ series) Never done    COVID-19 Vaccine (2023- season) 2024    URINE MICROALBUMIN  2024        Current Outpatient Medications on File Prior to Visit   Medication Sig    aspirin 81 MG EC tablet Take 1 tablet by mouth Daily. Last dose 23 per pt as directed.    Blood Glucose Monitoring Suppl (FreeStyle Lite) device 1 each Daily.    JOHNSON PO Take  by mouth. Johnson gels at HS for the gout pain OTC    dapagliflozin Propanediol 10 MG tablet Take 10 mg by mouth Daily. Per Dr Luis    sacubitril-valsartan (ENTRESTO) 24-26 MG tablet Take 1 tablet by mouth 2 (Two) Times a Day.    vitamin D3 125 MCG (5000 UT) capsule capsule Take 2,000 Units by mouth Daily.    [DISCONTINUED] allopurinol (ZYLOPRIM) 100 MG tablet Take 1 tablet by mouth Daily.    [DISCONTINUED] atorvastatin (LIPITOR) 40 MG tablet Take 1 tablet by mouth Daily.    [DISCONTINUED] clopidogrel (PLAVIX) 75 MG tablet Take 1 tablet by mouth Daily.    [DISCONTINUED] Diclofenac Sodium (VOLTAREN) 1 % gel gel Apply 4 g topically to the appropriate area as directed 4 (Four) Times a Day As Needed (OA joint pain).    [DISCONTINUED] furosemide (LASIX) 40 MG tablet Take 1 tablet by mouth Daily.    [DISCONTINUED] glucose blood test strip 1 each by Other route Daily. Use as instructed    [DISCONTINUED] Lancets (freestyle) lancets 1 each by Other route Daily. Use as  instructed    [DISCONTINUED] levothyroxine (SYNTHROID, LEVOTHROID) 75 MCG tablet Take 1 tablet by mouth Every Morning.    [DISCONTINUED] metoprolol succinate XL (TOPROL-XL) 50 MG 24 hr tablet Take 1 tablet by mouth 2 (Two) Times a Day.    [DISCONTINUED] nitroglycerin (NITROSTAT) 0.4 MG SL tablet Take 1 tablet by mouth Every 5 (Five) Minutes As Needed for Chest Pain.    [DISCONTINUED] pantoprazole (PROTONIX) 40 MG EC tablet Take 1 tablet by mouth 2 (Two) Times a Day.    [DISCONTINUED] potassium chloride 10 MEQ CR tablet Take 1 tablet by mouth Daily.    [DISCONTINUED] pregabalin (Lyrica) 75 MG capsule Take 1 capsule by mouth Every Night.    [DISCONTINUED] traMADol (ULTRAM) 50 MG tablet Take 1 tablet by mouth Every 8 (Eight) Hours As Needed for Moderate Pain or Severe Pain.    [DISCONTINUED] hydroCHLOROthiazide (MICROZIDE) 12.5 MG capsule Take 1 capsule by mouth Daily.     No current facility-administered medications on file prior to visit.       Immunization History   Administered Date(s) Administered    COVID-19 (MODERNA) 1st,2nd,3rd Dose Monovalent 01/21/2021, 02/23/2021, 11/03/2021    COVID-19 (MODERNA) BIVALENT 12+YRS 10/06/2022    COVID-19 F23 (PFIZER) 12YRS+ (COMIRNATY) 12/04/2023    FLUAD TRI 65YR+ 10/26/2017    Fluzone High Dose =>65 Years ([employer] ONLY) 10/27/2014, 10/28/2015, 10/26/2016    Fluzone High-Dose 65+yrs 09/27/2021, 09/28/2022    Hepatitis A 10/10/2018, 09/08/2020    Influenza, Unspecified 10/10/2018, 09/30/2019    Pneumococcal Conjugate 20-Valent (PCV20) 12/08/2022       Review of Systems   Constitutional:  Negative for fatigue, unexpected weight gain and unexpected weight loss.   HENT:  Negative for trouble swallowing.    Eyes:  Positive for blurred vision.   Respiratory:  Negative for shortness of breath.    Cardiovascular:  Positive for leg swelling. Negative for chest pain and palpitations.   Gastrointestinal:  Positive for GERD. Negative for abdominal pain and blood in stool.   Endocrine:  "Negative for polydipsia and polyuria.   Genitourinary:  Negative for dysuria and vaginal bleeding.   Musculoskeletal:  Positive for arthralgias and gait problem.   Neurological:  Positive for tremors. Negative for dizziness, seizures, syncope, speech difficulty, light-headedness, memory problem and confusion.   Hematological:  Negative for adenopathy.   Psychiatric/Behavioral: Negative.  Negative for self-injury and suicidal ideas.         Objective     /74   Pulse 77   Temp 96.9 °F (36.1 °C) (Temporal)   Ht 152.4 cm (60\")   Wt 76.7 kg (169 lb)   SpO2 100%   BMI 33.01 kg/m²       Physical Exam  Vitals and nursing note reviewed.   Constitutional:       Appearance: Normal appearance.   HENT:      Head: Normocephalic.      Right Ear: External ear normal.      Left Ear: External ear normal.      Nose: Nose normal.      Mouth/Throat:      Mouth: Mucous membranes are moist.   Eyes:      Pupils: Pupils are equal, round, and reactive to light.   Cardiovascular:      Rate and Rhythm: Normal rate and regular rhythm.      Pulses:           Dorsalis pedis pulses are 2+ on the right side and 2+ on the left side.        Posterior tibial pulses are 2+ on the right side and 2+ on the left side.      Heart sounds: Normal heart sounds.   Pulmonary:      Effort: Pulmonary effort is normal.      Breath sounds: Normal breath sounds.   Abdominal:      Palpations: Abdomen is soft.   Musculoskeletal:      Cervical back: Neck supple.      Right foot: No deformity or bunion.      Left foot: No deformity or bunion.        Feet:    Feet:      Right foot:      Protective Sensation: 10 sites tested.  4 sites sensed.      Skin integrity: Callus and dry skin present. No ulcer or blister.      Toenail Condition: Right toenails are normal.      Left foot:      Protective Sensation: 10 sites tested.  4 sites sensed.      Skin integrity: Callus and dry skin present. No ulcer or blister.      Toenail Condition: Left toenails are normal.     "  Comments: Diabetic Foot Exam Performed and Monofilament Test Performed     Skin:     General: Skin is warm and dry.   Neurological:      Mental Status: She is alert and oriented to person, place, and time.   Psychiatric:         Mood and Affect: Mood normal.         Behavior: Behavior normal.         Thought Content: Thought content normal.         Judgment: Judgment normal.         Result Review :     The following data was reviewed by: JHON Humphreys on 06/10/2024:                 REVIEWED DR AMANDA'S VISITS FROM 5/8/24 AND 4/3/24      Assessment and Plan      Diagnoses and all orders for this visit:    1. Type 2 diabetes mellitus with stage 3 chronic kidney disease, without long-term current use of insulin, unspecified whether stage 3a or 3b CKD (Primary)  -     Discontinue: pregabalin (Lyrica) 75 MG capsule; Take 1 capsule by mouth Every Night.  Dispense: 90 capsule; Refill: 0  -     Urinalysis With Culture If Indicated - Urine, Clean Catch  -     CBC & Differential  -     Comprehensive Metabolic Panel  -     Hemoglobin A1c  -     Lipid Panel  -     MicroAlbumin, Urine, Random - Urine, Clean Catch  -     TSH Rfx On Abnormal To Free T4  -     glucose blood test strip; 1 each by Other route Daily. Use as instructed  Dispense: 100 each; Refill: 4  -     Lancets (freestyle) lancets; 1 each by Other route Daily. Use as instructed  Dispense: 100 each; Refill: 3  -     pregabalin (Lyrica) 75 MG capsule; Take 1 capsule by mouth Every Night.  Dispense: 90 capsule; Refill: 0    2. Benign essential hypertension  -     levothyroxine (SYNTHROID, LEVOTHROID) 75 MCG tablet; Take 1 tablet by mouth Every Morning.  Dispense: 90 tablet; Refill: 1  -     metoprolol succinate XL (TOPROL-XL) 50 MG 24 hr tablet; Take 1 tablet by mouth 2 (Two) Times a Day.  Dispense: 180 tablet; Refill: 1  -     Urinalysis With Culture If Indicated - Urine, Clean Catch  -     CBC & Differential  -     Comprehensive Metabolic Panel  -      Lipid Panel  -     MicroAlbumin, Urine, Random - Urine, Clean Catch  -     TSH Rfx On Abnormal To Free T4    3. Congestive heart failure, unspecified HF chronicity, unspecified heart failure type  -     furosemide (LASIX) 40 MG tablet; Take 40 mg PO daily 4 days weekly then the 20 mg the other 3 days weekly  Dispense: 90 tablet; Refill: 1  -     potassium chloride 10 MEQ CR tablet; Take 1 tablet by mouth Daily.  Dispense: 90 tablet; Refill: 1  -     Comprehensive Metabolic Panel    4. Cardiomyopathy, unspecified type  -     clopidogrel (PLAVIX) 75 MG tablet; Take 1 tablet by mouth Daily.  Dispense: 90 tablet; Refill: 1  -     furosemide (LASIX) 40 MG tablet; Take 40 mg PO daily 4 days weekly then the 20 mg the other 3 days weekly  Dispense: 90 tablet; Refill: 1  -     metoprolol succinate XL (TOPROL-XL) 50 MG 24 hr tablet; Take 1 tablet by mouth 2 (Two) Times a Day.  Dispense: 180 tablet; Refill: 1  -     nitroglycerin (NITROSTAT) 0.4 MG SL tablet; Take 1 tablet by mouth Every 5 (Five) Minutes As Needed for Chest Pain.  Dispense: 25 tablet; Refill: 1  -     potassium chloride 10 MEQ CR tablet; Take 1 tablet by mouth Daily.  Dispense: 90 tablet; Refill: 1  -     Comprehensive Metabolic Panel    5. Mixed hyperlipidemia  -     atorvastatin (LIPITOR) 40 MG tablet; Take 1 tablet by mouth Daily.  Dispense: 90 tablet; Refill: 1  -     Comprehensive Metabolic Panel  -     Lipid Panel    6. Hypothyroidism, unspecified type  -     levothyroxine (SYNTHROID, LEVOTHROID) 75 MCG tablet; Take 1 tablet by mouth Every Morning.  Dispense: 90 tablet; Refill: 1  -     TSH Rfx On Abnormal To Free T4    7. Gastroesophageal reflux disease without esophagitis  -     Comprehensive Metabolic Panel  -     pantoprazole (PROTONIX) 40 MG EC tablet; Take 1 tablet by mouth Daily.  Dispense: 90 tablet; Refill: 1    8. Stage 3b chronic kidney disease  -     furosemide (LASIX) 40 MG tablet; Take 40 mg PO daily 4 days weekly then the 20 mg the other 3  days weekly  Dispense: 90 tablet; Refill: 1  -     potassium chloride 10 MEQ CR tablet; Take 1 tablet by mouth Daily.  Dispense: 90 tablet; Refill: 1  -     Comprehensive Metabolic Panel    9. Chronic gout without tophus, unspecified cause, unspecified site  -     allopurinol (ZYLOPRIM) 100 MG tablet; Take 1 tablet by mouth Daily.  Dispense: 90 tablet; Refill: 1  -     Comprehensive Metabolic Panel  -     Uric Acid    10. Polyarthropathy  -     Diclofenac Sodium (VOLTAREN) 1 % gel gel; Apply 4 g topically to the appropriate area as directed 4 (Four) Times a Day As Needed (OA joint pain).  Dispense: 350 g; Refill: 1  -     Discontinue: traMADol (ULTRAM) 50 MG tablet; Take 1 tablet by mouth Every 8 (Eight) Hours As Needed for Moderate Pain or Severe Pain.  Dispense: 270 tablet; Refill: 0  -     Comprehensive Metabolic Panel  -     traMADol (ULTRAM) 50 MG tablet; Take 1 tablet by mouth Every 8 (Eight) Hours As Needed for Moderate Pain or Severe Pain.  Dispense: 270 tablet; Refill: 0    11. Peripheral nerve disease  -     Discontinue: pregabalin (Lyrica) 75 MG capsule; Take 1 capsule by mouth Every Night.  Dispense: 90 capsule; Refill: 0  -     Discontinue: traMADol (ULTRAM) 50 MG tablet; Take 1 tablet by mouth Every 8 (Eight) Hours As Needed for Moderate Pain or Severe Pain.  Dispense: 270 tablet; Refill: 0  -     Comprehensive Metabolic Panel  -     traMADol (ULTRAM) 50 MG tablet; Take 1 tablet by mouth Every 8 (Eight) Hours As Needed for Moderate Pain or Severe Pain.  Dispense: 270 tablet; Refill: 0  -     pregabalin (Lyrica) 75 MG capsule; Take 1 capsule by mouth Every Night.  Dispense: 90 capsule; Refill: 0    12. Primary osteoarthritis of right knee  -     Discontinue: traMADol (ULTRAM) 50 MG tablet; Take 1 tablet by mouth Every 8 (Eight) Hours As Needed for Moderate Pain or Severe Pain.  Dispense: 270 tablet; Refill: 0  -     Comprehensive Metabolic Panel  -     traMADol (ULTRAM) 50 MG tablet; Take 1 tablet by  mouth Every 8 (Eight) Hours As Needed for Moderate Pain or Severe Pain.  Dispense: 270 tablet; Refill: 0    13. Comprehensive diabetic foot examination, type 2 DM, encounter for    Medication refills as noted above and we will need to FAX ALL LABS TO DR AMANDA         Follow Up     Return in about 3 months (around 9/10/2024), or if symptoms worsen or fail to improve, for Recheck.    Patient was given instructions and counseling regarding her condition or for health maintenance advice. Please see specific information pulled into the AVS if appropriate.            Nicole Holliday  reports that she quit smoking about 22 years ago. Her smoking use included cigarettes and electronic cigarette. She started smoking about 27 years ago. She has a 15 pack-year smoking history. She has been exposed to tobacco smoke. She has never used smokeless tobacco. I have educated her on the risk of diseases from using tobacco products such as cancer, COPD, and heart disease.

## 2024-06-10 NOTE — PROGRESS NOTES
Please fax all labs to cardiology Dr. Luis and please mail letter to patient stating    Effie glucose shows trace protein and some glucose spillage and moderate leukocytes and microscopically no bacteria was seen in the cholesterol panel shows triglycerides elevated at 184 and should be 150 or less when fasting and the HDL was down to 38 and should be 40 or greater and the LDL was normal and hemoglobin A1c was in terrific range at 5.8% showing very good diabetic control and blood counts normal range and the yearly urine microalbumin normal range and uric acid level and thyroid levels normal range    And then the comprehensive panel shows normal glucose and normal electrolytes and normal liver function test but the kidney function test are consistent with some dehydration as well as chronic kidney disease stage IV--and I know you told me that Dr. Luis had been making some adjustments on your diuretics-but normally when I have patients in chronic kidney disease stage IV they are established with nephrology please let me know if you and Stacey are amendable to my referring you for further evaluation to nephrology regarding the chronic kidney disease stage IV

## 2024-06-10 NOTE — PROGRESS NOTES
Venipuncture Blood Specimen Collection  Venipuncture performed in left arm  by Lupe White with good hemostasis. Patient tolerated the procedure well without complications.   06/10/24   Lupe White

## 2024-06-12 LAB — BACTERIA SPEC AEROBE CULT: NORMAL

## 2024-06-12 NOTE — PROGRESS NOTES
Please mail letter to patient stating    Effie the urine culture shows normal urogenital kendra and no antibiotics are indicated

## 2024-09-11 ENCOUNTER — OFFICE VISIT (OUTPATIENT)
Dept: FAMILY MEDICINE CLINIC | Facility: CLINIC | Age: 84
End: 2024-09-11
Payer: MEDICARE

## 2024-09-11 VITALS
OXYGEN SATURATION: 93 % | WEIGHT: 172 LBS | DIASTOLIC BLOOD PRESSURE: 68 MMHG | SYSTOLIC BLOOD PRESSURE: 108 MMHG | HEART RATE: 66 BPM | BODY MASS INDEX: 33.59 KG/M2

## 2024-09-11 DIAGNOSIS — N18.30 TYPE 2 DIABETES MELLITUS WITH STAGE 3 CHRONIC KIDNEY DISEASE, WITHOUT LONG-TERM CURRENT USE OF INSULIN, UNSPECIFIED WHETHER STAGE 3A OR 3B CKD: Primary | ICD-10-CM

## 2024-09-11 DIAGNOSIS — K21.9 GASTROESOPHAGEAL REFLUX DISEASE WITHOUT ESOPHAGITIS: ICD-10-CM

## 2024-09-11 DIAGNOSIS — G62.9 PERIPHERAL NERVE DISEASE: ICD-10-CM

## 2024-09-11 DIAGNOSIS — E11.22 TYPE 2 DIABETES MELLITUS WITH STAGE 3 CHRONIC KIDNEY DISEASE, WITHOUT LONG-TERM CURRENT USE OF INSULIN, UNSPECIFIED WHETHER STAGE 3A OR 3B CKD: Primary | ICD-10-CM

## 2024-09-11 DIAGNOSIS — I25.10 ARTERIOSCLEROSIS OF CORONARY ARTERY: ICD-10-CM

## 2024-09-11 DIAGNOSIS — I50.9 CONGESTIVE HEART FAILURE, UNSPECIFIED HF CHRONICITY, UNSPECIFIED HEART FAILURE TYPE: ICD-10-CM

## 2024-09-11 DIAGNOSIS — N18.4 STAGE 4 CHRONIC KIDNEY DISEASE: ICD-10-CM

## 2024-09-11 DIAGNOSIS — M13.0 POLYARTHROPATHY: ICD-10-CM

## 2024-09-11 DIAGNOSIS — M17.11 PRIMARY OSTEOARTHRITIS OF RIGHT KNEE: ICD-10-CM

## 2024-09-11 DIAGNOSIS — Z86.79 HISTORY OF CHRONIC CHF: ICD-10-CM

## 2024-09-11 DIAGNOSIS — R79.89 ELEVATED BRAIN NATRIURETIC PEPTIDE (BNP) LEVEL: ICD-10-CM

## 2024-09-11 LAB
ANION GAP SERPL CALCULATED.3IONS-SCNC: 11 MMOL/L (ref 5–15)
BUN SERPL-MCNC: 38 MG/DL (ref 8–23)
BUN/CREAT SERPL: 20.2 (ref 7–25)
CALCIUM SPEC-SCNC: 10.8 MG/DL (ref 8.6–10.5)
CHLORIDE SERPL-SCNC: 104 MMOL/L (ref 98–107)
CO2 SERPL-SCNC: 25 MMOL/L (ref 22–29)
CREAT SERPL-MCNC: 1.88 MG/DL (ref 0.57–1)
EGFRCR SERPLBLD CKD-EPI 2021: 26.6 ML/MIN/1.73
GLUCOSE SERPL-MCNC: 88 MG/DL (ref 65–99)
NT-PROBNP SERPL-MCNC: 2523 PG/ML (ref 0–1800)
POTASSIUM SERPL-SCNC: 5 MMOL/L (ref 3.5–5.2)
SODIUM SERPL-SCNC: 140 MMOL/L (ref 136–145)
VIT B12 BLD-MCNC: 553 PG/ML (ref 211–946)

## 2024-09-11 PROCEDURE — 80048 BASIC METABOLIC PNL TOTAL CA: CPT | Performed by: NURSE PRACTITIONER

## 2024-09-11 PROCEDURE — 1125F AMNT PAIN NOTED PAIN PRSNT: CPT | Performed by: NURSE PRACTITIONER

## 2024-09-11 PROCEDURE — 99214 OFFICE O/P EST MOD 30 MIN: CPT | Performed by: NURSE PRACTITIONER

## 2024-09-11 PROCEDURE — 1160F RVW MEDS BY RX/DR IN RCRD: CPT | Performed by: NURSE PRACTITIONER

## 2024-09-11 PROCEDURE — 36415 COLL VENOUS BLD VENIPUNCTURE: CPT | Performed by: NURSE PRACTITIONER

## 2024-09-11 PROCEDURE — 3078F DIAST BP <80 MM HG: CPT | Performed by: NURSE PRACTITIONER

## 2024-09-11 PROCEDURE — 3074F SYST BP LT 130 MM HG: CPT | Performed by: NURSE PRACTITIONER

## 2024-09-11 PROCEDURE — 82607 VITAMIN B-12: CPT | Performed by: NURSE PRACTITIONER

## 2024-09-11 PROCEDURE — 1159F MED LIST DOCD IN RCRD: CPT | Performed by: NURSE PRACTITIONER

## 2024-09-11 PROCEDURE — 83880 ASSAY OF NATRIURETIC PEPTIDE: CPT | Performed by: NURSE PRACTITIONER

## 2024-09-11 RX ORDER — FAMOTIDINE 40 MG/1
40 TABLET, FILM COATED ORAL NIGHTLY
Qty: 90 TABLET | Refills: 0 | Status: SHIPPED | OUTPATIENT
Start: 2024-09-11

## 2024-09-11 RX ORDER — TRAMADOL HYDROCHLORIDE 50 MG/1
50 TABLET ORAL EVERY 8 HOURS PRN
Qty: 270 TABLET | Refills: 0 | Status: SHIPPED | OUTPATIENT
Start: 2024-09-11

## 2024-09-11 RX ORDER — PREGABALIN 75 MG/1
75 CAPSULE ORAL NIGHTLY
Qty: 90 CAPSULE | Refills: 0 | Status: SHIPPED | OUTPATIENT
Start: 2024-09-11

## 2024-09-11 NOTE — PROGRESS NOTES
Venipuncture Blood Specimen Collection  Venipuncture performed in left arm by Lupe White with good hemostasis. Patient tolerated the procedure well without complications.   09/11/24   Lupe White

## 2024-09-11 NOTE — PROGRESS NOTES
Answers submitted by the patient for this visit:  Primary Reason for Visit (Submitted on 9/4/2024)  What is the primary reason for your visit?: Diabetes  Diabetes Questionnaire (Submitted on 9/4/2024)  Chief Complaint: Diabetes problem  Below 70: occasionally    Chief Complaint  Peripheral Neuropathy (Refill meds )    Subjective            Ada FÁTIMA Holliday presents to [facility]  History of Present Illness  Patient is here today for her medication refill regarding the polyarthropathy and the peripheral neuropathy and patient is with established chronic kidney disease stage III and now appears to be stage IV and we are between cardiology and primary care monitoring levels very closely and currently the daughter reports that Dr. Luis her cardiologist has her alternating the Lasix 40 mg with the Lasix 20 mg every other day and seems to be working pretty well and the patient is attempting to work on hydration status    -- Polyarthropathy and peripheral neuropathy: Tolerating medications well with no side effects no issues reported does not show any signs or symptoms of misuse nor abuse and no aberrant behaviors patient follows up every 3 months and does have chronic kidney disease stage III as well as heart issues and is unable to take any nonsteroidal anti-inflammatories orally-and this does overall improve her quality of life and she is able to still live independently and do her own ADLs and her daughter checks in frequently on her on a daily basis and Raz report was appropriate and yearly urine tox screen was also appropriate    Also with the adjustments that cardiology has made with her Lasix does need a BNP rechecked for the congestive heart failure    And then regarding her reflux we attempted to cut her back from the Protonix 40 mg twice daily to once daily and the patient reports and her daughter reports she was absolutely miserable and had to go back to the twice daily so  they are amendable to her doing the Protonix 40 in the morning and famotidine 40 in the evening as I was attempting to assist by cutting this back to assist with her kidney function  Peripheral Neuropathy  This is a chronic problem. The problem occurs constantly. Associated symptoms include arthralgias. Pertinent negatives include no abdominal pain, chest pain, fatigue or headaches.   Diabetes  She has type 2 diabetes mellitus. MedicAlert identification noted. Hypoglycemia symptoms include tremors. Pertinent negatives for hypoglycemia include no confusion, dizziness, headaches, seizures, speech difficulty or sweats. Associated symptoms include blurred vision and foot paresthesias. Pertinent negatives for diabetes include no chest pain, no fatigue, no foot ulcerations, no polydipsia, no polyuria and no weight loss. Pertinent negatives for hypoglycemia complications include no blackouts, no hospitalization, no nocturnal hypoglycemia, no required assistance and no required glucagon injection. Symptoms are stable. Diabetic complications include peripheral neuropathy. Risk factors for coronary artery disease include diabetes mellitus, dyslipidemia, hypertension and post-menopausal. She is following a generally healthy diet. When asked about meal planning, she reported none. She never participates in exercise. She monitors blood glucose at home 1-2 x per day. Her highest blood glucose is 70-90 mg/dl. An ACE inhibitor/angiotensin II receptor blocker is being taken. She does not see a podiatrist. Eye exam is current.       PHQ-2 Total Score:    PHQ-9 Total Score:      Past Medical History:   Diagnosis Date    Allergic     Iodine    Anemia     Arthritis     Benign essential hypertension     Cancer     cervical    Cardiomyopathy     CHF (congestive heart failure) 2015    Cholelithiasis     Surgical removal    CKD (chronic kidney disease)     Coronary artery disease     Diabetes mellitus     GERD (gastroesophageal reflux  disease)     Gout     HLD (hyperlipidemia)     Hypothyroidism     Myocardial infarction     Osteopenia     Renal insufficiency        Allergies   Allergen Reactions    Contrast Dye (Echo Or Unknown Ct/Mr) Unknown - High Severity     Category: IV Contrast Dyes;       Penicillins Rash    Ibandronic Acid Nausea Only and Unknown - High Severity        Past Surgical History:   Procedure Laterality Date    BARIATRIC SURGERY      CARDIAC CATHETERIZATION      CHOLECYSTECTOMY      COLONOSCOPY      CORONARY ANGIOPLASTY WITH STENT PLACEMENT      CORONARY STENT PLACEMENT      FRACTURE SURGERY  23    Internal ORIF    GASTRIC BYPASS      HYSTERECTOMY      ORIF WRIST FRACTURE Right 2023    Procedure: OPEN REDUCTION INTERNAL FIXATION DISTAL RADIUS;  Surgeon: Symone Lawson MD;  Location: [facility] OR [facility];  Service: Orthopedics;  Laterality: Right;    VENTRICULAR CARDIAC PACEMAKER INSERTION      WRIST FRACTURE SURGERY          Social History     Tobacco Use    Smoking status: Former     Current packs/day: 0.00     Average packs/day: 0.9 packs/day for 17.5 years (15.0 ttl pk-yrs)     Types: Cigarettes, Electronic Cigarette     Start date:      Quit date:      Years since quittin.7     Passive exposure: Past    Smokeless tobacco: Never   Vaping Use    Vaping status: Never Used   Substance Use Topics    Alcohol use: Not Currently    Drug use: Never       Family History   Problem Relation Age of Onset    Arthritis Father     Colon cancer Father     Colon cancer Brother     Diabetes Brother         Health Maintenance Due   Topic Date Due    RSV Vaccine - Adults (1 - 1-dose 60+ series) Never done    COVID-19 Vaccine (2023- season) 2024    INFLUENZA VACCINE  2024    HEMOGLOBIN A1C  12/10/2024        Current Outpatient Medications on File Prior to Visit   Medication Sig    allopurinol (ZYLOPRIM) 100 MG tablet Take 1 tablet by mouth Daily.    aspirin 81 MG EC tablet Take 1 tablet by mouth Daily. Last  dose 07/16/23 per pt as directed.    atorvastatin (LIPITOR) 40 MG tablet Take 1 tablet by mouth Daily. (Patient taking differently: Take 2 tablets by mouth Daily.)    Blood Glucose Monitoring Suppl (FreeStyle Lite) device 1 each Daily.    JOHNSON PO Take  by mouth. Johnson gels at HS for the gout pain OTC    clopidogrel (PLAVIX) 75 MG tablet Take 1 tablet by mouth Daily.    dapagliflozin Propanediol 10 MG tablet Take 10 mg by mouth Daily. Per Dr Luis    Diclofenac Sodium (VOLTAREN) 1 % gel gel Apply 4 g topically to the appropriate area as directed 4 (Four) Times a Day As Needed (OA joint pain).    furosemide (LASIX) 40 MG tablet Take 40 mg PO daily 4 days weekly then the 20 mg the other 3 days weekly    glucose blood test strip 1 each by Other route Daily. Use as instructed    Lancets (freestyle) lancets 1 each by Other route Daily. Use as instructed    levothyroxine (SYNTHROID, LEVOTHROID) 75 MCG tablet Take 1 tablet by mouth Every Morning.    metoprolol succinate XL (TOPROL-XL) 50 MG 24 hr tablet Take 1 tablet by mouth 2 (Two) Times a Day.    nitroglycerin (NITROSTAT) 0.4 MG SL tablet Take 1 tablet by mouth Every 5 (Five) Minutes As Needed for Chest Pain.    pantoprazole (PROTONIX) 40 MG EC tablet Take 1 tablet by mouth Daily. (Patient taking differently: Take 1 tablet by mouth Daily. 1 tablet BID)    potassium chloride 10 MEQ CR tablet Take 1 tablet by mouth Daily.    sacubitril-valsartan (ENTRESTO) 24-26 MG tablet Take 1 tablet by mouth 2 (Two) Times a Day.    vitamin D3 125 MCG (5000 UT) capsule capsule Take 2,000 Units by mouth Daily.    [DISCONTINUED] pregabalin (Lyrica) 75 MG capsule Take 1 capsule by mouth Every Night.    [DISCONTINUED] traMADol (ULTRAM) 50 MG tablet Take 1 tablet by mouth Every 8 (Eight) Hours As Needed for Moderate Pain or Severe Pain.     No current facility-administered medications on file prior to visit.       Immunization History   Administered Date(s) Administered    COVID-19  (MODERNA) 1st,2nd,3rd Dose Monovalent 01/21/2021, 02/23/2021, 11/03/2021    COVID-19 (MODERNA) BIVALENT 12+YRS 10/06/2022    COVID-19 F23 (PFIZER) 12YRS+ (COMIRNATY) 12/04/2023    FLUAD TRI 65YR+ 10/26/2017    Fluzone High-Dose 65+YRS 10/27/2014, 10/28/2015, 10/26/2016    Fluzone High-Dose 65+yrs 09/27/2021, 09/28/2022, 09/24/2023    Hepatitis A 10/10/2018, 09/08/2020    Influenza, Unspecified 10/10/2018, 09/30/2019    Pneumococcal Conjugate 20-Valent (PCV20) 12/08/2022       Review of Systems   Constitutional:  Negative for fatigue and unexpected weight loss.   HENT:  Negative for trouble swallowing.    Eyes:  Positive for blurred vision.        Tremor is only if glucose gets low and then also F/U with eye specialists    Respiratory:  Positive for shortness of breath.         Still SOBOE at times    Cardiovascular:  Negative for chest pain and leg swelling.   Gastrointestinal:  Negative for abdominal pain, blood in stool and diarrhea.   Endocrine: Negative for polydipsia and polyuria.   Genitourinary:  Negative for dysuria and vaginal bleeding.   Musculoskeletal:  Positive for arthralgias and gait problem.        Shoulders and knees    Neurological:  Positive for tremors and light-headedness. Negative for dizziness, seizures, syncope, speech difficulty and confusion.        Tremor is only if glucose gets low --and then some lightheadedness if gets up too fast    Hematological:  Bruises/bleeds easily.   Psychiatric/Behavioral: Negative.  Negative for self-injury and suicidal ideas.      Objective     /68   Pulse 66   Wt 78 kg (172 lb)   SpO2 93%   BMI 33.59 kg/m²       Physical Exam  Vitals and nursing note reviewed. Exam conducted with a chaperone present (daughter).   Constitutional:       Appearance: Normal appearance.   HENT:      Head: Normocephalic.      Right Ear: External ear normal.      Left Ear: External ear normal.      Nose: Nose normal.      Mouth/Throat:      Mouth: Mucous membranes are  moist.   Eyes:      Pupils: Pupils are equal, round, and reactive to light.   Cardiovascular:      Rate and Rhythm: Normal rate and regular rhythm.      Heart sounds: Normal heart sounds.   Pulmonary:      Effort: Pulmonary effort is normal.      Breath sounds: Normal breath sounds.   Abdominal:      Palpations: Abdomen is soft.      Tenderness: There is no abdominal tenderness.   Musculoskeletal:         General: Swelling present. No signs of injury.      Cervical back: Normal range of motion and neck supple.      Comments:  Ambulates with the cane --multiple joints affected with the osteoarthritis shoulders hands knees-and patient even reports that I have no joint or bone it all hurts but she still able to ambulate well she is able to do her own ADLs and her daughter checks on her on a daily basis--and the right knee swelled but the left knee is the one that hurts     Skin:     General: Skin is warm and dry.   Neurological:      Mental Status: She is alert and oriented to person, place, and time.   Psychiatric:         Mood and Affect: Mood normal.         Behavior: Behavior normal.         Thought Content: Thought content normal.         Judgment: Judgment normal.         Result Review :     The following data was reviewed by: JHON Humphreys on 09/11/2024:                 ICD Remote - [employer] (02/07/2024)   Reviewed visit with cardiology Dr. Elian Luis 8/5/2024 and it was a telemedicine-and patient is due for follow-up again  Urine Drug Screen - Urine, Clean Catch (12/12/2023 08:28)      Assessment and Plan      Diagnoses and all orders for this visit:    1. Type 2 diabetes mellitus with stage 3 chronic kidney disease, without long-term current use of insulin, unspecified whether stage 3a or 3b CKD (Primary)  -     pregabalin (Lyrica) 75 MG capsule; Take 1 capsule by mouth Every Night.  Dispense: 90 capsule; Refill: 0    2. Stage 4 chronic kidney disease  -     Basic Metabolic Panel    3.  Polyarthropathy  -     traMADol (ULTRAM) 50 MG tablet; Take 1 tablet by mouth Every 8 (Eight) Hours As Needed for Moderate Pain or Severe Pain.  Dispense: 270 tablet; Refill: 0  -     Vitamin B12    4. Peripheral nerve disease  -     traMADol (ULTRAM) 50 MG tablet; Take 1 tablet by mouth Every 8 (Eight) Hours As Needed for Moderate Pain or Severe Pain.  Dispense: 270 tablet; Refill: 0  -     pregabalin (Lyrica) 75 MG capsule; Take 1 capsule by mouth Every Night.  Dispense: 90 capsule; Refill: 0    5. Primary osteoarthritis of right knee  -     traMADol (ULTRAM) 50 MG tablet; Take 1 tablet by mouth Every 8 (Eight) Hours As Needed for Moderate Pain or Severe Pain.  Dispense: 270 tablet; Refill: 0    6. Elevated brain natriuretic peptide (BNP) level  -     proBNP    7. History of chronic CHF  -     proBNP    8. Gastroesophageal reflux disease without esophagitis  -     famotidine (Pepcid) 40 MG tablet; Take 1 tablet by mouth Every Night.  Dispense: 90 tablet; Refill: 0    9. Arteriosclerosis of coronary artery  -     proBNP    10. Congestive heart failure, unspecified HF chronicity, unspecified heart failure type  -     proBNP    Medication refills as noted above and labs as noted above and instead we will add the famotidine 40 mg that she can take at night and then to utilize the Protonix for in the morning     and then we will fax all of the labs to Dr. Luis        Follow Up     Return in about 3 months (around 12/12/2024), or if symptoms worsen or fail to improve, for Recheck, Medicare Wellness, fasting labs and refills.    Patient was given instructions and counseling regarding her condition or for health maintenance advice. Please see specific information pulled into the AVS if appropriate.     BMI is >= 30 and <35. (Class 1 Obesity). The following options were offered after discussion;: nutrition counseling/recommendations      Nicole Holliday  reports that she quit smoking about 22 years ago. Her smoking use  included cigarettes and electronic cigarette. She started smoking about 27 years ago. She has a 15 pack-year smoking history. She has been exposed to tobacco smoke. She has never used smokeless tobacco. I have educated her on the risk of diseases from using tobacco products such as cancer, COPD, and heart disease.

## 2024-09-12 NOTE — PROGRESS NOTES
Please fax these labs to Dr. Luis her cardiologist and please mail letter to patient stating    Effie the basic metabolic panel does show a little bit of dehydration but not as bad as it has been in the past and the EGFR does reflect chronic kidney disease stage IV about the same as it was last time and glucose was normal and all of the electrolytes were normal with the exception of that slightly elevated calcium at 10.8 and I know we worked that up in the past and we can do further testing when you follow-up at the 3-month tiffanie in December    And then your BNP checking for your congestive heart failure levels were lower than they were 5 to 6 months ago at 2523 and last time you are at 3530 and we will fax these labs to Dr. Luis    And the vitamin B12 was in normal range at 553 and 4 years ago it was 990 and 5 years ago it was greater than 2000 so you may benefit from starting an over-the-counter vitamin B12 1000 mcg daily

## 2024-12-18 ENCOUNTER — OFFICE VISIT (OUTPATIENT)
Dept: FAMILY MEDICINE CLINIC | Facility: CLINIC | Age: 84
End: 2024-12-18
Payer: MEDICARE

## 2024-12-18 VITALS
SYSTOLIC BLOOD PRESSURE: 104 MMHG | DIASTOLIC BLOOD PRESSURE: 55 MMHG | HEIGHT: 60 IN | WEIGHT: 172 LBS | OXYGEN SATURATION: 98 % | HEART RATE: 63 BPM | BODY MASS INDEX: 33.77 KG/M2

## 2024-12-18 DIAGNOSIS — Z00.00 MEDICARE ANNUAL WELLNESS VISIT, SUBSEQUENT: Primary | ICD-10-CM

## 2024-12-18 DIAGNOSIS — N18.30 TYPE 2 DIABETES MELLITUS WITH STAGE 3 CHRONIC KIDNEY DISEASE, WITHOUT LONG-TERM CURRENT USE OF INSULIN, UNSPECIFIED WHETHER STAGE 3A OR 3B CKD: ICD-10-CM

## 2024-12-18 DIAGNOSIS — E78.2 MIXED HYPERLIPIDEMIA: ICD-10-CM

## 2024-12-18 DIAGNOSIS — Z23 COVID-19 VACCINE ADMINISTERED: ICD-10-CM

## 2024-12-18 DIAGNOSIS — K21.9 GASTROESOPHAGEAL REFLUX DISEASE WITHOUT ESOPHAGITIS: ICD-10-CM

## 2024-12-18 DIAGNOSIS — M1A.9XX0 CHRONIC GOUT WITHOUT TOPHUS, UNSPECIFIED CAUSE, UNSPECIFIED SITE: ICD-10-CM

## 2024-12-18 DIAGNOSIS — E03.9 HYPOTHYROIDISM, UNSPECIFIED TYPE: ICD-10-CM

## 2024-12-18 DIAGNOSIS — E11.22 TYPE 2 DIABETES MELLITUS WITH STAGE 3 CHRONIC KIDNEY DISEASE, WITHOUT LONG-TERM CURRENT USE OF INSULIN, UNSPECIFIED WHETHER STAGE 3A OR 3B CKD: ICD-10-CM

## 2024-12-18 DIAGNOSIS — E11.9 COMPREHENSIVE DIABETIC FOOT EXAMINATION, TYPE 2 DM, ENCOUNTER FOR: ICD-10-CM

## 2024-12-18 DIAGNOSIS — I42.9 CARDIOMYOPATHY, UNSPECIFIED TYPE: ICD-10-CM

## 2024-12-18 DIAGNOSIS — N18.32 STAGE 3B CHRONIC KIDNEY DISEASE: ICD-10-CM

## 2024-12-18 DIAGNOSIS — Z79.899 HIGH RISK MEDICATION USE: ICD-10-CM

## 2024-12-18 DIAGNOSIS — I10 BENIGN ESSENTIAL HYPERTENSION: ICD-10-CM

## 2024-12-18 DIAGNOSIS — M17.11 PRIMARY OSTEOARTHRITIS OF RIGHT KNEE: ICD-10-CM

## 2024-12-18 DIAGNOSIS — M13.0 POLYARTHROPATHY: ICD-10-CM

## 2024-12-18 DIAGNOSIS — G62.9 PERIPHERAL NERVE DISEASE: ICD-10-CM

## 2024-12-18 DIAGNOSIS — I50.9 CONGESTIVE HEART FAILURE, UNSPECIFIED HF CHRONICITY, UNSPECIFIED HEART FAILURE TYPE: ICD-10-CM

## 2024-12-18 DIAGNOSIS — Z79.899 CONTROLLED SUBSTANCE AGREEMENT SIGNED: ICD-10-CM

## 2024-12-18 LAB
ALBUMIN SERPL-MCNC: 4 G/DL (ref 3.5–5.2)
ALBUMIN/GLOB SERPL: 1.4 G/DL
ALP SERPL-CCNC: 133 U/L (ref 39–117)
ALT SERPL W P-5'-P-CCNC: 15 U/L (ref 1–33)
AMPHET+METHAMPHET UR QL: NEGATIVE
AMPHETAMINES UR QL: NEGATIVE
ANION GAP SERPL CALCULATED.3IONS-SCNC: 7 MMOL/L (ref 5–15)
AST SERPL-CCNC: 28 U/L (ref 1–32)
BACTERIA UR QL AUTO: ABNORMAL /HPF
BARBITURATES UR QL SCN: NEGATIVE
BASOPHILS # BLD AUTO: 0.03 10*3/MM3 (ref 0–0.2)
BASOPHILS NFR BLD AUTO: 0.4 % (ref 0–1.5)
BENZODIAZ UR QL SCN: NEGATIVE
BILIRUB SERPL-MCNC: 0.6 MG/DL (ref 0–1.2)
BILIRUB UR QL STRIP: NEGATIVE
BUN SERPL-MCNC: 30 MG/DL (ref 8–23)
BUN/CREAT SERPL: 17.2 (ref 7–25)
BUPRENORPHINE SERPL-MCNC: NEGATIVE NG/ML
CALCIUM SPEC-SCNC: 10.3 MG/DL (ref 8.6–10.5)
CANNABINOIDS SERPL QL: NEGATIVE
CHLORIDE SERPL-SCNC: 108 MMOL/L (ref 98–107)
CHOLEST SERPL-MCNC: 116 MG/DL (ref 0–200)
CLARITY UR: CLEAR
CO2 SERPL-SCNC: 26 MMOL/L (ref 22–29)
COCAINE UR QL: NEGATIVE
COLOR UR: ABNORMAL
CREAT SERPL-MCNC: 1.74 MG/DL (ref 0.57–1)
DEPRECATED RDW RBC AUTO: 48.3 FL (ref 37–54)
EGFRCR SERPLBLD CKD-EPI 2021: 29 ML/MIN/1.73
EOSINOPHIL # BLD AUTO: 0.23 10*3/MM3 (ref 0–0.4)
EOSINOPHIL NFR BLD AUTO: 3 % (ref 0.3–6.2)
ERYTHROCYTE [DISTWIDTH] IN BLOOD BY AUTOMATED COUNT: 12.9 % (ref 12.3–15.4)
FENTANYL UR-MCNC: NEGATIVE NG/ML
GLOBULIN UR ELPH-MCNC: 2.9 GM/DL
GLUCOSE SERPL-MCNC: 94 MG/DL (ref 65–99)
GLUCOSE UR STRIP-MCNC: ABNORMAL MG/DL
HBA1C MFR BLD: 5.5 % (ref 4.8–5.6)
HCT VFR BLD AUTO: 39.5 % (ref 34–46.6)
HDLC SERPL-MCNC: 42 MG/DL (ref 40–60)
HGB BLD-MCNC: 13.3 G/DL (ref 12–15.9)
HGB UR QL STRIP.AUTO: NEGATIVE
HOLD SPECIMEN: NORMAL
HYALINE CASTS UR QL AUTO: ABNORMAL /LPF
IMM GRANULOCYTES # BLD AUTO: 0.02 10*3/MM3 (ref 0–0.05)
IMM GRANULOCYTES NFR BLD AUTO: 0.3 % (ref 0–0.5)
KETONES UR QL STRIP: NEGATIVE
LDLC SERPL CALC-MCNC: 51 MG/DL (ref 0–100)
LDLC/HDLC SERPL: 1.13 {RATIO}
LEUKOCYTE ESTERASE UR QL STRIP.AUTO: ABNORMAL
LYMPHOCYTES # BLD AUTO: 1.81 10*3/MM3 (ref 0.7–3.1)
LYMPHOCYTES NFR BLD AUTO: 23.3 % (ref 19.6–45.3)
MCH RBC QN AUTO: 34 PG (ref 26.6–33)
MCHC RBC AUTO-ENTMCNC: 33.7 G/DL (ref 31.5–35.7)
MCV RBC AUTO: 101 FL (ref 79–97)
METHADONE UR QL SCN: NEGATIVE
MONOCYTES # BLD AUTO: 0.58 10*3/MM3 (ref 0.1–0.9)
MONOCYTES NFR BLD AUTO: 7.5 % (ref 5–12)
NEUTROPHILS NFR BLD AUTO: 5.1 10*3/MM3 (ref 1.7–7)
NEUTROPHILS NFR BLD AUTO: 65.5 % (ref 42.7–76)
NITRITE UR QL STRIP: NEGATIVE
NRBC BLD AUTO-RTO: 0 /100 WBC (ref 0–0.2)
OPIATES UR QL: NEGATIVE
OXYCODONE UR QL SCN: NEGATIVE
PCP UR QL SCN: NEGATIVE
PH UR STRIP.AUTO: 5.5 [PH] (ref 5–8)
PLATELET # BLD AUTO: 177 10*3/MM3 (ref 140–450)
PMV BLD AUTO: 12.7 FL (ref 6–12)
POTASSIUM SERPL-SCNC: 4.5 MMOL/L (ref 3.5–5.2)
PROT SERPL-MCNC: 6.9 G/DL (ref 6–8.5)
PROT UR QL STRIP: ABNORMAL
RBC # BLD AUTO: 3.91 10*6/MM3 (ref 3.77–5.28)
RBC # UR STRIP: ABNORMAL /HPF
REF LAB TEST METHOD: ABNORMAL
SODIUM SERPL-SCNC: 141 MMOL/L (ref 136–145)
SP GR UR STRIP: 1.03 (ref 1–1.03)
SQUAMOUS #/AREA URNS HPF: ABNORMAL /HPF
TRICYCLICS UR QL SCN: NEGATIVE
TRIGL SERPL-MCNC: 133 MG/DL (ref 0–150)
TSH SERPL DL<=0.05 MIU/L-ACNC: 1.79 UIU/ML (ref 0.27–4.2)
UROBILINOGEN UR QL STRIP: ABNORMAL
VLDLC SERPL-MCNC: 23 MG/DL (ref 5–40)
WBC # UR STRIP: ABNORMAL /HPF
WBC NRBC COR # BLD AUTO: 7.77 10*3/MM3 (ref 3.4–10.8)

## 2024-12-18 PROCEDURE — 80061 LIPID PANEL: CPT | Performed by: NURSE PRACTITIONER

## 2024-12-18 PROCEDURE — 3078F DIAST BP <80 MM HG: CPT | Performed by: NURSE PRACTITIONER

## 2024-12-18 PROCEDURE — 3074F SYST BP LT 130 MM HG: CPT | Performed by: NURSE PRACTITIONER

## 2024-12-18 PROCEDURE — 81001 URINALYSIS AUTO W/SCOPE: CPT | Performed by: NURSE PRACTITIONER

## 2024-12-18 PROCEDURE — 1126F AMNT PAIN NOTED NONE PRSNT: CPT | Performed by: NURSE PRACTITIONER

## 2024-12-18 PROCEDURE — 1159F MED LIST DOCD IN RCRD: CPT | Performed by: NURSE PRACTITIONER

## 2024-12-18 PROCEDURE — 85025 COMPLETE CBC W/AUTO DIFF WBC: CPT | Performed by: NURSE PRACTITIONER

## 2024-12-18 PROCEDURE — 1160F RVW MEDS BY RX/DR IN RCRD: CPT | Performed by: NURSE PRACTITIONER

## 2024-12-18 PROCEDURE — 83036 HEMOGLOBIN GLYCOSYLATED A1C: CPT | Performed by: NURSE PRACTITIONER

## 2024-12-18 PROCEDURE — 80307 DRUG TEST PRSMV CHEM ANLYZR: CPT | Performed by: NURSE PRACTITIONER

## 2024-12-18 PROCEDURE — 80053 COMPREHEN METABOLIC PANEL: CPT | Performed by: NURSE PRACTITIONER

## 2024-12-18 PROCEDURE — 99214 OFFICE O/P EST MOD 30 MIN: CPT | Performed by: NURSE PRACTITIONER

## 2024-12-18 PROCEDURE — G0439 PPPS, SUBSEQ VISIT: HCPCS | Performed by: NURSE PRACTITIONER

## 2024-12-18 PROCEDURE — 1170F FXNL STATUS ASSESSED: CPT | Performed by: NURSE PRACTITIONER

## 2024-12-18 PROCEDURE — 87086 URINE CULTURE/COLONY COUNT: CPT | Performed by: NURSE PRACTITIONER

## 2024-12-18 PROCEDURE — 84443 ASSAY THYROID STIM HORMONE: CPT | Performed by: NURSE PRACTITIONER

## 2024-12-18 RX ORDER — ASPIRIN 81 MG/1
81 TABLET ORAL DAILY
Status: CANCELLED | OUTPATIENT
Start: 2024-12-18

## 2024-12-18 RX ORDER — PREGABALIN 75 MG/1
75 CAPSULE ORAL NIGHTLY
Qty: 90 CAPSULE | Refills: 2 | Status: SHIPPED | OUTPATIENT
Start: 2024-12-18

## 2024-12-18 RX ORDER — PANTOPRAZOLE SODIUM 40 MG/1
40 TABLET, DELAYED RELEASE ORAL DAILY
Qty: 90 TABLET | Refills: 1 | Status: SHIPPED | OUTPATIENT
Start: 2024-12-18

## 2024-12-18 RX ORDER — ATORVASTATIN CALCIUM 40 MG/1
40 TABLET, FILM COATED ORAL DAILY
Qty: 90 TABLET | Refills: 1 | Status: SHIPPED | OUTPATIENT
Start: 2024-12-18

## 2024-12-18 RX ORDER — CLOPIDOGREL BISULFATE 75 MG/1
75 TABLET ORAL DAILY
Qty: 90 TABLET | Refills: 1 | Status: SHIPPED | OUTPATIENT
Start: 2024-12-18

## 2024-12-18 RX ORDER — POTASSIUM CHLORIDE 750 MG/1
10 TABLET, EXTENDED RELEASE ORAL DAILY
Qty: 90 TABLET | Refills: 1 | Status: SHIPPED | OUTPATIENT
Start: 2024-12-18

## 2024-12-18 RX ORDER — LEVOTHYROXINE SODIUM 75 UG/1
75 TABLET ORAL
Qty: 90 TABLET | Refills: 1 | Status: SHIPPED | OUTPATIENT
Start: 2024-12-18

## 2024-12-18 RX ORDER — METOPROLOL SUCCINATE 50 MG/1
50 TABLET, EXTENDED RELEASE ORAL 2 TIMES DAILY
Qty: 180 TABLET | Refills: 1 | Status: SHIPPED | OUTPATIENT
Start: 2024-12-18

## 2024-12-18 RX ORDER — ALLOPURINOL 100 MG/1
100 TABLET ORAL DAILY
Qty: 90 TABLET | Refills: 1 | Status: SHIPPED | OUTPATIENT
Start: 2024-12-18

## 2024-12-18 RX ORDER — CLOPIDOGREL BISULFATE 75 MG/1
75 TABLET ORAL DAILY
Qty: 90 TABLET | Refills: 1 | Status: CANCELLED | OUTPATIENT
Start: 2024-12-18

## 2024-12-18 RX ORDER — FAMOTIDINE 40 MG/1
40 TABLET, FILM COATED ORAL NIGHTLY
Qty: 90 TABLET | Refills: 1 | Status: SHIPPED | OUTPATIENT
Start: 2024-12-18

## 2024-12-18 RX ORDER — NITROGLYCERIN 0.4 MG/1
0.4 TABLET SUBLINGUAL
Qty: 25 TABLET | Refills: 1 | Status: SHIPPED | OUTPATIENT
Start: 2024-12-18

## 2024-12-18 RX ORDER — TRAMADOL HYDROCHLORIDE 50 MG/1
50 TABLET ORAL EVERY 8 HOURS PRN
Qty: 270 TABLET | Refills: 2 | Status: SHIPPED | OUTPATIENT
Start: 2024-12-18

## 2024-12-18 RX ORDER — DAPAGLIFLOZIN 10 MG/1
10 TABLET, FILM COATED ORAL DAILY
Qty: 30 TABLET | Status: CANCELLED | OUTPATIENT
Start: 2024-12-18

## 2024-12-18 RX ORDER — FUROSEMIDE 40 MG/1
TABLET ORAL
Qty: 90 TABLET | Refills: 1 | Status: SHIPPED | OUTPATIENT
Start: 2024-12-18

## 2024-12-18 NOTE — PROGRESS NOTES
Medicare annual wellness medication refills and fasting labs today  The ABCs of the Annual Wellness Visit  Medicare Wellness Visit      Nicole Holliday is a 84 y.o. patient who presents for a Medicare Wellness Visit.    The following portions of the patient's history were reviewed and   updated as appropriate: allergies, current medications, past family history, past medical history, past social history, past surgical history, and problem list.    Compared to one year ago, the patient's physical   health is the same.  Compared to one year ago, the patient's mental   health is the same.    Recent Hospitalizations:  She was not admitted to the hospital during the last year.     Current Medical Providers:  Patient Care Team:  Brenda Son APRN as PCP - General (Nurse Practitioner)  Kushal Haynes MD as Consulting Physician (Cardiology)  Elian Luis MD as Consulting Physician (Cardiology)  Jaziel Kumar MD as Consulting Physician (Ophthalmology)  Symone Lawson MD as Consulting Physician (Orthopedic Surgery)    Outpatient Medications Prior to Visit   Medication Sig Dispense Refill    aspirin 81 MG EC tablet Take 1 tablet by mouth Daily. Last dose 07/16/23 per pt as directed.      Blood Glucose Monitoring Suppl (FreeStyle Lite) device 1 each Daily. 1 each 0    CHERRY PO Take  by mouth. Johnson gels at HS for the gout pain OTC      dapagliflozin Propanediol 10 MG tablet Take 10 mg by mouth Daily. Per Dr Luis      glucose blood test strip 1 each by Other route Daily. Use as instructed 100 each 4    Lancets (freestyle) lancets 1 each by Other route Daily. Use as instructed 100 each 3    sacubitril-valsartan (ENTRESTO) 24-26 MG tablet Take 1 tablet by mouth 2 (Two) Times a Day.      vitamin D3 125 MCG (5000 UT) capsule capsule Take 2,000 Units by mouth Daily.      allopurinol (ZYLOPRIM) 100 MG tablet Take 1 tablet by mouth Daily. 90 tablet 1    atorvastatin (LIPITOR) 40 MG tablet Take 1 tablet  by mouth Daily. (Patient taking differently: Take 2 tablets by mouth Daily.) 90 tablet 1    clopidogrel (PLAVIX) 75 MG tablet Take 1 tablet by mouth Daily. 90 tablet 1    Diclofenac Sodium (VOLTAREN) 1 % gel gel Apply 4 g topically to the appropriate area as directed 4 (Four) Times a Day As Needed (OA joint pain). 350 g 1    famotidine (Pepcid) 40 MG tablet Take 1 tablet by mouth Every Night. 90 tablet 0    furosemide (LASIX) 40 MG tablet Take 40 mg PO daily 4 days weekly then the 20 mg the other 3 days weekly 90 tablet 1    levothyroxine (SYNTHROID, LEVOTHROID) 75 MCG tablet Take 1 tablet by mouth Every Morning. 90 tablet 1    metoprolol succinate XL (TOPROL-XL) 50 MG 24 hr tablet Take 1 tablet by mouth 2 (Two) Times a Day. 180 tablet 1    nitroglycerin (NITROSTAT) 0.4 MG SL tablet Take 1 tablet by mouth Every 5 (Five) Minutes As Needed for Chest Pain. 25 tablet 1    pantoprazole (PROTONIX) 40 MG EC tablet Take 1 tablet by mouth Daily. (Patient taking differently: Take 1 tablet by mouth Daily. 1 tablet BID) 90 tablet 1    potassium chloride 10 MEQ CR tablet Take 1 tablet by mouth Daily. 90 tablet 1    pregabalin (Lyrica) 75 MG capsule Take 1 capsule by mouth Every Night. 90 capsule 0    traMADol (ULTRAM) 50 MG tablet Take 1 tablet by mouth Every 8 (Eight) Hours As Needed for Moderate Pain or Severe Pain. 270 tablet 0     No facility-administered medications prior to visit.     Opioid medication/s are on active medication list.  and I have evaluated her active treatment plan and pain score trends (see table).  Vitals:    12/18/24 1109   PainSc: 0-No pain     I have reviewed the chart for potential of high risk medication and harmful drug interactions in the elderly.        Aspirin is on active medication list. Aspirin use is indicated based on review of current medical condition/s. Pros and cons of this therapy have been discussed today. Benefits of this medication outweigh potential harm.  Patient has been  "encouraged to continue taking this medication.  .      Patient Active Problem List   Diagnosis    Stage 3 chronic kidney disease    Polyarthropathy    Peripheral nerve disease    Hypothyroidism    Hyperlipidemia    Gastroesophageal reflux disease    Diabetes mellitus, type II    Cardiomyopathy    Benign essential hypertension    Automatic implantable cardiac defibrillator in situ    Arteriosclerosis of coronary artery    Allergic rhinitis    Gout    Atherosclerosis    Pain of knee joint with osteochondral injury    Primary osteoarthritis of right knee    Closed fracture of right distal radius    Distal radius fracture, right     Advance Care Planning Advance Directive is on file.  ACP discussion was held with the patient during this visit. Patient has an advance directive in EMR which is still valid.             Objective   Vitals:    24 1109   BP: 104/55   Pulse: 63   SpO2: 98%   Weight: 78 kg (172 lb)   Height: 152.4 cm (60\")   PainSc: 0-No pain       Estimated body mass index is 33.59 kg/m² as calculated from the following:    Height as of this encounter: 152.4 cm (60\").    Weight as of this encounter: 78 kg (172 lb).                Does the patient have evidence of cognitive impairment? No                                                                                                Health  Risk Assessment    Smoking Status:  Social History     Tobacco Use   Smoking Status Former    Current packs/day: 0.00    Average packs/day: 0.9 packs/day for 17.5 years (15.0 ttl pk-yrs)    Types: Cigarettes, Electronic Cigarette    Start date: 1997    Quit date:     Years since quittin.9    Passive exposure: Past   Smokeless Tobacco Never     Alcohol Consumption:  Social History     Substance and Sexual Activity   Alcohol Use Not Currently       Fall Risk Screen  STEADI Fall Risk Assessment was completed, and patient is at MODERATE risk for falls. Assessment completed on:2024    Depression Screening "   Little interest or pleasure in doing things? Not at all   Feeling down, depressed, or hopeless? Not at all   PHQ-2 Total Score 0      Health Habits and Functional and Cognitive Screenin/18/2024    11:06 AM   Functional & Cognitive Status   Do you have difficulty preparing food and eating? No   Do you have difficulty bathing yourself, getting dressed or grooming yourself? No   Do you have difficulty using the toilet? No   Do you have difficulty moving around from place to place? No   Do you have trouble with steps or getting out of a bed or a chair? No   Current Diet Other   Dental Exam Other   Eye Exam Up to date   Exercise (times per week) 0 times per week   Current Exercises Include No Regular Exercise   Do you need help using the phone?  No   Are you deaf or do you have serious difficulty hearing?  No   Do you need help to go to places out of walking distance? No   Do you need help shopping? No   Do you need help preparing meals?  No   Do you need help with housework?  Yes   Do you need help with laundry? No   Do you need help taking your medications? No   Do you need help managing money? No   Do you ever drive or ride in a car without wearing a seat belt? No   Have you felt unusual stress, anger or loneliness in the last month? No   Who do you live with? Alone   If you need help, do you have trouble finding someone available to you? No   Have you been bothered in the last four weeks by sexual problems? No   Do you have difficulty concentrating, remembering or making decisions? No           Age-appropriate Screening Schedule:  Refer to the list below for future screening recommendations based on patient's age, sex and/or medical conditions. Orders for these recommended tests are listed in the plan section. The patient has been provided with a written plan.    Health Maintenance List  Health Maintenance   Topic Date Due    RSV Vaccine - Adults (1 - 1-dose 75+ series) Never done    COVID-19 Vaccine (6 -  2024-25 season) 09/01/2024    HEMOGLOBIN A1C  12/10/2024    DXA SCAN  01/11/2025    ZOSTER VACCINE (1 of 2) 12/18/2024 (Originally 10/14/1992)    TDAP/TD VACCINES (1 - Tdap) 12/18/2025 (Originally 10/14/1959)    DIABETIC EYE EXAM  06/05/2025    LIPID PANEL  06/10/2025    BMI FOLLOWUP  09/11/2025    ANNUAL WELLNESS VISIT  12/18/2025    DIABETIC FOOT EXAM  12/18/2025    INFLUENZA VACCINE  Completed    Pneumococcal Vaccine 65+  Completed    URINE MICROALBUMIN  Discontinued                                                                                                                                                CMS Preventative Services Quick Reference  Risk Factors Identified During Encounter  Immunizations Discussed/Encouraged: COVID19 and RSV (Respiratory Syncytial Virus)    The above risks/problems have been discussed with the patient.  Pertinent information has been shared with the patient in the After Visit Summary.  An After Visit Summary and PPPS were made available to the patient.    Follow Up:   Next Medicare Wellness visit to be scheduled in 1 year.         Additional E&M Note during same encounter follows:  Patient has additional, significant, and separately identifiable condition(s)/problem(s) that require work above and beyond the Medicare Wellness Visit     Chief Complaint  Medicare Wellness-subsequent    Subjective    HPI  Effie is also being seen today for an annual adult preventative physical exam.  and Effie is also being seen today for additional medical problem/s.       The patient is an 84-year-old female who presents for evaluation of gastroesophageal reflux disease, congestive heart failure, chronic kidney disease, cardiomyopathy, diabetes mellitus, orthostatic hypotension, neuropathy, visual disturbances, and health maintenance. She is accompanied by her daughter.    She reports experiencing heartburn at night, which has not been adequately managed with her current medication regimen. She plans  to adjust the timing of her famotidine and pantoprazole doses, taking famotidine in the morning and pantoprazole at night.    She is on Lasix and potassium for her congestive heart failure. She did not take her diuretic today due to her clinic visit. She reports no epigastric or bladder pain. She experiences mild fatigue after prolonged activity and occasional shortness of breath but does not experience chest pain or require frequent use of Nitrostat. She reports normal swelling in her feet and ankles, which worsens with increased activity. She also reports easy bruising, likely due to her Plavix and baby aspirin regimen.    She reports no cognitive impairment, although she occasionally forgets things, which she attributes to her age. She reports no recent hospitalizations, with her last admission being in 07/2023. She reports no changes in her physical or mental health compared to the previous year. She reports no feelings of depression or suicidal ideation. She reports no difficulty swallowing, visual disturbances, frequent urination, increased hunger, dizziness, fainting, painful urination, or vaginal bleeding. She experiences cold feet at night when sitting, which she attributes to neuropathy. She has an advanced directive on file.    She reports increased thirst and keeps a jug of water nearby. She is on a diuretic and reports frequent urination.    She has been experiencing blurred vision and is in need of new glasses. She saw Dr. Kumar a few months ago but did not want to get new glasses at that time. She ordered them from [place] where they are cheaper, but the measurements were not right.    She reports no orthostatic symptoms currently but has experienced them in the past, which she believes were related to her blood pressure.    She has a history of arthritis and neuropathy-type pain and does have chronic kidney disease stage III unable to take nonsteroidal  "anti-inflammatories.    MEDICATIONS  Entresto, Farxiga, famotidine, pantoprazole, Lasix, potassium, metoprolol, Toprol-XL, Nitrostat, Lyrica, tramadol, Plavix, allopurinol, atorvastatin, Voltaren gel    IMMUNIZATIONS  She has received the pneumococcal 13 and 20 vaccines. She has received all available COVID-19 vaccines.  Review of Systems   Constitutional:  Positive for fatigue.   HENT:  Negative for trouble swallowing.    Eyes:  Positive for visual disturbance.        Sees DR Kumar and does need new glasses    Respiratory:  Positive for shortness of breath.    Cardiovascular:  Negative for chest pain.   Endocrine: Positive for polydipsia and polyuria. Negative for polyphagia.   Genitourinary:  Negative for dysuria and vaginal bleeding.   Musculoskeletal:  Positive for arthralgias.        And neuropathy pain    Neurological:  Positive for dizziness and light-headedness. Negative for syncope.   Hematological:  Bruises/bleeds easily.   Psychiatric/Behavioral:  Negative for self-injury and suicidal ideas.           Objective   Vital Signs:  /55   Pulse 63   Ht 152.4 cm (60\")   Wt 78 kg (172 lb)   SpO2 98%   BMI 33.59 kg/m²   Physical Exam  Vitals and nursing note reviewed. Exam conducted with a chaperone present (daughter, Yancy).   Constitutional:       Appearance: Normal appearance.   HENT:      Head: Normocephalic.      Right Ear: External ear normal.      Left Ear: External ear normal.      Nose: Nose normal.      Mouth/Throat:      Mouth: Mucous membranes are moist.   Eyes:      Pupils: Pupils are equal, round, and reactive to light.   Cardiovascular:      Rate and Rhythm: Normal rate and regular rhythm.      Pulses:           Dorsalis pedis pulses are 2+ on the right side and 2+ on the left side.        Posterior tibial pulses are 2+ on the right side and 2+ on the left side.      Heart sounds: Normal heart sounds.      Comments: Trace BLE   Pulmonary:      Effort: Pulmonary effort is normal.      " Breath sounds: Normal breath sounds.   Abdominal:      Palpations: Abdomen is soft.   Musculoskeletal:      Cervical back: Normal range of motion and neck supple.      Right lower leg: Edema present.      Left lower leg: Edema present.      Right foot: Bunion present.      Comments: Multiple joints with osteoarthritis and she does have peripheral neuropathy and ambulates with a cane   Feet:      Right foot:      Protective Sensation: 10 sites tested.  2 sites sensed.      Skin integrity: Skin integrity normal. No ulcer or blister.      Toenail Condition: Right toenails are normal.      Left foot:      Protective Sensation: 10 sites tested.   1 site sensed.     Skin integrity: Skin integrity normal. No ulcer or blister.      Toenail Condition: Left toenails are normal.      Comments: Diabetic Foot Exam Performed and Monofilament Test Performed     Skin:     General: Skin is warm and dry.   Neurological:      Mental Status: She is alert and oriented to person, place, and time.   Psychiatric:         Mood and Affect: Mood normal.         Behavior: Behavior normal.         Thought Content: Thought content normal.         Judgment: Judgment normal.           Lungs were auscultated.    The following data was reviewed by: JHON Humphreys on 12/18/2024:      Reviewed the telemedicine telehealth visit with Dr. Elian Luis 5/8/2024 and reviewed also the visit with Dr. Xiong the interventional cardiologist in 2/7/2024 as well  Results                Assessment and Plan Additional age appropriate preventative wellness advice topics were discussed during today's preventative wellness exam(some topics already addressed during AWV portion of the note above):    Physical Activity: Advised cardiovascular activity 150 minutes per week as tolerated. (example brisk walk for 30 minutes, 5 days a week).     Nutrition: Discussed nutrition plan with patient. Information shared in after visit summary. Goal is for a well  balanced diet to enhance overall health.     Healthy Weight: Discussed current and goal BMI with patient. Steps to attain this goal discussed. Information shared in after visit summary.       1. Gastroesophageal Reflux Disease (GERD).  She will switch the timing of her medications, taking famotidine in the morning and pantoprazole at night to better manage her symptoms.    2. Congestive Heart Failure.  With hypertension history  She is currently on Lasix and potassium for her congestive heart failure. She will continue her current regimen.  Continues on Entresto and she gets this through cardiology no side effects no issues reported    3. Chronic Kidney Disease.  She is on Lyrica for her chronic kidney disease and neuropathy.  This has been stable and she is in chronic kidney disease stage III and we monitor closely    4. Cardiomyopathy.  With hypertension history  Her blood pressure is kept on the lower side due to her cardiomyopathy.  Continues to see cardiology regularly    5. Diabetes Mellitus.  She reports feeling thirsty frequently and urinating often, which may be related to her diabetes and diuretic use.  And she is on Farxiga but this is prescribed for the congestive heart failure aspect and coronary artery disease aspect through cardiology tolerates well with no side effects no issues reported no hypoglycemic episodes reported    6. Orthostatic Hypotension.  She experiences dizziness when her blood pressure drops, indicating orthostatic hypotension.  Monitors blood pressure and changes position carefully and stays well-hydrated    7. Neuropathy.  She reports cold feet at night and reduced sensation in her toes, consistent with neuropathy.  Tolerating the Lyrica very well with no side effects no issues reported no SI/HI shows no aberrant behaviors no signs symptoms of misuse nor abuse Raz report was negative as she gets this filled through [facility] pharmacy and they do not report  through Raz and only takes medication as directed and follows up regularly we are updating the yearly urine tox screen today as well as the safe use of controlled substance agreement today    8. Health Maintenance.--Medicare annual wellness  She is due for her COVID-19 vaccine, which will be administered today if available. If not, she will return to the clinic for the vaccine. She is also due for her annual urine toxicology screen. A urine sample will be collected today. She will discuss the need for a bone density scan at her next visit in 3 months.    9. Visual disturbances.  She needs new glasses as her current ones are not fitting properly.    10.  Osteoarthritis with chronic kidney disease multiple joints-unable to take any nonsteroidal anti-inflammatories and is currently on tramadol tolerates this well with no side effects no issues reported no falls reported and no SI/HI and shows no aberrant behaviors no signs or symptoms of misuse nor abuse and Raz report is always negative because she gets this filled through the [facility] pharmacy and they do not report through Raz reporting-and only utilizes the medicine as directed and follows up regularly for this and overall improves her quality of life and she is able to still remain independent and family checks on her frequently and we are updating the yearly urine tox screen today as well as the safe use of controlled substance agreement today    11.  Hypothyroidism: Tolerating medication well with no side effects no issues reported no hair or nail skin issues reported overall stable    12.  Dyslipidemia: Tolerate medication well with no side effects no issues with statin therapy that she is currently on no myalgias reported overall stable        Follow-up  The patient will follow up in 3 months.          I spent 44 minutes caring for Ada on this date of service. This time includes time spent by me in the following activities:preparing for the  visit, reviewing tests, obtaining and/or reviewing a separately obtained history, performing a medically appropriate examination and/or evaluation , counseling and educating the patient/family/caregiver, ordering medications, tests, or procedures, documenting information in the medical record, and care coordination  Follow Up   Return in about 3 months (around 3/18/2025), or if symptoms worsen or fail to improve, for Recheck.  Patient was given instructions and counseling regarding her condition or for health maintenance advice. Please see specific information pulled into the AVS if appropriate.  Patient or patient representative verbalized consent for the use of Ambient Listening during the visit with  JHON Humphreys for chart documentation. 12/18/2024  11:29 EST

## 2024-12-19 LAB — BACTERIA SPEC AEROBE CULT: NORMAL

## 2024-12-19 NOTE — PROGRESS NOTES
Please mail letter to patient stating    Effie, Thus far the urine culture just shows normal urogenital kendra should anything change and require an antibiotic we would let you know

## 2024-12-19 NOTE — PROGRESS NOTES
Please mail letter to patient stating    Effie comprehensive panel still appears stable with chronic kidney disease stage IV and the eGFR actually improved from 26.63 months ago to 29 and the BUN and creatinine are slightly improved as well this time and then the alkaline phosphatase is just modestly elevated--and then the urinalysis shows 2+ glucose spillage trace protein and small amount leukocytes and microscopically there was no bacteria seen but they do have the culture pending and we will let you know if an antibiotic is indicated    And then your blood counts and your thyroid levels were in normal range and the cholesterol panel was all normal and then your hemoglobin A1c was in normal range at 5.5%    If you need any of these labs faxed anywhere to cardiology let me know and I will be glad to do that for you

## 2025-03-19 ENCOUNTER — OFFICE VISIT (OUTPATIENT)
Dept: FAMILY MEDICINE CLINIC | Facility: CLINIC | Age: 85
End: 2025-03-19
Payer: MEDICARE

## 2025-03-19 VITALS
TEMPERATURE: 96.9 F | DIASTOLIC BLOOD PRESSURE: 56 MMHG | HEART RATE: 86 BPM | WEIGHT: 169.4 LBS | OXYGEN SATURATION: 100 % | BODY MASS INDEX: 33.08 KG/M2 | SYSTOLIC BLOOD PRESSURE: 114 MMHG

## 2025-03-19 DIAGNOSIS — M13.0 POLYARTHROPATHY: ICD-10-CM

## 2025-03-19 DIAGNOSIS — M17.11 PRIMARY OSTEOARTHRITIS OF RIGHT KNEE: ICD-10-CM

## 2025-03-19 DIAGNOSIS — G62.9 PERIPHERAL NERVE DISEASE: ICD-10-CM

## 2025-03-19 DIAGNOSIS — H61.23 BILATERAL IMPACTED CERUMEN: ICD-10-CM

## 2025-03-19 DIAGNOSIS — E11.22 TYPE 2 DIABETES MELLITUS WITH STAGE 3 CHRONIC KIDNEY DISEASE, WITHOUT LONG-TERM CURRENT USE OF INSULIN, UNSPECIFIED WHETHER STAGE 3A OR 3B CKD: Primary | ICD-10-CM

## 2025-03-19 DIAGNOSIS — N18.30 TYPE 2 DIABETES MELLITUS WITH STAGE 3 CHRONIC KIDNEY DISEASE, WITHOUT LONG-TERM CURRENT USE OF INSULIN, UNSPECIFIED WHETHER STAGE 3A OR 3B CKD: Primary | ICD-10-CM

## 2025-03-19 DIAGNOSIS — N18.30 STAGE 3 CHRONIC KIDNEY DISEASE, UNSPECIFIED WHETHER STAGE 3A OR 3B CKD: ICD-10-CM

## 2025-03-19 LAB
ANION GAP SERPL CALCULATED.3IONS-SCNC: 9.7 MMOL/L (ref 5–15)
BUN SERPL-MCNC: 38 MG/DL (ref 8–23)
BUN/CREAT SERPL: 17.3 (ref 7–25)
CALCIUM SPEC-SCNC: 10.5 MG/DL (ref 8.6–10.5)
CHLORIDE SERPL-SCNC: 105 MMOL/L (ref 98–107)
CO2 SERPL-SCNC: 24.3 MMOL/L (ref 22–29)
CREAT SERPL-MCNC: 2.2 MG/DL (ref 0.57–1)
EGFRCR SERPLBLD CKD-EPI 2021: 21.9 ML/MIN/1.73
GLUCOSE SERPL-MCNC: 116 MG/DL (ref 65–99)
POTASSIUM SERPL-SCNC: 4.8 MMOL/L (ref 3.5–5.2)
SODIUM SERPL-SCNC: 139 MMOL/L (ref 136–145)

## 2025-03-19 PROCEDURE — 80048 BASIC METABOLIC PNL TOTAL CA: CPT | Performed by: NURSE PRACTITIONER

## 2025-03-19 RX ORDER — FLUTICASONE PROPIONATE 50 MCG
1 SPRAY, SUSPENSION (ML) NASAL DAILY
COMMUNITY

## 2025-03-19 RX ORDER — PREGABALIN 75 MG/1
75 CAPSULE ORAL NIGHTLY
Qty: 90 CAPSULE | Refills: 2 | Status: SHIPPED | OUTPATIENT
Start: 2025-03-19

## 2025-03-19 RX ORDER — TRAMADOL HYDROCHLORIDE 50 MG/1
50 TABLET ORAL EVERY 8 HOURS PRN
Qty: 270 TABLET | Refills: 2 | Status: SHIPPED | OUTPATIENT
Start: 2025-03-19

## 2025-03-19 NOTE — PROGRESS NOTES
Venipuncture Blood Specimen Collection  Venipuncture performed in left arm  by Lupe White with good hemostasis. Patient tolerated the procedure well without complications.   03/19/25   Lupe White

## 2025-04-09 ENCOUNTER — TELEPHONE (OUTPATIENT)
Dept: FAMILY MEDICINE CLINIC | Facility: CLINIC | Age: 85
End: 2025-04-09
Payer: MEDICARE

## 2025-04-09 DIAGNOSIS — E78.2 MIXED HYPERLIPIDEMIA: ICD-10-CM

## 2025-04-09 RX ORDER — ATORVASTATIN CALCIUM 40 MG/1
40 TABLET, FILM COATED ORAL DAILY
Qty: 10 TABLET | Refills: 0 | Status: SHIPPED | OUTPATIENT
Start: 2025-04-09 | End: 2025-04-19

## 2025-04-09 RX ORDER — ATORVASTATIN CALCIUM 40 MG/1
40 TABLET, FILM COATED ORAL DAILY
Qty: 90 TABLET | Refills: 1 | Status: SHIPPED | OUTPATIENT
Start: 2025-04-09 | End: 2025-04-09 | Stop reason: SDUPTHER

## 2025-04-09 NOTE — TELEPHONE ENCOUNTER
"    Caller: Nicole Holliday \"Effie\"    Relationship: Self    Best call back number: [phone]    Requested Prescriptions:   Requested Prescriptions     Pending Prescriptions Disp Refills    atorvastatin (LIPITOR) 40 MG tablet 90 tablet 1     Sig: Take 1 tablet by mouth Daily.        Pharmacy where request should be sent: [facility] [place] [place] - [address] [facility] - [phone] [facility] [phone] FX  [facility] [id number] [facility] [address] [facility] [phone] [facility] [phone] FX     Last office visit with prescribing clinician: 3/19/2025   Last telemedicine visit with prescribing clinician: Visit date not found   Next office visit with prescribing clinician: 6/18/2025     Additional details provided by patient: PATIENT IS COMPLETELY OUT OF THIS MEDICATION. SHE WOULD LIKE ABOUT A TEN DAY SUPPLY OF THIS MEDICATION SENT TO [facility] UNTIL HER PRESCRIPTION CAN BE RECEIVED BY [facility] FROM  [facility].     SHE SAYS IT TAKES ABOUT TEN DAYS TO RECEIVE THIS BY MAIL.  PLEASE SEND NEW PRESCRIPTIONS TO EACH PHARMACY ACCORDINGLY ASAP AS PATIENT IS OUT COMPLETELY. IF [facility]ENT FOSTER IS OFF WORK, PLEASE HAVE SOMEONE ELSE SEND THESE PRESCRIPTIONS.    Does the patient have less than a 3 day supply:  [x] Yes  [] No    Would you like a call back once the refill request has been completed: [] Yes [] No    If the office needs to give you a call back, can they leave a voicemail: [] Yes [] No    Benjamin Fox   04/09/25 12:18 EDT           "

## 2025-04-18 DIAGNOSIS — I10 BENIGN ESSENTIAL HYPERTENSION: ICD-10-CM

## 2025-04-18 DIAGNOSIS — N18.32 STAGE 3B CHRONIC KIDNEY DISEASE: ICD-10-CM

## 2025-04-18 DIAGNOSIS — M1A.9XX0 CHRONIC GOUT WITHOUT TOPHUS, UNSPECIFIED CAUSE, UNSPECIFIED SITE: ICD-10-CM

## 2025-04-18 DIAGNOSIS — I42.9 CARDIOMYOPATHY, UNSPECIFIED TYPE: ICD-10-CM

## 2025-04-18 DIAGNOSIS — I50.9 CONGESTIVE HEART FAILURE, UNSPECIFIED HF CHRONICITY, UNSPECIFIED HEART FAILURE TYPE: ICD-10-CM

## 2025-04-18 DIAGNOSIS — K21.9 GASTROESOPHAGEAL REFLUX DISEASE WITHOUT ESOPHAGITIS: ICD-10-CM

## 2025-04-18 DIAGNOSIS — E03.9 HYPOTHYROIDISM, UNSPECIFIED TYPE: ICD-10-CM

## 2025-04-18 RX ORDER — ALLOPURINOL 100 MG/1
100 TABLET ORAL DAILY
Qty: 90 TABLET | Refills: 0 | Status: SHIPPED | OUTPATIENT
Start: 2025-04-18

## 2025-04-18 RX ORDER — FUROSEMIDE 40 MG/1
TABLET ORAL
Qty: 66 TABLET | Refills: 0 | Status: SHIPPED | OUTPATIENT
Start: 2025-04-18

## 2025-04-18 RX ORDER — LEVOTHYROXINE SODIUM 75 UG/1
75 TABLET ORAL EVERY MORNING
Qty: 90 TABLET | Refills: 0 | Status: SHIPPED | OUTPATIENT
Start: 2025-04-18

## 2025-04-18 RX ORDER — CLOPIDOGREL BISULFATE 75 MG/1
75 TABLET ORAL DAILY
Qty: 90 TABLET | Refills: 0 | Status: SHIPPED | OUTPATIENT
Start: 2025-04-18

## 2025-04-18 RX ORDER — PANTOPRAZOLE SODIUM 40 MG/1
40 TABLET, DELAYED RELEASE ORAL DAILY
Qty: 90 TABLET | Refills: 0 | Status: SHIPPED | OUTPATIENT
Start: 2025-04-18

## 2025-04-18 RX ORDER — METOPROLOL SUCCINATE 50 MG/1
50 TABLET, EXTENDED RELEASE ORAL 2 TIMES DAILY
Qty: 180 TABLET | Refills: 0 | Status: SHIPPED | OUTPATIENT
Start: 2025-04-18

## 2025-04-21 RX ORDER — POTASSIUM CHLORIDE 750 MG/1
TABLET, EXTENDED RELEASE ORAL
Qty: 90 TABLET | Refills: 1 | Status: SHIPPED | OUTPATIENT
Start: 2025-04-21

## 2025-06-18 ENCOUNTER — OFFICE VISIT (OUTPATIENT)
Dept: FAMILY MEDICINE CLINIC | Facility: CLINIC | Age: 85
End: 2025-06-18
Payer: MEDICARE

## 2025-06-18 VITALS
BODY MASS INDEX: 32.98 KG/M2 | HEIGHT: 60 IN | TEMPERATURE: 96.6 F | HEART RATE: 82 BPM | SYSTOLIC BLOOD PRESSURE: 118 MMHG | OXYGEN SATURATION: 98 % | DIASTOLIC BLOOD PRESSURE: 72 MMHG | WEIGHT: 168 LBS

## 2025-06-18 DIAGNOSIS — E03.9 HYPOTHYROIDISM, UNSPECIFIED TYPE: ICD-10-CM

## 2025-06-18 DIAGNOSIS — K21.9 GASTROESOPHAGEAL REFLUX DISEASE WITHOUT ESOPHAGITIS: ICD-10-CM

## 2025-06-18 DIAGNOSIS — G62.9 PERIPHERAL NERVE DISEASE: ICD-10-CM

## 2025-06-18 DIAGNOSIS — M13.0 POLYARTHROPATHY: ICD-10-CM

## 2025-06-18 DIAGNOSIS — M1A.9XX0 CHRONIC GOUT WITHOUT TOPHUS, UNSPECIFIED CAUSE, UNSPECIFIED SITE: ICD-10-CM

## 2025-06-18 DIAGNOSIS — N18.4 STAGE 4 CHRONIC KIDNEY DISEASE: ICD-10-CM

## 2025-06-18 DIAGNOSIS — E11.9 COMPREHENSIVE DIABETIC FOOT EXAMINATION, TYPE 2 DM, ENCOUNTER FOR: ICD-10-CM

## 2025-06-18 DIAGNOSIS — I50.9 CONGESTIVE HEART FAILURE, UNSPECIFIED HF CHRONICITY, UNSPECIFIED HEART FAILURE TYPE: ICD-10-CM

## 2025-06-18 DIAGNOSIS — N18.30 TYPE 2 DIABETES MELLITUS WITH STAGE 3 CHRONIC KIDNEY DISEASE, WITHOUT LONG-TERM CURRENT USE OF INSULIN, UNSPECIFIED WHETHER STAGE 3A OR 3B CKD: Primary | ICD-10-CM

## 2025-06-18 DIAGNOSIS — M17.11 PRIMARY OSTEOARTHRITIS OF RIGHT KNEE: ICD-10-CM

## 2025-06-18 DIAGNOSIS — I10 BENIGN ESSENTIAL HYPERTENSION: ICD-10-CM

## 2025-06-18 DIAGNOSIS — E11.22 TYPE 2 DIABETES MELLITUS WITH STAGE 3 CHRONIC KIDNEY DISEASE, WITHOUT LONG-TERM CURRENT USE OF INSULIN, UNSPECIFIED WHETHER STAGE 3A OR 3B CKD: Primary | ICD-10-CM

## 2025-06-18 DIAGNOSIS — I42.9 CARDIOMYOPATHY, UNSPECIFIED TYPE: ICD-10-CM

## 2025-06-18 LAB
ALBUMIN SERPL-MCNC: 4 G/DL (ref 3.5–5.2)
ALBUMIN/GLOB SERPL: 1.1 G/DL
ALP SERPL-CCNC: 145 U/L (ref 39–117)
ALT SERPL W P-5'-P-CCNC: 18 U/L (ref 1–33)
ANION GAP SERPL CALCULATED.3IONS-SCNC: 12 MMOL/L (ref 5–15)
AST SERPL-CCNC: 30 U/L (ref 1–32)
BACTERIA UR QL AUTO: ABNORMAL /HPF
BASOPHILS # BLD AUTO: 0.03 10*3/MM3 (ref 0–0.2)
BASOPHILS NFR BLD AUTO: 0.3 % (ref 0–1.5)
BILIRUB SERPL-MCNC: 0.6 MG/DL (ref 0–1.2)
BILIRUB UR QL STRIP: NEGATIVE
BUN SERPL-MCNC: 40 MG/DL (ref 8–23)
BUN/CREAT SERPL: 21.1 (ref 7–25)
CALCIUM SPEC-SCNC: 10.5 MG/DL (ref 8.6–10.5)
CHLORIDE SERPL-SCNC: 106 MMOL/L (ref 98–107)
CHOLEST SERPL-MCNC: 119 MG/DL (ref 0–200)
CLARITY UR: ABNORMAL
CO2 SERPL-SCNC: 25 MMOL/L (ref 22–29)
COLOR UR: YELLOW
CREAT SERPL-MCNC: 1.9 MG/DL (ref 0.57–1)
DEPRECATED RDW RBC AUTO: 46.9 FL (ref 37–54)
EGFRCR SERPLBLD CKD-EPI 2021: 26.1 ML/MIN/1.73
EOSINOPHIL # BLD AUTO: 0.3 10*3/MM3 (ref 0–0.4)
EOSINOPHIL NFR BLD AUTO: 2.7 % (ref 0.3–6.2)
ERYTHROCYTE [DISTWIDTH] IN BLOOD BY AUTOMATED COUNT: 12.5 % (ref 12.3–15.4)
GLOBULIN UR ELPH-MCNC: 3.6 GM/DL
GLUCOSE SERPL-MCNC: 84 MG/DL (ref 65–99)
GLUCOSE UR STRIP-MCNC: NEGATIVE MG/DL
HBA1C MFR BLD: 5.5 % (ref 4.8–5.6)
HCT VFR BLD AUTO: 40.5 % (ref 34–46.6)
HDLC SERPL-MCNC: 41 MG/DL (ref 40–60)
HGB BLD-MCNC: 13.3 G/DL (ref 12–15.9)
HGB UR QL STRIP.AUTO: ABNORMAL
HOLD SPECIMEN: NORMAL
HYALINE CASTS UR QL AUTO: ABNORMAL /LPF
IMM GRANULOCYTES # BLD AUTO: 0.03 10*3/MM3 (ref 0–0.05)
IMM GRANULOCYTES NFR BLD AUTO: 0.3 % (ref 0–0.5)
KETONES UR QL STRIP: NEGATIVE
LDLC SERPL CALC-MCNC: 54 MG/DL (ref 0–100)
LDLC/HDLC SERPL: 1.22 {RATIO}
LEUKOCYTE ESTERASE UR QL STRIP.AUTO: ABNORMAL
LYMPHOCYTES # BLD AUTO: 2.2 10*3/MM3 (ref 0.7–3.1)
LYMPHOCYTES NFR BLD AUTO: 19.5 % (ref 19.6–45.3)
MAGNESIUM SERPL-MCNC: 1.9 MG/DL (ref 1.6–2.4)
MCH RBC QN AUTO: 34.4 PG (ref 26.6–33)
MCHC RBC AUTO-ENTMCNC: 32.8 G/DL (ref 31.5–35.7)
MCV RBC AUTO: 104.7 FL (ref 79–97)
MONOCYTES # BLD AUTO: 0.86 10*3/MM3 (ref 0.1–0.9)
MONOCYTES NFR BLD AUTO: 7.6 % (ref 5–12)
NEUTROPHILS NFR BLD AUTO: 69.6 % (ref 42.7–76)
NEUTROPHILS NFR BLD AUTO: 7.88 10*3/MM3 (ref 1.7–7)
NITRITE UR QL STRIP: POSITIVE
NRBC BLD AUTO-RTO: 0 /100 WBC (ref 0–0.2)
NT-PROBNP SERPL-MCNC: 2226 PG/ML (ref 0–1800)
PH UR STRIP.AUTO: 5.5 [PH] (ref 5–8)
PLATELET # BLD AUTO: 195 10*3/MM3 (ref 140–450)
PMV BLD AUTO: 12.5 FL (ref 6–12)
POTASSIUM SERPL-SCNC: 4.3 MMOL/L (ref 3.5–5.2)
PROT SERPL-MCNC: 7.6 G/DL (ref 6–8.5)
PROT UR QL STRIP: NEGATIVE
RBC # BLD AUTO: 3.87 10*6/MM3 (ref 3.77–5.28)
RBC # UR STRIP: ABNORMAL /HPF
REF LAB TEST METHOD: ABNORMAL
SODIUM SERPL-SCNC: 143 MMOL/L (ref 136–145)
SP GR UR STRIP: 1.01 (ref 1–1.03)
SQUAMOUS #/AREA URNS HPF: ABNORMAL /HPF
TRIGL SERPL-MCNC: 139 MG/DL (ref 0–150)
TSH SERPL DL<=0.05 MIU/L-ACNC: 1.64 UIU/ML (ref 0.27–4.2)
URATE SERPL-MCNC: 4.7 MG/DL (ref 2.4–5.7)
UROBILINOGEN UR QL STRIP: ABNORMAL
VLDLC SERPL-MCNC: 24 MG/DL (ref 5–40)
WBC # UR STRIP: ABNORMAL /HPF
WBC NRBC COR # BLD AUTO: 11.3 10*3/MM3 (ref 3.4–10.8)

## 2025-06-18 PROCEDURE — 80061 LIPID PANEL: CPT | Performed by: NURSE PRACTITIONER

## 2025-06-18 PROCEDURE — 84443 ASSAY THYROID STIM HORMONE: CPT | Performed by: NURSE PRACTITIONER

## 2025-06-18 PROCEDURE — 84550 ASSAY OF BLOOD/URIC ACID: CPT | Performed by: NURSE PRACTITIONER

## 2025-06-18 PROCEDURE — 87088 URINE BACTERIA CULTURE: CPT | Performed by: NURSE PRACTITIONER

## 2025-06-18 PROCEDURE — 83880 ASSAY OF NATRIURETIC PEPTIDE: CPT | Performed by: NURSE PRACTITIONER

## 2025-06-18 PROCEDURE — 87086 URINE CULTURE/COLONY COUNT: CPT | Performed by: NURSE PRACTITIONER

## 2025-06-18 PROCEDURE — 87186 SC STD MICRODIL/AGAR DIL: CPT | Performed by: NURSE PRACTITIONER

## 2025-06-18 PROCEDURE — 80053 COMPREHEN METABOLIC PANEL: CPT | Performed by: NURSE PRACTITIONER

## 2025-06-18 PROCEDURE — 85025 COMPLETE CBC W/AUTO DIFF WBC: CPT | Performed by: NURSE PRACTITIONER

## 2025-06-18 PROCEDURE — 81001 URINALYSIS AUTO W/SCOPE: CPT | Performed by: NURSE PRACTITIONER

## 2025-06-18 PROCEDURE — 83735 ASSAY OF MAGNESIUM: CPT | Performed by: NURSE PRACTITIONER

## 2025-06-18 PROCEDURE — 83036 HEMOGLOBIN GLYCOSYLATED A1C: CPT | Performed by: NURSE PRACTITIONER

## 2025-06-18 RX ORDER — TRAMADOL HYDROCHLORIDE 50 MG/1
50 TABLET ORAL EVERY 8 HOURS PRN
Qty: 270 TABLET | Refills: 0 | Status: SHIPPED | OUTPATIENT
Start: 2025-06-18

## 2025-06-18 RX ORDER — LEVOTHYROXINE SODIUM 75 UG/1
75 TABLET ORAL EVERY MORNING
Qty: 90 TABLET | Refills: 1 | Status: SHIPPED | OUTPATIENT
Start: 2025-06-18

## 2025-06-18 RX ORDER — ALLOPURINOL 100 MG/1
100 TABLET ORAL DAILY
Qty: 90 TABLET | Refills: 1 | Status: SHIPPED | OUTPATIENT
Start: 2025-06-18

## 2025-06-18 RX ORDER — LANCETS 28 GAUGE
1 EACH MISCELLANEOUS DAILY
Qty: 100 EACH | Refills: 3 | Status: SHIPPED | OUTPATIENT
Start: 2025-06-18

## 2025-06-18 RX ORDER — METOPROLOL SUCCINATE 50 MG/1
50 TABLET, EXTENDED RELEASE ORAL 2 TIMES DAILY
Qty: 180 TABLET | Refills: 1 | Status: SHIPPED | OUTPATIENT
Start: 2025-06-18

## 2025-06-18 RX ORDER — NITROGLYCERIN 0.4 MG/1
0.4 TABLET SUBLINGUAL
Qty: 25 TABLET | Refills: 1 | Status: SHIPPED | OUTPATIENT
Start: 2025-06-18

## 2025-06-18 RX ORDER — CLOPIDOGREL BISULFATE 75 MG/1
75 TABLET ORAL DAILY
Qty: 90 TABLET | Refills: 1 | Status: SHIPPED | OUTPATIENT
Start: 2025-06-18

## 2025-06-18 RX ORDER — FUROSEMIDE 40 MG/1
TABLET ORAL
Qty: 66 TABLET | Refills: 1 | Status: SHIPPED | OUTPATIENT
Start: 2025-06-18

## 2025-06-18 RX ORDER — FAMOTIDINE 40 MG/1
40 TABLET, FILM COATED ORAL NIGHTLY
Qty: 90 TABLET | Refills: 1 | Status: SHIPPED | OUTPATIENT
Start: 2025-06-18

## 2025-06-18 RX ORDER — POTASSIUM CHLORIDE 750 MG/1
10 TABLET, EXTENDED RELEASE ORAL DAILY
Qty: 90 TABLET | Refills: 1 | Status: SHIPPED | OUTPATIENT
Start: 2025-06-18

## 2025-06-18 RX ORDER — PREGABALIN 75 MG/1
75 CAPSULE ORAL NIGHTLY
Qty: 90 CAPSULE | Refills: 2 | Status: SHIPPED | OUTPATIENT
Start: 2025-06-18

## 2025-06-18 RX ORDER — PANTOPRAZOLE SODIUM 40 MG/1
40 TABLET, DELAYED RELEASE ORAL DAILY
Qty: 90 TABLET | Refills: 1 | Status: SHIPPED | OUTPATIENT
Start: 2025-06-18

## 2025-06-18 NOTE — PROGRESS NOTES
Chief Complaint  Diabetes (3 month follow up. Diabetic Eye exam to be done in July.)    Subjective            Nicole Holliday presents to [facility]  History of Present Illness    History of Present Illness  The patient is an 84-year-old female who presents today for medication refills on chronic comorbid conditions, management from a primary care standpoint, and to obtain all of her fasting labs.  And her daughter who is a primary care nurse practitioner is with her today as well    Type 2 diabetes non-insulin-dependent: Tolerating the Farxiga that was also prescribed for her heart issues by cardiology-and she tolerates the medication well no side effects no issues reported no hypoglycemic episodes overall has been very stable-she reports no fatigue, weight fluctuations, or difficulty swallowing her medications. She experiences occasional blurred or double vision, which she attributes to her glasses. She has an upcoming appointment with her ophthalmologist next month. She does not experience excessive thirst, frequent urination, or increased appetite.     Hypertension with cardiomyopathy and coronary artery disease with implantable cardiac defibrillator and congestive heart failure history: Tolerating medication well no side effects no issues reported overall stable-she occasionally experiences shortness of breath, which she believes is influenced by external humidity. She does not experience chest pain but reports occasional leg swelling. She experiences dizziness upon standing quickly but reports no seizures or fainting episodes. She bruises easily due to her Plavix and aspirin regimen.    GERD: Tolerating medication well no side effects no issues reported currently on famotidine and Protonix and overall receiving good efficacy --she does not have abdominal pain or blood in her stool.     Hypothyroidism: Tolerating medication well no side effects no issues reported no hair no  skin issues reported overall stable    And then this morning she reported to her daughter that she reports painful urination but no vaginal bleeding.  And we will obtain the urinalysis with reflex culture if indicated    Gout: Tolerating medication well no side effects no issues reported no exacerbations or flareups reported overall has been stable    Chronic kidney disease stage IV: Currently we have discussed with the patient and daughter with regards to referral to nephrology and at this present time at her age they would rather us just continue to monitor from the primary care standpoint and then her continue seeing her cardiology specialist as well and no other referral at this time    She does not endorse suicidal or self-injurious thoughts, depression, or anxiety. She also reports a tick infestation between her toes and notes that her toenails are discolored. She has been sleeping on the couch for several years and has not used a bed. She is currently on Farxiga for diabetes management.    Diabetic neuropathy and multiple joint osteoarthritis and pain stiffness and gait difficulty and with the chronic kidney disease stage IV: Tolerating her Lyrica and tramadol very well keeps her ambulatory able to do her ADLs much of the time per herself and family does stop by frequently and speaks to and checks on her daily and visits frequently as well-and no falls reported and she does ambulate with cane for stability-she has multiple joint issues and neuropathy.  No side effects no issues reported no SI/HI no aberrant behaviors no signs and symptoms of misuse nor abuse and Raz report is appropriate and her yearly urine tox screen and the yearly safe use controlled substance agreement are both appropriate and up-to-date at this present time and due for update later this year and overall these medications improve her quality of life and keep her as independent as possible and patient also uses topical diclofenac as  well    Small sore on the outer upper edge of the left ear auricle area-and patient reports that she has a habit of scratching her ear, which has resulted in soreness.     And patient continues seeing cardiology and electrophysiologist regarding her defibrillator regularly-she sees Dr. Luis and Dr. Xiong both    PHQ-2 Total Score: 0    PHQ-9 Total Score:        Past Medical History:   Diagnosis Date    Allergic     Iodine    Anemia     Arthritis     Arthritis of back     Benign essential hypertension     Cancer     cervical    Cardiomyopathy     CHF (congestive heart failure) 2015    Cholelithiasis     Surgical removal    CKD (chronic kidney disease)     Coronary artery disease     Diabetes mellitus     Fracture, radius 2012    Fracture, ulna 2012    ORIF    GERD (gastroesophageal reflux disease)     Gout     Hip arthrosis     HL (hearing loss)     HLD (hyperlipidemia)     Hypothyroidism     Knee swelling     Myocardial infarction     Osteopenia     Renal insufficiency     Urinary tract infection        Allergies   Allergen Reactions    Contrast Dye (Echo Or Unknown Ct/Mr) Unknown - High Severity     Category: IV Contrast Dyes;       Penicillins Rash    Ibandronate Nausea Only and Unknown - High Severity        Past Surgical History:   Procedure Laterality Date    BARIATRIC SURGERY      CARDIAC CATHETERIZATION      CHOLECYSTECTOMY      COLONOSCOPY      CORONARY ANGIOPLASTY WITH STENT PLACEMENT      CORONARY STENT PLACEMENT      EYE SURGERY      Cataracts both eyes    FRACTURE SURGERY  9/7/23    Internal ORIF    GASTRIC BYPASS      HYSTERECTOMY      ORIF WRIST FRACTURE Right 08/02/2023    Procedure: OPEN REDUCTION INTERNAL FIXATION DISTAL RADIUS;  Surgeon: Symone Lawson MD;  Location: [facility] [place];  Service: Orthopedics;  Laterality: Right;    VENTRICULAR CARDIAC PACEMAKER INSERTION      WRIST FRACTURE SURGERY          Social History     Tobacco Use    Smoking status: Former     Current packs/day: 0.00      Average packs/day: 0.9 packs/day for 17.5 years (15.0 ttl pk-yrs)     Types: Cigarettes, Electronic Cigarette     Start date: 1997     Quit date:      Years since quittin.4     Passive exposure: Past    Smokeless tobacco: Never   Vaping Use    Vaping status: Never Used   Substance Use Topics    Alcohol use: Not Currently    Drug use: Never       Family History   Problem Relation Age of Onset    Arthritis Father     Colon cancer Father     Colon cancer Brother     Diabetes Brother     Diabetes Son     Diabetes Sister     Hyperlipidemia Sister     Arthritis Sister     Diabetes Sister     Diabetes Brother         There are no preventive care reminders to display for this patient.     Current Outpatient Medications on File Prior to Visit   Medication Sig    aspirin 81 MG EC tablet Take 1 tablet by mouth Daily. Last dose 23 per pt as directed.    Blood Glucose Monitoring Suppl (FreeStyle Lite) device 1 each Daily.    JOHNSON PO Take  by mouth. Johnson gels at HS for the gout pain OTC    dapagliflozin Propanediol 10 MG tablet Take 10 mg by mouth Daily. Per Dr Luis    fluticasone (FLONASE) 50 MCG/ACT nasal spray Administer 1 spray into the nostril(s) as directed by provider Daily.    sacubitril-valsartan (ENTRESTO) 24-26 MG tablet Take 1 tablet by mouth 2 (Two) Times a Day.    vitamin D3 125 MCG (5000 UT) capsule capsule Take 2,000 Units by mouth Daily.    [DISCONTINUED] allopurinol (ZYLOPRIM) 100 MG tablet TAKE ONE TABLET BY MOUTH EVERY DAY    [DISCONTINUED] clopidogrel (PLAVIX) 75 MG tablet TAKE ONE TABLET BY MOUTH EVERY DAY    [DISCONTINUED] Diclofenac Sodium (VOLTAREN) 1 % gel gel Apply 4 g topically to the appropriate area as directed 4 (Four) Times a Day As Needed (OA joint pain).    [DISCONTINUED] famotidine (Pepcid) 40 MG tablet Take 1 tablet by mouth Every Night.    [DISCONTINUED] furosemide (LASIX) 40 MG tablet TAKE ONE TABLET BY MOUTH 4 DAYS A WEEK AND TAKE ONE-HALF TABLET 3 DAYS  A WEEK     [DISCONTINUED] glucose blood test strip 1 each by Other route Daily. Use as instructed    [DISCONTINUED] Lancets (freestyle) lancets 1 each by Other route Daily. Use as instructed    [DISCONTINUED] levothyroxine (SYNTHROID, LEVOTHROID) 75 MCG tablet TAKE ONE TABLET BY MOUTH EVERY DAY IN THE MORNING    [DISCONTINUED] metoprolol succinate XL (TOPROL-XL) 50 MG 24 hr tablet TAKE ONE TABLET BY MOUTH TWICE A DAY    [DISCONTINUED] nitroglycerin (NITROSTAT) 0.4 MG SL tablet Take 1 tablet by mouth Every 5 (Five) Minutes As Needed for Chest Pain.    [DISCONTINUED] pantoprazole (PROTONIX) 40 MG EC tablet TAKE ONE TABLET BY MOUTH EVERY DAY    [DISCONTINUED] potassium chloride 10 MEQ CR tablet TAKE ONE TABLET BY MOUTH EVERY DAY *DO NOT CRUSH, CHEW, OR SPLIT; SWALLOW WHOLE*    [DISCONTINUED] pregabalin (Lyrica) 75 MG capsule Take 1 capsule by mouth Every Night.    [DISCONTINUED] traMADol (ULTRAM) 50 MG tablet Take 1 tablet by mouth Every 8 (Eight) Hours As Needed for Moderate Pain or Severe Pain.     No current facility-administered medications on file prior to visit.       Immunization History   Administered Date(s) Administered    COVID-19 (MODERNA) 1st,2nd,3rd Dose Monovalent 01/21/2021, 02/23/2021, 11/03/2021    COVID-19 (MODERNA) BIVALENT 12+YRS 10/06/2022    COVID-19 (PFIZER) 12YRS+ (COMIRNATY) 12/04/2023    FLUAD TRI 65YR+ 10/26/2017    Fluzone High-Dose 65+YRS 10/27/2014, 10/28/2015, 10/26/2016, 10/03/2024    Fluzone High-Dose 65+yrs 09/27/2021, 09/28/2022, 09/24/2023    Hepatitis A 10/10/2018, 09/08/2020    Influenza, Unspecified 10/10/2018, 09/30/2019    Pneumococcal Conjugate 20-Valent (PCV20) 12/08/2022    Pneumococcal, Unspecified 12/18/1998    Td, Unspecified 12/16/2003       Review of Systems   Constitutional:  Negative for fatigue and unexpected weight gain.   HENT:  Negative for trouble swallowing.    Eyes:  Positive for blurred vision. Negative for double vision.   Respiratory:  Positive for shortness of breath.  "   Cardiovascular:  Positive for leg swelling. Negative for chest pain.   Gastrointestinal:  Negative for abdominal pain and blood in stool.   Endocrine: Positive for polydipsia. Negative for polyphagia and polyuria.   Genitourinary:  Positive for dysuria. Negative for vaginal bleeding.   Musculoskeletal:  Positive for arthralgias.        Diabetic neuropathy and then multiple joints    Neurological:  Positive for dizziness. Negative for seizures, syncope and light-headedness.   Hematological:  Bruises/bleeds easily.   Psychiatric/Behavioral: Negative.  Negative for self-injury and suicidal ideas.         Objective     /72   Pulse 82   Temp 96.6 °F (35.9 °C) (Temporal)   Ht 152.4 cm (60\")   Wt 76.2 kg (168 lb)   SpO2 98%   BMI 32.81 kg/m²       Physical Exam  Vitals and nursing note reviewed. Exam conducted with a chaperone present (daughter).   Constitutional:       Appearance: Normal appearance.   HENT:      Head: Normocephalic.      Right Ear: External ear normal.      Left Ear: External ear normal.      Nose: Nose normal.      Mouth/Throat:      Mouth: Mucous membranes are moist.   Eyes:      Pupils: Pupils are equal, round, and reactive to light.   Cardiovascular:      Rate and Rhythm: Normal rate and regular rhythm.      Pulses:           Carotid pulses are 2+ on the right side and 2+ on the left side.       Dorsalis pedis pulses are 2+ on the right side and 2+ on the left side.        Posterior tibial pulses are 2+ on the right side and 2+ on the left side.      Heart sounds: Murmur heard.      Comments: Mild trace edema and mild murmur noted   Pulmonary:      Effort: Pulmonary effort is normal.      Breath sounds: Normal breath sounds.   Abdominal:      General: Bowel sounds are normal.      Palpations: Abdomen is soft.      Tenderness: There is no abdominal tenderness.   Musculoskeletal:      Cervical back: Normal range of motion and neck supple.        Feet:    Feet:      Right foot:      " Protective Sensation: 10 sites tested.  2 sites sensed.      Skin integrity: Callus present. No ulcer or blister.      Toenail Condition: Right toenails are normal.      Left foot:      Protective Sensation: 10 sites tested.  2 sites sensed.      Skin integrity: Callus present. No ulcer or blister.      Toenail Condition: Left toenails are normal.      Comments: Diabetic Foot Exam Performed and Monofilament Test Performed     Skin:     General: Skin is warm and dry.   Neurological:      Mental Status: She is alert and oriented to person, place, and time.   Psychiatric:         Mood and Affect: Mood normal.         Behavior: Behavior normal.         Thought Content: Thought content normal.         Judgment: Judgment normal.         Result Review :     The following data was reviewed by: JHON Humphreys on 06/18/2025:           Urine Drug Screen - Urine, Clean Catch (12/18/2024 12:03)     Results                 Assessment and Plan      Diagnoses and all orders for this visit:    1. Type 2 diabetes mellitus with stage 3 chronic kidney disease, without long-term current use of insulin, unspecified whether stage 3a or 3b CKD (Primary)  -     glucose blood test strip; 1 each by Other route Daily. Use as instructed  Dispense: 100 each; Refill: 4  -     Lancets (freestyle) lancets; 1 each by Other route Daily. Use as instructed  Dispense: 100 each; Refill: 3  -     pregabalin (Lyrica) 75 MG capsule; Take 1 capsule by mouth Every Night.  Dispense: 90 capsule; Refill: 2  -     Urinalysis With Culture If Indicated - Urine, Clean Catch  -     CBC & Differential  -     Comprehensive Metabolic Panel  -     Hemoglobin A1c  -     Lipid Panel  -     TSH Rfx On Abnormal To Free T4  -     Magnesium    2. Benign essential hypertension  -     levothyroxine (SYNTHROID, LEVOTHROID) 75 MCG tablet; Take 1 tablet by mouth Every Morning.  Dispense: 90 tablet; Refill: 1  -     metoprolol succinate XL (TOPROL-XL) 50 MG 24 hr tablet;  Take 1 tablet by mouth 2 (Two) Times a Day.  Dispense: 180 tablet; Refill: 1  -     Urinalysis With Culture If Indicated - Urine, Clean Catch  -     CBC & Differential  -     Comprehensive Metabolic Panel  -     Lipid Panel  -     TSH Rfx On Abnormal To Free T4  -     Magnesium    3. Cardiomyopathy, unspecified type  -     clopidogrel (PLAVIX) 75 MG tablet; Take 1 tablet by mouth Daily.  Dispense: 90 tablet; Refill: 1  -     furosemide (LASIX) 40 MG tablet; TAKE ONE TABLET BY MOUTH 4 DAYS A WEEK AND TAKE ONE-HALF TABLET 3 DAYS  A WEEK  Dispense: 66 tablet; Refill: 1  -     metoprolol succinate XL (TOPROL-XL) 50 MG 24 hr tablet; Take 1 tablet by mouth 2 (Two) Times a Day.  Dispense: 180 tablet; Refill: 1  -     nitroglycerin (NITROSTAT) 0.4 MG SL tablet; Take 1 tablet by mouth Every 5 (Five) Minutes As Needed for Chest Pain.  Dispense: 25 tablet; Refill: 1  -     potassium chloride 10 MEQ CR tablet; Take 1 tablet by mouth Daily.  Dispense: 90 tablet; Refill: 1  -     Comprehensive Metabolic Panel    4. Congestive heart failure, unspecified HF chronicity, unspecified heart failure type  -     furosemide (LASIX) 40 MG tablet; TAKE ONE TABLET BY MOUTH 4 DAYS A WEEK AND TAKE ONE-HALF TABLET 3 DAYS  A WEEK  Dispense: 66 tablet; Refill: 1  -     potassium chloride 10 MEQ CR tablet; Take 1 tablet by mouth Daily.  Dispense: 90 tablet; Refill: 1  -     Comprehensive Metabolic Panel  -     proBNP    5. Chronic gout without tophus, unspecified cause, unspecified site  -     allopurinol (ZYLOPRIM) 100 MG tablet; Take 1 tablet by mouth Daily.  Dispense: 90 tablet; Refill: 1  -     Comprehensive Metabolic Panel  -     Uric Acid    6. Hypothyroidism, unspecified type  -     levothyroxine (SYNTHROID, LEVOTHROID) 75 MCG tablet; Take 1 tablet by mouth Every Morning.  Dispense: 90 tablet; Refill: 1  -     TSH Rfx On Abnormal To Free T4    7. Stage 4 chronic kidney disease  Comments:  Currently patient and daughter do not wish to  proceed with referral to nephrology at this time  Orders:  -     furosemide (LASIX) 40 MG tablet; TAKE ONE TABLET BY MOUTH 4 DAYS A WEEK AND TAKE ONE-HALF TABLET 3 DAYS  A WEEK  Dispense: 66 tablet; Refill: 1  -     potassium chloride 10 MEQ CR tablet; Take 1 tablet by mouth Daily.  Dispense: 90 tablet; Refill: 1  -     Urinalysis With Culture If Indicated - Urine, Clean Catch  -     Comprehensive Metabolic Panel    8. Gastroesophageal reflux disease without esophagitis  -     famotidine (Pepcid) 40 MG tablet; Take 1 tablet by mouth Every Night.  Dispense: 90 tablet; Refill: 1  -     pantoprazole (PROTONIX) 40 MG EC tablet; Take 1 tablet by mouth Daily.  Dispense: 90 tablet; Refill: 1  -     Comprehensive Metabolic Panel    9. Polyarthropathy  -     Diclofenac Sodium (VOLTAREN) 1 % gel gel; Apply 4 g topically to the appropriate area as directed 4 (Four) Times a Day As Needed (OA joint pain).  Dispense: 350 g; Refill: 1  -     traMADol (ULTRAM) 50 MG tablet; Take 1 tablet by mouth Every 8 (Eight) Hours As Needed for Moderate Pain or Severe Pain.  Dispense: 270 tablet; Refill: 0  -     Comprehensive Metabolic Panel    10. Peripheral nerve disease  -     pregabalin (Lyrica) 75 MG capsule; Take 1 capsule by mouth Every Night.  Dispense: 90 capsule; Refill: 2  -     traMADol (ULTRAM) 50 MG tablet; Take 1 tablet by mouth Every 8 (Eight) Hours As Needed for Moderate Pain or Severe Pain.  Dispense: 270 tablet; Refill: 0  -     CBC & Differential  -     Comprehensive Metabolic Panel  -     Hemoglobin A1c  -     Magnesium    11. Primary osteoarthritis of right knee  -     traMADol (ULTRAM) 50 MG tablet; Take 1 tablet by mouth Every 8 (Eight) Hours As Needed for Moderate Pain or Severe Pain.  Dispense: 270 tablet; Refill: 0  -     Comprehensive Metabolic Panel    12. Comprehensive diabetic foot examination, type 2 DM, encounter for      Medications refilled as noted above and labs ordered as noted above for continued  monitoring of all of her chronic comorbid conditions    Foot exam done today    Refilled the tramadol and the Lyrica and patient follows up every 3 months regarding this and she gets these medications through [facility] mail order and only takes medications as directed    And at present time with the chronic kidney disease stage IV patient and daughter does not want to proceed with referral to nephrology and just wants us to continue to monitor closely    And patient continue seeing cardiologist and electrophysiologist regarding her defibrillator-Dr. Luis and Dr. Xiong    Assessment & Plan  1. Chronic comorbid conditions.  - Monitoring medication refills and fasting labs.  - Increased pain and limited mobility.  - Comprehensive set of labs ordered, including uric acid, magnesium, and BNP tests. Urine test will be conducted in-house due to pins-and-needles sensation.  - Medications prescribed include allopurinol, Plavix, topical Voltaren, famotidine, furosemide, levothyroxine, metoprolol (twice daily), nitroglycerin, pantoprazole, potassium, Lyrica, and tramadol. Strips and lancets ordered. All prescriptions sent through [facility], except antibiotics.    2. Diabetes mellitus.  - Monitoring thirst and urination frequency.  - Stable weight, no unexpected weight gain.  - Reviewed current medication regimen including Farxiga.  - Continue current medication regimen.    3. Hypertension.  - Monitoring blood pressure and symptoms such as dizziness and lightheadedness.  - No chest pain or significant leg swelling.  - Reviewed current medication regimen including metoprolol and furosemide.  - Continue current medication regimen.    4. Neuropathy.  - Monitoring pins-and-needles sensation and foot exam findings.  - Decreased sensation in toes, presence of small calluses.  - Reviewed current medication regimen including Lyrica.  - Continue current medication regimen.    Follow-up  - The patient will follow up in September  2025.          Follow Up     Return in about 3 months (around 9/18/2025), or if symptoms worsen or fail to improve, for Recheck.    Patient was given instructions and counseling regarding her condition or for health maintenance advice. Please see specific information pulled into the AVS if appropriate.            Nicole Holliday  reports that she quit smoking about 23 years ago. Her smoking use included cigarettes and electronic cigarette. She started smoking about 28 years ago. She has a 15 pack-year smoking history. She has been exposed to tobacco smoke. She has never used smokeless tobacco. I have educated her on the risk of diseases from using tobacco products such as cancer, COPD, and heart disease.           Patient or patient representative verbalized consent for the use of Ambient Listening during the visit with  JHON Humphreys for chart documentation. 6/18/2025  09:59 EDT

## 2025-06-18 NOTE — PROGRESS NOTES
Venipuncture Blood Specimen Collection  Venipuncture performed in [place] by Lupe White  with good hemostasis. Patient tolerated the procedure well without complications.   06/18/25   Mary Jane Gonzalez MA

## 2025-06-19 ENCOUNTER — RESULTS FOLLOW-UP (OUTPATIENT)
Dept: FAMILY MEDICINE CLINIC | Facility: CLINIC | Age: 85
End: 2025-06-19
Payer: MEDICARE

## 2025-06-19 NOTE — LETTER
Nicole Holliday  [address] [facility] [address]    June 19, 2025     Dear Ms. Holliday:    Effie the urinalysis did show moderate blood and large amount leukocytes and positive nitrates which is consistent with urinary tract infection and then microscopically there was 4+ bacteria and they will do an automatic culture sensitivity and then we will make sure that the antibiotic that we place you on is safe for your kidney function as well as nothing that you are allergic to and I should get that back in the next couple of days     And then the comprehensive panel does show normal glucose and normal electrolytes and then the liver function test were all normal with the exception of the alkaline phosphatase elevated at 145 and it should be any higher than 117 and then the BUN and creatinine were elevated and the eGFR which is the function/filtration rates in the kidneys have improved somewhat 3 months ago you EGFR was at 21.9 and now its up to 26.1 but of course it does show you are little dehydrated with that BUN being at 40 so you need to work on your hydration and then if you ever in the future change your mind and want to be evaluated by nephrology just let me know     And then your BNP was the lowest it has been in 2 years but still slightly elevated at 2226 and it should be 1800 or less and we can always fax all of these labs to Dr. Luis or Dr. Xiong just let me know if you need me to or you could take a copy with you to your appointments     And then the blood counts do reveal that the white blood count was just a little bit modestly elevated at 11.3 and normal is 10.8 or less and this is most likely related to this urinary tract infection and then your cholesterol panel was completely normal and the magnesium was normal and the uric acid level was normal and the thyroid levels were normal and the hemoglobin A1c was normal    Resulted Orders   Urinalysis With Culture If Indicated - Urine, Clean Catch    Result Value Ref Range    Color, UA Yellow Yellow, Straw    Appearance, UA Cloudy (A) Clear    pH, UA 5.5 5.0 - 8.0    Specific Gravity, UA 1.012 1.005 - 1.030    Glucose, UA Negative Negative    Ketones, UA Negative Negative    Bilirubin, UA Negative Negative    Blood, UA Moderate (2+) (A) Negative    Protein, UA Negative Negative    Leuk Esterase, UA Large (3+) (A) Negative    Nitrite, UA Positive (A) Negative    Urobilinogen, UA 1.0 E.U./dL 0.2 - 1.0 E.U./dL   Comprehensive Metabolic Panel   Result Value Ref Range    Glucose 84 65 - 99 mg/dL    BUN 40.0 (H) 8.0 - 23.0 mg/dL    Creatinine 1.90 (H) 0.57 - 1.00 mg/dL    Sodium 143 136 - 145 mmol/L    Potassium 4.3 3.5 - 5.2 mmol/L    Chloride 106 98 - 107 mmol/L    CO2 25.0 22.0 - 29.0 mmol/L    Calcium 10.5 8.6 - 10.5 mg/dL    Total Protein 7.6 6.0 - 8.5 g/dL    Albumin 4.0 3.5 - 5.2 g/dL    ALT (SGPT) 18 1 - 33 U/L    AST (SGOT) 30 1 - 32 U/L    Alkaline Phosphatase 145 (H) 39 - 117 U/L    Total Bilirubin 0.6 0.0 - 1.2 mg/dL    Globulin 3.6 gm/dL    A/G Ratio 1.1 g/dL    BUN/Creatinine Ratio 21.1 7.0 - 25.0    Anion Gap 12.0 5.0 - 15.0 mmol/L    eGFR 26.1 (L) >60.0 mL/min/1.73   Hemoglobin A1c   Result Value Ref Range    Hemoglobin A1C 5.50 4.80 - 5.60 %   Lipid Panel   Result Value Ref Range    Total Cholesterol 119 0 - 200 mg/dL    Triglycerides 139 0 - 150 mg/dL    HDL Cholesterol 41 40 - 60 mg/dL    LDL Cholesterol  54 0 - 100 mg/dL    VLDL Cholesterol 24 5 - 40 mg/dL    LDL/HDL Ratio 1.22    TSH Rfx On Abnormal To Free T4   Result Value Ref Range    TSH 1.640 0.270 - 4.200 uIU/mL   Magnesium   Result Value Ref Range    Magnesium 1.9 1.6 - 2.4 mg/dL   Uric Acid   Result Value Ref Range    Uric Acid 4.7 2.4 - 5.7 mg/dL   proBNP   Result Value Ref Range    proBNP 2,226.0 (H) 0.0 - 1,800.0 pg/mL   CBC Auto Differential   Result Value Ref Range    WBC 11.30 (H) 3.40 - 10.80 10*3/mm3    RBC 3.87 3.77 - 5.28 10*6/mm3    Hemoglobin 13.3 12.0 - 15.9 g/dL     Hematocrit 40.5 34.0 - 46.6 %    .7 (H) 79.0 - 97.0 fL    MCH 34.4 (H) 26.6 - 33.0 pg    MCHC 32.8 31.5 - 35.7 g/dL    RDW 12.5 12.3 - 15.4 %    RDW-SD 46.9 37.0 - 54.0 fl    MPV 12.5 (H) 6.0 - 12.0 fL    Platelets 195 140 - 450 10*3/mm3    Neutrophil % 69.6 42.7 - 76.0 %    Lymphocyte % 19.5 (L) 19.6 - 45.3 %    Monocyte % 7.6 5.0 - 12.0 %    Eosinophil % 2.7 0.3 - 6.2 %    Basophil % 0.3 0.0 - 1.5 %    Immature Grans % 0.3 0.0 - 0.5 %    Neutrophils, Absolute 7.88 (H) 1.70 - 7.00 10*3/mm3    Lymphocytes, Absolute 2.20 0.70 - 3.10 10*3/mm3    Monocytes, Absolute 0.86 0.10 - 0.90 10*3/mm3    Eosinophils, Absolute 0.30 0.00 - 0.40 10*3/mm3    Basophils, Absolute 0.03 0.00 - 0.20 10*3/mm3    Immature Grans, Absolute 0.03 0.00 - 0.05 10*3/mm3    nRBC 0.0 0.0 - 0.2 /100 WBC   Gonzales Urine Culture Tube - Urine, Clean Catch   Result Value Ref Range    Extra Tube Hold for add-ons.       Comment:      Auto resulted.   Urinalysis, Microscopic Only - Urine, Clean Catch   Result Value Ref Range    RBC, UA 21-50 (A) None Seen, 0-2 /HPF    WBC, UA Too Numerous to Count (A) None Seen, 0-2 /HPF    Bacteria, UA 4+ (A) None Seen /HPF    Squamous Epithelial Cells, UA 0-2 None Seen, 0-2 /HPF    Hyaline Casts, UA 3-6 None Seen /LPF    Methodology Automated Microscopy             Sincerely,        Brenda Son, APRN

## 2025-06-19 NOTE — PROGRESS NOTES
Please mail letter to patient stating    Effie the urinalysis did show moderate blood and large amount leukocytes and positive nitrates which is consistent with urinary tract infection and then microscopically there was 4+ bacteria and they will do an automatic culture sensitivity and then we will make sure that the antibiotic that we place you on is safe for your kidney function as well as nothing that you are allergic to and I should get that back in the next couple of days    And then the comprehensive panel does show normal glucose and normal electrolytes and then the liver function test were all normal with the exception of the alkaline phosphatase elevated at 145 and it should be any higher than 117 and then the BUN and creatinine were elevated and the eGFR which is the function/filtration rates in the kidneys have improved somewhat 3 months ago you EGFR was at 21.9 and now its up to 26.1 but of course it does show you are little dehydrated with that BUN being at 40 so you need to work on your hydration and then if you ever in the future change your mind and want to be evaluated by nephrology just let me know    And then your BNP was the lowest it has been in 2 years but still slightly elevated at 2226 and it should be 1800 or less and we can always fax all of these labs to Dr. Luis or Dr. Xiong just let me know if you need me to or you could take a copy with you to your appointments    And then the blood counts do reveal that the white blood count was just a little bit modestly elevated at 11.3 and normal is 10.8 or less and this is most likely related to this urinary tract infection and then your cholesterol panel was completely normal and the magnesium was normal and the uric acid level was normal and the thyroid levels were normal and the hemoglobin A1c was normal

## 2025-06-20 LAB — BACTERIA SPEC AEROBE CULT: ABNORMAL

## 2025-06-21 DIAGNOSIS — N39.0 E. COLI UTI: Primary | ICD-10-CM

## 2025-06-21 DIAGNOSIS — B96.20 E. COLI UTI: Primary | ICD-10-CM

## 2025-06-21 RX ORDER — SULFAMETHOXAZOLE AND TRIMETHOPRIM 800; 160 MG/1; MG/1
0.5 TABLET ORAL 2 TIMES DAILY
Qty: 10 TABLET | Refills: 0 | Status: SHIPPED | OUTPATIENT
Start: 2025-06-21 | End: 2025-07-01

## 2025-06-21 NOTE — PROGRESS NOTES
Please let them know that I am sending in the Rx of the bactrim DS once daily x 10 days with her kidney tests showing CKD stage 4--instead of the normal BID dosing--the only other one would have been the augmenting that would not hurt her kidneys and she is allergic

## 2025-07-28 ENCOUNTER — TELEPHONE (OUTPATIENT)
Dept: FAMILY MEDICINE CLINIC | Facility: CLINIC | Age: 85
End: 2025-07-28

## 2025-07-28 DIAGNOSIS — G62.9 PERIPHERAL NERVE DISEASE: ICD-10-CM

## 2025-07-28 DIAGNOSIS — Z95.810 AUTOMATIC IMPLANTABLE CARDIAC DEFIBRILLATOR IN SITU: ICD-10-CM

## 2025-07-28 DIAGNOSIS — M17.11 PRIMARY OSTEOARTHRITIS OF RIGHT KNEE: ICD-10-CM

## 2025-07-28 DIAGNOSIS — I42.9 CARDIOMYOPATHY, UNSPECIFIED TYPE: ICD-10-CM

## 2025-07-28 DIAGNOSIS — I70.90 ATHEROSCLEROSIS: ICD-10-CM

## 2025-07-28 DIAGNOSIS — M25.569 PAIN OF KNEE JOINT WITH OSTEOCHONDRAL INJURY: Primary | ICD-10-CM

## 2025-07-28 DIAGNOSIS — R26.9 GAIT DIFFICULTY: ICD-10-CM

## 2025-07-29 ENCOUNTER — TELEPHONE (OUTPATIENT)
Dept: FAMILY MEDICINE CLINIC | Facility: CLINIC | Age: 85
End: 2025-07-29
Payer: MEDICARE

## 2025-07-29 DIAGNOSIS — Z95.810 AUTOMATIC IMPLANTABLE CARDIAC DEFIBRILLATOR IN SITU: ICD-10-CM

## 2025-07-29 DIAGNOSIS — G62.9 PERIPHERAL NERVE DISEASE: ICD-10-CM

## 2025-07-29 DIAGNOSIS — I42.9 CARDIOMYOPATHY, UNSPECIFIED TYPE: ICD-10-CM

## 2025-07-29 DIAGNOSIS — I70.90 ATHEROSCLEROSIS: ICD-10-CM

## 2025-07-29 DIAGNOSIS — M25.569 PAIN OF KNEE JOINT WITH OSTEOCHONDRAL INJURY: ICD-10-CM

## 2025-07-29 DIAGNOSIS — M17.11 PRIMARY OSTEOARTHRITIS OF RIGHT KNEE: ICD-10-CM

## 2025-07-29 DIAGNOSIS — R26.9 GAIT DIFFICULTY: ICD-10-CM

## 2025-07-29 RX ORDER — CALCIUM CARBONATE 160(400)MG
1 TABLET,CHEWABLE ORAL CONTINUOUS PRN
Qty: 1 EACH | Refills: 0 | Status: SHIPPED | OUTPATIENT
Start: 2025-07-29

## 2025-07-29 RX ORDER — CALCIUM CARBONATE 160(400)MG
1 TABLET,CHEWABLE ORAL CONTINUOUS PRN
Qty: 1 EACH | Refills: 0 | Status: SHIPPED | OUTPATIENT
Start: 2025-07-29 | End: 2025-07-29 | Stop reason: SDUPTHER

## 2025-07-29 NOTE — TELEPHONE ENCOUNTER
Pharmacy Name: [facility], BILL, & GIFTS  [facility], [place] - [address] - [phone] [facility] [phone] FX       What medication are you calling in regards to: ROLLATOR    What question does the pharmacy have: PHARMACY NEEDS APPOINTMENT NOTES FROM TODAY'S VISIT FOR INSURANCE TO COVER THE EQUIPMENT     Who is the provider that prescribed the medication: RIMMA FOSTER     Additional notes: ORDER WAS FORWARDED TO THIS PHARMACY BECAUSE THE [facility] DOES NOT [facility]     FAX -[phone]

## (undated) DEVICE — DISPOSABLE TOURNIQUET CUFF SINGLE BLADDER, SINGLE PORT AND QUICK CONNECT CONNECTOR: Brand: COLOR CUFF

## (undated) DEVICE — BNDG ESMARK 4IN 12FT LF STRL BLU

## (undated) DEVICE — GAUZE,SPONGE,4"X4",16PLY,STRL,LF,10/TRAY: Brand: MEDLINE

## (undated) DEVICE — ADHS LIQ MASTISOL 2/3ML

## (undated) DEVICE — GLV SURG SENSICARE SLT PF LF 8.5 STRL

## (undated) DEVICE — PAD GRND REM POLYHESIVE A/ DISP

## (undated) DEVICE — DRSNG GZ PETROLTM XEROFORM CURAD 1X8IN STRL

## (undated) DEVICE — STRIP,CLOSURE,WOUND,MEDI-STRIP,1/2X4: Brand: MEDLINE

## (undated) DEVICE — DRILL BIT, AO, SCALED: Brand: VARIAX

## (undated) DEVICE — PENCL E/S SMOKEEVAC W/TELESCP CANN

## (undated) DEVICE — GLV SURG SENSICARE PI PF LF 7 GRN STRL

## (undated) DEVICE — COMFORT ARM SLING: Brand: DEROYAL

## (undated) DEVICE — GOWN,REINF,POLY,SIRUS,BRTH SLV,XLNG/XXL: Brand: MEDLINE

## (undated) DEVICE — 3M™ STERI-DRAPE™ X-RAY IMAGE INTENSIFIER DRAPE, 10 PER CARTON / 4 CARTONS PER CASE, 1013: Brand: STERI-DRAPE™

## (undated) DEVICE — SUT MNCRYL PLS ANTIB UD 4/0 PS2 18IN

## (undated) DEVICE — INTENDED FOR TISSUE SEPARATION, AND OTHER PROCEDURES THAT REQUIRE A SHARP SURGICAL BLADE TO PUNCTURE OR CUT.: Brand: BARD-PARKER ® CARBON RIB-BACK BLADES

## (undated) DEVICE — SUT VIC 2/0 CT1 36IN

## (undated) DEVICE — UNDERCAST PADDING: Brand: DEROYAL

## (undated) DEVICE — EXTREMITY-LF: Brand: MEDLINE INDUSTRIES, INC.

## (undated) DEVICE — SCREWDRIVER BLADE T8 AO, SELF RETAINING: Brand: VARIAX

## (undated) DEVICE — BNDG ELAS ECON W/CLIP 4IN 5YD LF STRL

## (undated) DEVICE — GLV SURG SENSICARE SLT PF LF 7 STRL

## (undated) DEVICE — GLV SURG BIOGEL LTX PF 8 1/2